# Patient Record
Sex: FEMALE | Race: WHITE | NOT HISPANIC OR LATINO | Employment: FULL TIME | URBAN - METROPOLITAN AREA
[De-identification: names, ages, dates, MRNs, and addresses within clinical notes are randomized per-mention and may not be internally consistent; named-entity substitution may affect disease eponyms.]

---

## 2017-01-03 ENCOUNTER — ANTICOAGULATION THERAPY VISIT (OUTPATIENT)
Dept: ANTICOAGULATION | Facility: CLINIC | Age: 47
End: 2017-01-03
Payer: COMMERCIAL

## 2017-01-03 DIAGNOSIS — Z79.01 LONG-TERM (CURRENT) USE OF ANTICOAGULANTS: Primary | ICD-10-CM

## 2017-01-03 DIAGNOSIS — D68.9 COAGULOPATHY (H): ICD-10-CM

## 2017-01-03 LAB — INR POINT OF CARE: 3.3 (ref 0.86–1.14)

## 2017-01-03 PROCEDURE — 99207 ZZC NO CHARGE NURSE ONLY: CPT

## 2017-01-03 PROCEDURE — 85610 PROTHROMBIN TIME: CPT | Mod: QW

## 2017-01-03 PROCEDURE — 36416 COLLJ CAPILLARY BLOOD SPEC: CPT

## 2017-01-03 NOTE — PROGRESS NOTES
ANTICOAGULATION FOLLOW-UP CLINIC VISIT    Patient Name:  Renetta Reid  Date:  1/3/2017  Contact Type:  Face to Face    SUBJECTIVE:     Patient Findings     Positives Unexplained INR or factor level change           OBJECTIVE    INR PROTIME   Date Value Ref Range Status   01/03/2017 3.3* 0.86 - 1.14 Final     CHROMOGENIC FACTOR 10   Date Value Ref Range Status   08/05/2010 60 60 - 140 % Final     Comment:     Therapeutic Range: A Chromogenic Factor 10 level of 20-40% inversely   correlates   with an INR of 2-3 for patients receiving Warfarin. Chromogenic Factor 10   levels below 20% indicate an INR greater than 3, and levels above 40%   indicate   an INR lessthan 2.       ASSESSMENT / PLAN  INR assessment SUPRA    Recheck INR In: 4 WEEKS    INR Location Clinic      Anticoagulation Summary as of 1/3/2017     INR goal 2.0-3.0   Selected INR 3.3! (1/3/2017)   Maintenance plan 1.25 mg (2.5 mg x 0.5) on Wed, Fri; 2.5 mg (2.5 mg x 1) all other days   Full instructions 1.25 mg on Wed, Fri; 2.5 mg all other days   Weekly total 15 mg   No change documented Elaine Lu, RN   Plan last modified Elaine Lu, RN (7/26/2016)   Next INR check 1/31/2017   Priority INR   Target end date     Indications   Long-term (current) use of anticoagulants [Z79.01] [Z79.01]  Coagulopathy (H) [D68.9]         Anticoagulation Episode Summary     INR check location     Preferred lab     Send INR reminders to [facility]    Comments       Anticoagulation Care Providers     Provider Role Specialty Phone number    Hortencia Marcus MD [facility] Internal Medicine [phone]            See the Encounter Report to view Anticoagulation Flowsheet and Dosing Calendar (Go to Encounters tab in chart review, and find the Anticoagulation Therapy Visit)    Dosage adjustment made based on physician directed care plan.    Elaine Lu, RN

## 2017-01-13 DIAGNOSIS — D68.9 COAGULOPATHY (H): ICD-10-CM

## 2017-01-13 DIAGNOSIS — L50.9 URTICARIA: Primary | ICD-10-CM

## 2017-01-13 RX ORDER — WARFARIN SODIUM 2.5 MG/1
TABLET ORAL
Qty: 90 TABLET | Refills: 0 | Status: SHIPPED | OUTPATIENT
Start: 2017-01-13 | End: 2017-04-17

## 2017-01-13 RX ORDER — HYDROXYZINE HYDROCHLORIDE 10 MG/1
10 TABLET, FILM COATED ORAL AT BEDTIME
Qty: 90 TABLET | Refills: 0 | Status: SHIPPED | OUTPATIENT
Start: 2017-01-13 | End: 2017-02-07

## 2017-01-13 NOTE — TELEPHONE ENCOUNTER
Warfarin Pt has upcoming appt, will fill x 1.     Last Written Prescription Date: 7/19/16  Last Fill Qty: 90, # refills: 1    Last Office Visit with G, P or [facility] prescribing provider: 1/4/16     Next 5 appointments (look out 90 days)     Jan 23, 2017  8:20 AM   PHYSICAL with Hortencia Marcus MD   [facility]    [address]  [facility] [address]   [phone]                 Date and Result of Last PT/INR:   INR      3.3   1/3/2017  INR      2.4   11/22/2016  INR      2.4   2/8/2012  INR      2.6   12/15/2010           Lab Results   Component Value Date    INR 3.3* 01/03/2017    INR 2.4* 11/22/2016    INR 2.1* 10/12/2016    INR 2.3* 09/14/2016    INR 2.2* 08/31/2016

## 2017-01-13 NOTE — TELEPHONE ENCOUNTER
Hydroxyzine      Last Written Prescription Date: 12/17/16  Last Fill Quantity: 90,  # refills: 3   Last Office Visit with G, P or [facility] prescribing provider: 01/04/16                                         Next 5 appointments (look out 90 days)     Jan 23, 2017  8:20 AM   PHYSICAL with Hortencia Marcus MD   [facility] ([facility])    [address]  [facility] [id number] [phone]

## 2017-01-31 ENCOUNTER — ANTICOAGULATION THERAPY VISIT (OUTPATIENT)
Dept: ANTICOAGULATION | Facility: CLINIC | Age: 47
End: 2017-01-31
Payer: COMMERCIAL

## 2017-01-31 DIAGNOSIS — Z79.01 LONG-TERM (CURRENT) USE OF ANTICOAGULANTS: Primary | ICD-10-CM

## 2017-01-31 DIAGNOSIS — D68.9 COAGULOPATHY (H): ICD-10-CM

## 2017-01-31 LAB — INR POINT OF CARE: 1.6 (ref 0.86–1.14)

## 2017-01-31 PROCEDURE — 85610 PROTHROMBIN TIME: CPT | Mod: QW

## 2017-01-31 PROCEDURE — 99207 ZZC NO CHARGE NURSE ONLY: CPT

## 2017-01-31 PROCEDURE — 36416 COLLJ CAPILLARY BLOOD SPEC: CPT

## 2017-01-31 NOTE — PROGRESS NOTES
ANTICOAGULATION FOLLOW-UP CLINIC VISIT    Patient Name:  Renetta Reid  Date:  1/31/2017  Contact Type:  Face to Face    SUBJECTIVE:     Patient Findings     Positives Missed doses (denies), Unexplained INR or factor level change           OBJECTIVE    INR PROTIME   Date Value Ref Range Status   01/31/2017 1.6* 0.86 - 1.14 Final     CHROMOGENIC FACTOR 10   Date Value Ref Range Status   08/05/2010 60 60 - 140 % Final     Comment:     Therapeutic Range: A Chromogenic Factor 10 level of 20-40% inversely   correlates   with an INR of 2-3 for patients receiving Warfarin. Chromogenic Factor 10   levels below 20% indicate an INR greater than 3, and levels above 40%   indicate   an INR lessthan 2.       ASSESSMENT / PLAN  INR assessment SUB    Recheck INR In: 2 WEEKS    INR Location Clinic      Anticoagulation Summary as of 1/31/2017     INR goal 2.0-3.0   Selected INR 1.6! (1/31/2017)   Maintenance plan 1.25 mg (2.5 mg x 0.5) on Wed, Fri; 2.5 mg (2.5 mg x 1) all other days   Full instructions 1/31: 5 mg; 2/1: 2.5 mg; Otherwise 1.25 mg on Wed, Fri; 2.5 mg all other days   Weekly total 15 mg   Plan last modified Elaine Lu RN (7/26/2016)   Next INR check 2/14/2017   Priority INR   Target end date     Indications   Long-term (current) use of anticoagulants [Z79.01] [Z79.01]  Coagulopathy (H) [D68.9]         Anticoagulation Episode Summary     INR check location     Preferred lab     Send INR reminders to [facility]    Comments       Anticoagulation Care Providers     Provider Role Specialty Phone number    Hortencia Marcus MD [facility] Internal Medicine [phone]            See the Encounter Report to view Anticoagulation Flowsheet and Dosing Calendar (Go to Encounters tab in chart review, and find the Anticoagulation Therapy Visit)    Dosage adjustment made based on physician directed care plan.    Elaine Lu, RN

## 2017-01-31 NOTE — MR AVS SNAPSHOT
Renetta Reid   1/31/2017 2:45 PM   Anticoagulation Therapy Visit    Description:  46 year old female   Provider:  [facility]   Department:  [facility]           INR as of 1/31/2017     Selected INR 1.6! (1/31/2017)      Anticoagulation Summary as of 1/31/2017     INR goal 2.0-3.0   Selected INR 1.6! (1/31/2017)   Full instructions 1/31: 5 mg; 2/1: 2.5 mg; Otherwise 1.25 mg on Wed, Fri; 2.5 mg all other days   Next INR check 2/14/2017    Indications   Long-term (current) use of anticoagulants [Z79.01] [Z79.01]  Coagulopathy (H) [D68.9]         Your next Anticoagulation Clinic appointment(s)     Feb 14, 2017 11:45 AM   Anticoagulation Visit with [facility]   [facility]    [address] [facility] [address]  [facility] [id number] [phone]              Contact Numbers     [facility] Phone Numbers:  Anticoagulation Clinic Appointments : [phone]  Anticoagulation Nurse: [phone]         January 2017 Details    Sun Mon Tue Wed Thu Fri Sat     1               2               3               4               5               6               7                 8               9               10               11               12               13               14                 15               16               17               18               19               20               21                 22               23               24               25               26               27               28                 29               30               31      5 mg   See details           Date Details   01/31 This INR check               How to take your warfarin dose     To take:  5 mg Take 2 of the 2.5 mg tablets.           February 2017 Details    Sun Mon Tue Wed Thu Fri Sat        1      2.5 mg         2      2.5 mg         3      1.25 mg         4      2.5 mg           5      2.5 mg         6      2.5 mg         7      2.5 mg          8      1.25 mg         9      2.5 mg         10      1.25 mg         11      2.5 mg           12      2.5 mg         13      2.5 mg         14            15               16               17               18                 19               20               21               22               23               24               25                 26               27               28                    Date Details   No additional details    Date of next INR:  2/14/2017         How to take your warfarin dose     To take:  1.25 mg Take 0.5 of a 2.5 mg tablet.    To take:  2.5 mg Take 1 of the 2.5 mg tablets.

## 2017-02-07 ENCOUNTER — RADIANT APPOINTMENT (OUTPATIENT)
Dept: GENERAL RADIOLOGY | Facility: CLINIC | Age: 47
End: 2017-02-07
Attending: INTERNAL MEDICINE
Payer: COMMERCIAL

## 2017-02-07 ENCOUNTER — OFFICE VISIT (OUTPATIENT)
Dept: INTERNAL MEDICINE | Facility: CLINIC | Age: 47
End: 2017-02-07
Payer: COMMERCIAL

## 2017-02-07 VITALS
HEIGHT: 62 IN | BODY MASS INDEX: 45.78 KG/M2 | SYSTOLIC BLOOD PRESSURE: 110 MMHG | WEIGHT: 248.8 LBS | HEART RATE: 118 BPM | TEMPERATURE: 97.8 F | DIASTOLIC BLOOD PRESSURE: 82 MMHG | OXYGEN SATURATION: 99 %

## 2017-02-07 DIAGNOSIS — Z00.00 ENCOUNTER FOR ROUTINE ADULT HEALTH EXAMINATION WITHOUT ABNORMAL FINDINGS: Primary | ICD-10-CM

## 2017-02-07 DIAGNOSIS — L50.9 URTICARIA: ICD-10-CM

## 2017-02-07 DIAGNOSIS — S69.92XA HAND INJURY, LEFT, INITIAL ENCOUNTER: ICD-10-CM

## 2017-02-07 DIAGNOSIS — K21.9 GASTROESOPHAGEAL REFLUX DISEASE WITHOUT ESOPHAGITIS: ICD-10-CM

## 2017-02-07 LAB
ANION GAP SERPL CALCULATED.3IONS-SCNC: 7 MMOL/L (ref 3–14)
BUN SERPL-MCNC: 12 MG/DL (ref 7–30)
CALCIUM SERPL-MCNC: 8.6 MG/DL (ref 8.5–10.1)
CHLORIDE SERPL-SCNC: 106 MMOL/L (ref 94–109)
CHOLEST SERPL-MCNC: 151 MG/DL
CO2 SERPL-SCNC: 25 MMOL/L (ref 20–32)
CREAT SERPL-MCNC: 0.66 MG/DL (ref 0.52–1.04)
GFR SERPL CREATININE-BSD FRML MDRD: NORMAL ML/MIN/1.7M2
GLUCOSE SERPL-MCNC: 90 MG/DL (ref 70–99)
HDLC SERPL-MCNC: 64 MG/DL
LDLC SERPL CALC-MCNC: 70 MG/DL
NONHDLC SERPL-MCNC: 87 MG/DL
POTASSIUM SERPL-SCNC: 4.2 MMOL/L (ref 3.4–5.3)
SODIUM SERPL-SCNC: 138 MMOL/L (ref 133–144)
TRIGL SERPL-MCNC: 84 MG/DL

## 2017-02-07 PROCEDURE — 80048 BASIC METABOLIC PNL TOTAL CA: CPT | Performed by: INTERNAL MEDICINE

## 2017-02-07 PROCEDURE — 80061 LIPID PANEL: CPT | Performed by: INTERNAL MEDICINE

## 2017-02-07 PROCEDURE — 99396 PREV VISIT EST AGE 40-64: CPT | Performed by: INTERNAL MEDICINE

## 2017-02-07 PROCEDURE — 87624 HPV HI-RISK TYP POOLED RSLT: CPT | Performed by: INTERNAL MEDICINE

## 2017-02-07 PROCEDURE — 73130 X-RAY EXAM OF HAND: CPT | Mod: LT

## 2017-02-07 PROCEDURE — 36415 COLL VENOUS BLD VENIPUNCTURE: CPT | Performed by: INTERNAL MEDICINE

## 2017-02-07 PROCEDURE — G0123 SCREEN CERV/VAG THIN LAYER: HCPCS | Performed by: INTERNAL MEDICINE

## 2017-02-07 RX ORDER — HYDROXYZINE HYDROCHLORIDE 10 MG/1
10 TABLET, FILM COATED ORAL AT BEDTIME
Qty: 90 TABLET | Refills: 3 | Status: SHIPPED | OUTPATIENT
Start: 2017-02-07 | End: 2018-04-21

## 2017-02-07 NOTE — MR AVS SNAPSHOT
After Visit Summary   2/7/2017    Renetta Reid    MRN: [id number]           Patient Information     Date Of Birth          1970        Visit Information        Provider Department      2/7/2017 9:00 AM Hortencia Marcus MD [facility]        Today's Diagnoses     Encounter for routine adult health examination without abnormal findings    -  1     Gastroesophageal reflux disease without esophagitis         Urticaria         Hand injury, left, initial encounter           Care Instructions      PREVENTIVE HEALTH RECOMMENDATIONS:     Vaccines: Get a flu shot each year. Get a tetanus shot every 10 years.     Exercise for at least 150 minutes a week (an average of 30 minutes a day, 5 days of the week). This will help you control your weight and prevent disease.    Limit alcohol to one drink per day.    No smoking.     Wear sunscreen to prevent skin cancer.     See your dentist twice a year for an exam and cleaning.    Try to get Calcium 1000 mg total per day. It is best to not take it all at once. Try to get Vitamin D at least 800-1000 units per day.    BMI or Body Mass Index is a way of indicating weight and health risk for cardiovascular diseases, high blood pressure, diabetes.   Definitions:    Underweight is less than 18.5 and will be associated with health risk.   Normal BMI is 18.5 to 25   Overweight is 25-29   Obesity is 30 or greater   Morbid Obesity is 40 or greater or 35 or greater with diabetes or high blood pressure  Obesity and Morbid Obesity are associated with higher health risks. Lowering calories, exercising more may lower your BMI and even small decreases can have positive impact on lowering health risks.   Your Body mass index is 45.49 kg/(m^2)..           Follow-ups after your visit        Your next 10 appointments already scheduled     Feb 14, 2017 11:45 AM   Anticoagulation Visit with [facility]   [facility] ([facility]  "   [address]  [facility] [id number] [phone]              Future tests that were ordered for you today     Open Future Orders        Priority Expected Expires Ordered    XR Hand Left G/E 3 Views Routine 2/7/2017 2/7/2018 2/7/2017            Who to contact     If you have questions or need follow up information about today's clinic visit or your schedule please contact [facility] directly at [phone].  Normal or non-critical lab and imaging results will be communicated to you by Root4hart, letter or phone within 4 business days after the clinic has received the results. If you do not hear from us within 7 days, please contact the clinic through OpinewsTVt or phone. If you have a critical or abnormal lab result, we will notify you by phone as soon as possible.  Submit refill requests through Qriously or call your pharmacy and they will forward the refill request to us. Please allow 3 business days for your refill to be completed.          Additional Information About Your Visit        Qriously Information     Qriously gives you secure access to your electronic health record. If you see a primary care provider, you can also send messages to your care team and make appointments. If you have questions, please call your primary care clinic.  If you do not have a primary care provider, please call [phone] and they will assist you.        Care EveryWhere ID     This is your Care EveryWhere ID. This could be used by other organizations to access your [place] medical records  [id number]        Your Vitals Were     Pulse Temperature Height BMI (Body Mass Index) Pulse Oximetry Last Period    118 97.8  F (36.6  C) (Oral) 5' 2\" (1.575 m) 45.49 kg/m2 99% 01/10/2017       Blood Pressure from Last 3 Encounters:   02/07/17 110/82   01/04/16 110/84   12/17/15 120/90    Weight from Last 3 Encounters:   02/07/17 248 lb 12.8 oz (112.855 kg)   01/04/16 266 lb (120.657 kg)   12/17/15 265 lb " (120.203 kg)              We Performed the Following     Basic metabolic panel     Lipid Profile with reflex to direct LDL          Where to get your medicines      These medications were sent to [place] [address] [place] [facility] [place] [place] - [address] [facility]  [address] [facility] [address]    Hours:  Tech issues with their phone system Phone:  [phone]    - hydrOXYzine 10 MG tablet  - omeprazole 20 MG CR capsule       Primary Care Provider Office Phone # Fax #    Hortencia Marcus [phone]       [facility] [address] [facility] [address]  [facility] [address]        Thank you!     Thank you for choosing [facility]  for your care. Our goal is always to provide you with excellent care. Hearing back from our patients is one way we can continue to improve our services. Please take a few minutes to complete the written survey that you may receive in the mail after your visit with us. Thank you!             Your Updated Medication List - Protect others around you: Learn how to safely use, store and throw away your medicines at www.disposemymeds.org.          This list is accurate as of: 2/7/17  9:29 AM.  Always use your most recent med list.                   Brand Name Dispense Instructions for use    hydrOXYzine 10 MG tablet    ATARAX    90 tablet    Take 1 tablet (10 mg) by mouth At Bedtime       omeprazole 20 MG CR capsule    priLOSEC    90 capsule    Take 1 capsule (20 mg) by mouth daily       warfarin 2.5 MG tablet    COUMADIN    90 tablet    Take 1 tablet (2.5 mg)  daily except take 1/2 tablet (1.25 mg) on Wednesday and Friday, or as instructed based on INR result.

## 2017-02-07 NOTE — PROGRESS NOTES
SUBJECTIVE:     CC: Renetta Reid is an 46 year old woman who presents for preventive health visit.     Healthy Habits:    Do you get at least three servings of calcium containing foods daily (dairy, green leafy vegetables, etc.)? no, taking calcium and/or vitamin D supplement: no    Amount of exercise or daily activities, outside of work: no    Problems taking medications regularly No    Medication side effects: No    Have you had an eye exam in the past two years? yes    Do you see a dentist twice per year? yes    Do you have sleep apnea, excessive snoring or daytime drowsiness?no        Current concerns:   She fell 1.5 weeks ago hitting left knee and left hand. The hand is still very painful at 5th metatarsal at wrist.     Today's PHQ-2 Score:   PHQ-2 ( 1999 [employer]) 2/7/2017 1/4/2016   Q1: Little interest or pleasure in doing things 0 0   Q2: Feeling down, depressed or hopeless 0 0   PHQ-2 Score 0 0       Abuse: Current or Past(Physical, Sexual or Emotional)- No  Do you feel safe in your environment - Yes    Social History   Substance Use Topics     Smoking status: Never Smoker      Smokeless tobacco: Never Used     Alcohol Use: Yes      Comment: socially     The patient does not drink >3 drinks per day nor >7 drinks per week.    Recent Labs   Lab Test  11/07/14   1011  05/29/12   0935   CHOL  141  149   HDL  52  47*   LDL  69  61   TRIG  98  203*   CHOLHDLRATIO  2.7  3.2       Reviewed orders with patient.  Reviewed health maintenance and updated orders accordingly - Yes    Mammo Decision Support:  Mammogram not appropriate for this patient based on age.    Pertinent mammograms are reviewed under the imaging tab.  History of abnormal Pap smear: NO - age 30- 65 PAP every 3 years recommended    Patient Active Problem List   Diagnosis     Calculus of kidney     CARDIOVASCULAR SCREENING; LDL GOAL LESS THAN 160     Coagulopathy (H)     HSP (Henoch Schonlein purpura) (H)     Elevated blood pressure reading without  "diagnosis of hypertension     Gastroesophageal reflux disease without esophagitis     Long-term (current) use of anticoagulants [Z79.01]     Current Outpatient Prescriptions   Medication Sig Dispense Refill     hydrOXYzine (ATARAX) 10 MG tablet Take 1 tablet (10 mg) by mouth At Bedtime 90 tablet 0     warfarin (COUMADIN) 2.5 MG tablet Take 1 tablet (2.5 mg)  daily except take 1/2 tablet (1.25 mg) on Wednesday and Friday, or as instructed based on INR result. 90 tablet 0     omeprazole (PRILOSEC) 20 MG capsule Take 1 capsule (20 mg) by mouth daily 90 capsule 0      Review Of Systems  Skin: negative  Eyes: negative  Ears/Nose/Throat: negative  Respiratory: No dyspnea on exertion and No cough  Cardiovascular: negative  Gastrointestinal: negative  Genitourinary: leakage  Musculoskeletal: as above  Neurologic: negative  Psychiatric: negative  Hematologic/Lymphatic/Immunologic: negative  Endocrine: negative   Gynecologic ros reveals:normal menses, no abnormal bleeding, pelvic pain or discharge, no breast pain or new or enlarging lumps on self exam    Objective:  Patient alert, in no acute distress  /82 mmHg  Pulse 118  Temp(Src) 97.8  F (36.6  C) (Oral)  Ht 5' 2\" (1.575 m)  Wt 248 lb 12.8 oz (112.855 kg)  BMI 45.49 kg/m2  SpO2 99%    HEENT: extraocular movements are intact, pupils equal and reactive to light and accommodation, TMs clear, oropharynx clear  NECK: Neck supple. No adenopathy. Thyroid symmetric, normal size,, Carotids without bruits.  PULMONARY: clear to auscultation  CARDIAC: regular rate and rhythm and no murmurs, clicks, or gallops  PULSES: 2/2 throughout  BACK: no spinal or CVAT  ABDOMINAL: Soft, nontender.  Normal bowel sounds.  No hepatosplenomegaly or abnormal masses  BREAST: No breast masses or tenderness, No axillary masses or tenderness and No galactorrhea  PELVIC: external genitalia normal, vaginal mucosa normal, cervix normal, specimen sent for pap, bimanual exam with normal uterus and " "adnexa, rectovaginal exam unremarkable  REFLEXES: 2+ throughout  SKIN: unremarkable  Left knee: prepatellar bursa and infra patellar busa with effusions. Mildly tender at patella and tibia.          ASSESSMENT/PLAN:     1. Encounter for routine adult health examination without abnormal findings    - Basic metabolic panel  - Lipid Profile with reflex to direct LDL    2. Gastroesophageal reflux disease without esophagitis  stable  - omeprazole (PRILOSEC) 20 MG CR capsule; Take 1 capsule (20 mg) by mouth daily  Dispense: 90 capsule; Refill: 3    3. Urticaria  stable  - hydrOXYzine (ATARAX) 10 MG tablet; Take 1 tablet (10 mg) by mouth At Bedtime  Dispense: 90 tablet; Refill: 3    4. Hand injury, left, initial encounter  May be fracture, check XR  - XR Hand Left G/E 3 Views; Future    COUNSELING:   Reviewed preventive health counseling, as reflected in patient instructions       Osteoporosis Prevention/Bone Health    BP Screening:   Last 3 BP Readings:    BP Readings from Last 3 Encounters:   02/07/17 110/82   01/04/16 110/84   12/17/15 120/90       The following was recommended to the patient:  Re-screen BP within a year and recommended lifestyle modifications       reports that she has never smoked. She has never used smokeless tobacco.    Estimated body mass index is 45.49 kg/(m^2) as calculated from the following:    Height as of this encounter: 5' 2\" (1.575 m).    Weight as of this encounter: 248 lb 12.8 oz (112.855 kg).   Weight management plan: Discussed healthy diet and exercise guidelines and patient will follow up in 12 months in clinic to re-evaluate.    Counseling Resources:  ATP IV Guidelines  Pooled Cohorts Equation Calculator  Breast Cancer Risk Calculator  FRAX Risk Assessment  ICSI Preventive Guidelines  Dietary Guidelines for Americans, 2010  USDA's MyPlate  ASA Prophylaxis  Lung CA Screening    Hortencia Marcus MD  [facility]  "

## 2017-02-07 NOTE — PATIENT INSTRUCTIONS
PREVENTIVE HEALTH RECOMMENDATIONS:     Vaccines: Get a flu shot each year. Get a tetanus shot every 10 years.     Exercise for at least 150 minutes a week (an average of 30 minutes a day, 5 days of the week). This will help you control your weight and prevent disease.    Limit alcohol to one drink per day.    No smoking.     Wear sunscreen to prevent skin cancer.     See your dentist twice a year for an exam and cleaning.    Try to get Calcium 1000 mg total per day. It is best to not take it all at once. Try to get Vitamin D at least 800-1000 units per day.    BMI or Body Mass Index is a way of indicating weight and health risk for cardiovascular diseases, high blood pressure, diabetes.   Definitions:    Underweight is less than 18.5 and will be associated with health risk.   Normal BMI is 18.5 to 25   Overweight is 25-29   Obesity is 30 or greater   Morbid Obesity is 40 or greater or 35 or greater with diabetes or high blood pressure  Obesity and Morbid Obesity are associated with higher health risks. Lowering calories, exercising more may lower your BMI and even small decreases can have positive impact on lowering health risks.   Your Body mass index is 45.49 kg/(m^2)..

## 2017-02-07 NOTE — NURSING NOTE
"Chief Complaint   Patient presents with     Physical     fasting for labs,       Initial /82 mmHg  Pulse 118  Temp(Src) 97.8  F (36.6  C) (Oral)  Ht 5' 2\" (1.575 m)  Wt 248 lb 12.8 oz (112.855 kg)  BMI 45.49 kg/m2  SpO2 99% Estimated body mass index is 45.49 kg/(m^2) as calculated from the following:    Height as of this encounter: 5' 2\" (1.575 m).    Weight as of this encounter: 248 lb 12.8 oz (112.855 kg).  Medication Reconciliation: complete    "

## 2017-02-10 LAB
COPATH REPORT: NORMAL
PAP: NORMAL

## 2017-02-13 LAB
FINAL DIAGNOSIS: NORMAL
HPV HR 12 DNA CVX QL NAA+PROBE: NEGATIVE
HPV16 DNA SPEC QL NAA+PROBE: NEGATIVE
HPV18 DNA SPEC QL NAA+PROBE: NEGATIVE
SPECIMEN DESCRIPTION: NORMAL

## 2017-02-14 ENCOUNTER — ANTICOAGULATION THERAPY VISIT (OUTPATIENT)
Dept: ANTICOAGULATION | Facility: CLINIC | Age: 47
End: 2017-02-14
Payer: COMMERCIAL

## 2017-02-14 DIAGNOSIS — D68.9 COAGULOPATHY (H): ICD-10-CM

## 2017-02-14 DIAGNOSIS — Z79.01 LONG-TERM (CURRENT) USE OF ANTICOAGULANTS: ICD-10-CM

## 2017-02-14 LAB — INR POINT OF CARE: NORMAL (ref 0.86–1.14)

## 2017-02-14 PROCEDURE — 36416 COLLJ CAPILLARY BLOOD SPEC: CPT

## 2017-02-14 PROCEDURE — 99207 ZZC NO CHARGE NURSE ONLY: CPT

## 2017-02-14 PROCEDURE — 85610 PROTHROMBIN TIME: CPT | Mod: QW

## 2017-02-14 NOTE — PROGRESS NOTES
ANTICOAGULATION FOLLOW-UP CLINIC VISIT    Patient Name:  Renetta Reid  Date:  2/14/2017  Contact Type:  Face to Face    SUBJECTIVE:     Patient Findings     Positives No Problem Findings           OBJECTIVE    INR Protime   Date Value Ref Range Status   02/14/2017 2.2. 0.86 - 1.14 Final     Chromogenic Factor 10   Date Value Ref Range Status   08/05/2010 60 60 - 140 % Final     Comment:     Therapeutic Range: A Chromogenic Factor 10 level of 20-40% inversely   correlates   with an INR of 2-3 for patients receiving Warfarin. Chromogenic Factor 10   levels below 20% indicate an INR greater than 3, and levels above 40%   indicate   an INR lessthan 2.       ASSESSMENT / PLAN  INR assessment THER    Recheck INR In: 4 WEEKS    INR Location Clinic      Anticoagulation Summary as of 2/14/2017     INR goal 2.0-3.0   Today's INR 2.2.   The selected INR is not numeric and cannot be flagged as in or out of the INR goal range.   Maintenance plan 1.25 mg (2.5 mg x 0.5) on Wed, Fri; 2.5 mg (2.5 mg x 1) all other days   Full instructions 1.25 mg on Wed, Fri; 2.5 mg all other days   Weekly total 15 mg   No change documented Elaine Lu RN   Plan last modified Elaine Lu RN (7/26/2016)   Next INR check 3/14/2017   Priority INR   Target end date     Indications   Long-term (current) use of anticoagulants [Z79.01] [Z79.01]  Coagulopathy (H) [D68.9]         Anticoagulation Episode Summary     INR check location     Preferred lab     Send INR reminders to [facility]    Comments       Anticoagulation Care Providers     Provider Role Specialty Phone number    Hortencia Marcus MD [facility] Internal Medicine [phone]            See the Encounter Report to view Anticoagulation Flowsheet and Dosing Calendar (Go to Encounters tab in chart review, and find the Anticoagulation Therapy Visit)        Elaine Lu, RN

## 2017-02-14 NOTE — MR AVS SNAPSHOT
Renetta NATANAEL Reid   2/14/2017 11:45 AM   Anticoagulation Therapy Visit    Description:  46 year old female   Provider:  [facility]   Department:  [facility]           INR as of 2/14/2017     Today's INR 2.2.    The selected INR is not numeric and cannot be flagged as in or out of the INR goal range.      Anticoagulation Summary as of 2/14/2017     INR goal 2.0-3.0   Today's INR 2.2.   The selected INR is not numeric and cannot be flagged as in or out of the INR goal range.   Full instructions 1.25 mg on Wed, Fri; 2.5 mg all other days   Next INR check 3/14/2017    Indications   Long-term (current) use of anticoagulants [Z79.01] [Z79.01]  Coagulopathy (H) [D68.9]         Your next Anticoagulation Clinic appointment(s)     Mar 14, 2017  1:30 PM CDT   [facility]agulation Visit with [facility]   [facility] ([facility])    [address] [facility] [address]  [facility] [address] [phone]              Contact Numbers     [facility] Phone Numbers:  Anticoagulation Clinic Appointments : [phone]  Anticoagulation Nurse: [phone]         February 2017 Details    Sun Mon Tue Wed Thu Fri Sat        1               2               3               4                 5               6               7               8               9               10               11                 12               13               14      2.5 mg   See details      15      1.25 mg         16      2.5 mg         17      1.25 mg         18      2.5 mg           19      2.5 mg         20      2.5 mg         21      2.5 mg         22      1.25 mg         23      2.5 mg         24      1.25 mg         25      2.5 mg           26      2.5 mg         27      2.5 mg         28      2.5 mg              Date Details   02/14 This INR check               How to take your warfarin dose     To take:  1.25 mg Take 0.5 of a 2.5 mg tablet.    To take:  2.5 mg Take 1 of the 2.5 mg  tablets.           March 2017 Details    Sun Mon Tue Wed Thu Fri Sat        1      1.25 mg         2      2.5 mg         3      1.25 mg         4      2.5 mg           5      2.5 mg         6      2.5 mg         7      2.5 mg         8      1.25 mg         9      2.5 mg         10      1.25 mg         11      2.5 mg           12      2.5 mg         13      2.5 mg         14            15               16               17               18                 19               20               21               22               23               24               25                 26               27               28               29               30               31                 Date Details   No additional details    Date of next INR:  3/14/2017         How to take your warfarin dose     To take:  1.25 mg Take 0.5 of a 2.5 mg tablet.    To take:  2.5 mg Take 1 of the 2.5 mg tablets.

## 2017-03-06 DIAGNOSIS — K21.9 GASTROESOPHAGEAL REFLUX DISEASE WITHOUT ESOPHAGITIS: ICD-10-CM

## 2017-03-22 ENCOUNTER — ANTICOAGULATION THERAPY VISIT (OUTPATIENT)
Dept: ANTICOAGULATION | Facility: CLINIC | Age: 47
End: 2017-03-22
Payer: COMMERCIAL

## 2017-03-22 DIAGNOSIS — D68.9 COAGULOPATHY (H): ICD-10-CM

## 2017-03-22 DIAGNOSIS — Z79.01 LONG-TERM (CURRENT) USE OF ANTICOAGULANTS: ICD-10-CM

## 2017-03-22 LAB — INR POINT OF CARE: 2.1 (ref 0.86–1.14)

## 2017-03-22 PROCEDURE — 36416 COLLJ CAPILLARY BLOOD SPEC: CPT

## 2017-03-22 PROCEDURE — 99207 ZZC NO CHARGE NURSE ONLY: CPT

## 2017-03-22 PROCEDURE — 85610 PROTHROMBIN TIME: CPT | Mod: QW

## 2017-03-22 NOTE — MR AVS SNAPSHOT
Renetta NATANAEL Redi   3/22/2017 3:15 PM   Anticoagulation Therapy Visit    Description:  46 year old female   Provider[facility]  [facility]   Department:  [facility]           INR as of 3/22/2017     Today's INR 2.1      Anticoagulation Summary as of 3/22/2017     INR goal 2.0-3.0   Today's INR 2.1   Full instructions 1.25 mg on Wed, Fri; 2.5 mg all other days   Next INR check 5/3/2017    Indications   Long-term (current) use of anticoagulants [Z79.01] [Z79.01]  Coagulopathy (H) [D68.9]         Your next Anticoagulation Clinic appointment(s)     May 03, 2017  3:15 PM CDT   [facility]    [address] [facility] [address]  [facility] [address] [phone]              Contact Numbers     [facility] Phone Numbers:  Anticoagulation Clinic Appointments : [phone]  Anticoagulation Nurse: [phone]         March 2017 Details    Sun Mon Tue Wed Thu Fri Sat        1               2               3               4                 5               6               7               8               9               10               11                 12               13               14               15               16               17               18                 19               20               21               22      1.25 mg   See details      23      2.5 mg         24      1.25 mg         25      2.5 mg           26      2.5 mg         27      2.5 mg         28      2.5 mg         29      1.25 mg         30      2.5 mg         31      1.25 mg           Date Details   03/22 This INR check               How to take your warfarin dose     To take:  1.25 mg Take 0.5 of a 2.5 mg tablet.    To take:  2.5 mg Take 1 of the 2.5 mg tablets.           April 2017 Details    Sun Mon Tue Wed Thu Fri Sat           1      2.5 mg           2      2.5 mg         3      2.5 mg         4      2.5 mg         5       1.25 mg         6      2.5 mg         7      1.25 mg         8      2.5 mg           9      2.5 mg         10      2.5 mg         11      2.5 mg         12      1.25 mg         13      2.5 mg         14      1.25 mg         15      2.5 mg           16      2.5 mg         17      2.5 mg         18      2.5 mg         19      1.25 mg         20      2.5 mg         21      1.25 mg         22      2.5 mg           23      2.5 mg         24      2.5 mg         25      2.5 mg         26      1.25 mg         27      2.5 mg         28      1.25 mg         29      2.5 mg           30      2.5 mg                Date Details   No additional details            How to take your warfarin dose     To take:  1.25 mg Take 0.5 of a 2.5 mg tablet.    To take:  2.5 mg Take 1 of the 2.5 mg tablets.           May 2017 Details    Sun Mon Tue Wed Thu Fri Sat      1      2.5 mg         2      2.5 mg         3            4               5               6                 7               8               9               10               11               12               13                 14               15               16               17               18               19               20                 21               22               23               24               25               26               27                 28               29               30               31                   Date Details   No additional details    Date of next INR:  5/3/2017         How to take your warfarin dose     To take:  1.25 mg Take 0.5 of a 2.5 mg tablet.    To take:  2.5 mg Take 1 of the 2.5 mg tablets.

## 2017-03-22 NOTE — PROGRESS NOTES
ANTICOAGULATION FOLLOW-UP CLINIC VISIT    Patient Name:  Renetta Reid  Date:  3/22/2017  Contact Type:  Face to Face    SUBJECTIVE:     Patient Findings     Positives No Problem Findings           OBJECTIVE    INR Protime   Date Value Ref Range Status   03/22/2017 2.1 (A) 0.86 - 1.14 Final     Chromogenic Factor 10   Date Value Ref Range Status   08/05/2010 60 60 - 140 % Final     Comment:     Therapeutic Range: A Chromogenic Factor 10 level of 20-40% inversely   correlates   with an INR of 2-3 for patients receiving Warfarin. Chromogenic Factor 10   levels below 20% indicate an INR greater than 3, and levels above 40%   indicate   an INR lessthan 2.       ASSESSMENT / PLAN  INR assessment THER    Recheck INR In: 6 WEEKS    INR Location Clinic      Anticoagulation Summary as of 3/22/2017     INR goal 2.0-3.0   Today's INR 2.1   Maintenance plan 1.25 mg (2.5 mg x 0.5) on Wed, Fri; 2.5 mg (2.5 mg x 1) all other days   Full instructions 1.25 mg on Wed, Fri; 2.5 mg all other days   Weekly total 15 mg   No change documented Jennifer Matias RN   Plan last modified Elaine Lu RN (7/26/2016)   Next INR check 5/3/2017   Priority INR   Target end date     Indications   Long-term (current) use of anticoagulants [Z79.01] [Z79.01]  Coagulopathy (H) [D68.9]         Anticoagulation Episode Summary     INR check location     Preferred lab     Send INR reminders to [facility]    Comments       Anticoagulation Care Providers     Provider Role Specialty Phone number    Hortencia Marcus MD [facility] Internal Medicine [phone]            See the Encounter Report to view Anticoagulation Flowsheet and Dosing Calendar (Go to Encounters tab in chart review, and find the Anticoagulation Therapy Visit)    Dosage adjustment made based on physician directed care plan.    Jennifer Matias RN

## 2017-04-14 DIAGNOSIS — L50.9 URTICARIA: ICD-10-CM

## 2017-04-14 RX ORDER — HYDROXYZINE HYDROCHLORIDE 10 MG/1
TABLET, FILM COATED ORAL
Qty: 90 TABLET | Refills: 0 | OUTPATIENT
Start: 2017-04-14

## 2017-04-17 DIAGNOSIS — D68.9 COAGULOPATHY (H): ICD-10-CM

## 2017-04-18 RX ORDER — WARFARIN SODIUM 2.5 MG/1
TABLET ORAL
Qty: 90 TABLET | Refills: 1 | Status: SHIPPED | OUTPATIENT
Start: 2017-04-18 | End: 2017-10-19

## 2017-04-18 NOTE — TELEPHONE ENCOUNTER
Warfarin 2.5 mg    Last Written Prescription Date: 1/13/17  Last Fill Qty: 90, # refills: 0  [place] Office Visit with [facility] or [facility] prescribing provider: 2/17/17    Date and Result of Last PT/INR:   Lab Results   Component Value Date    INR 2.1 03/22/2017    INR 2.2. 02/14/2017    INR 2.4 02/08/2012    INR 2.6 12/15/2010   Prescription approved per [facility] Refill Protocol.  Elaine Lu RN

## 2017-05-03 ENCOUNTER — ANTICOAGULATION THERAPY VISIT (OUTPATIENT)
Dept: ANTICOAGULATION | Facility: CLINIC | Age: 47
End: 2017-05-03
Payer: COMMERCIAL

## 2017-05-03 DIAGNOSIS — Z79.01 LONG-TERM (CURRENT) USE OF ANTICOAGULANTS: ICD-10-CM

## 2017-05-03 DIAGNOSIS — D68.9 COAGULOPATHY (H): ICD-10-CM

## 2017-05-03 LAB — INR POINT OF CARE: 2.2 (ref 0.86–1.14)

## 2017-05-03 PROCEDURE — 36416 COLLJ CAPILLARY BLOOD SPEC: CPT

## 2017-05-03 PROCEDURE — 85610 PROTHROMBIN TIME: CPT | Mod: QW

## 2017-05-03 PROCEDURE — 99207 ZZC NO CHARGE NURSE ONLY: CPT

## 2017-05-03 NOTE — MR AVS SNAPSHOT
Renetta NATANAEL Reid   5/3/2017 3:15 PM   Anticoagulation Therapy Visit    Description:  46 year old female   Provider[facility]   Department:  [facility]           INR as of 5/3/2017     Today's INR 2.2      Anticoagulation Summary as of 5/3/2017     INR goal 2.0-3.0   Today's INR 2.2   Full instructions 1.25 mg on Wed, Fri; 2.5 mg all other days   Next INR check 6/14/2017    Indications   Long-term (current) use of anticoagulants [Z79.01] [Z79.01]  Coagulopathy (H) [D68.9]         Your next Anticoagulation Clinic appointment(s)     Jun 14, 2017 11:15 AM CDT   [facility]ticoagulation Visit with [facility]   [facility] ([facility])    [address] [facility] [address]  [facility] [address] [phone]              Contact Numbers     [facility] Phone Numbers:  Anticoagulation Clinic Appointments : [phone]  Anticoagulation Nurse: [phone]         May 2017 Details    Sun Mon Tue Wed Thu Fri Sat      1               2               3      1.25 mg   See details      4      2.5 mg         5      1.25 mg         6      2.5 mg           7      2.5 mg         8      2.5 mg         9      2.5 mg         10      1.25 mg         11      2.5 mg         12      1.25 mg         13      2.5 mg           14      2.5 mg         15      2.5 mg         16      2.5 mg         17      1.25 mg         18      2.5 mg         19      1.25 mg         20      2.5 mg           21      2.5 mg         22      2.5 mg         23      2.5 mg         24      1.25 mg         25      2.5 mg         26      1.25 mg         27      2.5 mg           28      2.5 mg         29      2.5 mg         30      2.5 mg         31      1.25 mg             Date Details   05/03 This INR check               How to take your warfarin dose     To take:  1.25 mg Take 0.5 of a 2.5 mg tablet.    To take:  2.5 mg Take 1 of the 2.5 mg tablets.           June 2017 Details    Sun Mon Tue Wed Thu Fri  Sat         1      2.5 mg         2      1.25 mg         3      2.5 mg           4      2.5 mg         5      2.5 mg         6      2.5 mg         7      1.25 mg         8      2.5 mg         9      1.25 mg         10      2.5 mg           11      2.5 mg         12      2.5 mg         13      2.5 mg         14            15               16               17                 18               19               20               21               22               23               24                 25               26               27               28               29               30                 Date Details   No additional details    Date of next INR:  6/14/2017         How to take your warfarin dose     To take:  1.25 mg Take 0.5 of a 2.5 mg tablet.    To take:  2.5 mg Take 1 of the 2.5 mg tablets.

## 2017-05-03 NOTE — PROGRESS NOTES
ANTICOAGULATION FOLLOW-UP CLINIC VISIT    Patient Name:  Renetta Reid  Date:  5/3/2017  Contact Type:  Face to Face    SUBJECTIVE:     Patient Findings     Positives No Problem Findings           OBJECTIVE    INR Protime   Date Value Ref Range Status   05/03/2017 2.2 (A) 0.86 - 1.14 Final     Chromogenic Factor 10   Date Value Ref Range Status   08/05/2010 60 60 - 140 % Final     Comment:     Therapeutic Range: A Chromogenic Factor 10 level of 20-40% inversely   correlates   with an INR of 2-3 for patients receiving Warfarin. Chromogenic Factor 10   levels below 20% indicate an INR greater than 3, and levels above 40%   indicate   an INR lessthan 2.       ASSESSMENT / PLAN  INR assessment THER    Recheck INR In: 6 WEEKS    INR Location Clinic      Anticoagulation Summary as of 5/3/2017     INR goal 2.0-3.0   Today's INR 2.2   Maintenance plan 1.25 mg (2.5 mg x 0.5) on Wed, Fri; 2.5 mg (2.5 mg x 1) all other days   Full instructions 1.25 mg on Wed, Fri; 2.5 mg all other days   Weekly total 15 mg   No change documented Jennifer Matias RN   Plan last modified Elaine Lu RN (7/26/2016)   Next INR check 6/14/2017   Priority INR   Target end date     Indications   Long-term (current) use of anticoagulants [Z79.01] [Z79.01]  Coagulopathy (H) [D68.9]         Anticoagulation Episode Summary     INR check location     Preferred lab     Send INR reminders to [facility]    Comments       Anticoagulation Care Providers     Provider Role Specialty Phone number    Hortencia Marcus MD [facility] Internal Medicine [phone]            See the Encounter Report to view Anticoagulation Flowsheet and Dosing Calendar (Go to Encounters tab in chart review, and find the Anticoagulation Therapy Visit)    Dosage adjustment made based on physician directed care plan.    Jennifer Matias RN

## 2017-06-14 ENCOUNTER — ANTICOAGULATION THERAPY VISIT (OUTPATIENT)
Dept: ANTICOAGULATION | Facility: CLINIC | Age: 47
End: 2017-06-14
Payer: COMMERCIAL

## 2017-06-14 DIAGNOSIS — D68.9 COAGULOPATHY (H): ICD-10-CM

## 2017-06-14 DIAGNOSIS — Z79.01 LONG-TERM (CURRENT) USE OF ANTICOAGULANTS: ICD-10-CM

## 2017-06-14 LAB — INR POINT OF CARE: 2.4 (ref 0.86–1.14)

## 2017-06-14 PROCEDURE — 85610 PROTHROMBIN TIME: CPT | Mod: QW

## 2017-06-14 PROCEDURE — 36416 COLLJ CAPILLARY BLOOD SPEC: CPT

## 2017-06-14 PROCEDURE — 99207 ZZC NO CHARGE NURSE ONLY: CPT

## 2017-06-14 NOTE — MR AVS SNAPSHOT
Renetta NATANAEL Reid   6/14/2017 11:15 AM   Anticoagulation Therapy Visit    Description:  46 year old female   Provider[facility]  [facility]   Department:  [facility]           INR as of 6/14/2017     Today's INR 2.4      Anticoagulation Summary as of 6/14/2017     INR goal 2.0-3.0   Today's INR 2.4   Full instructions 1.25 mg on Wed, Fri; 2.5 mg all other days   Next INR check 7/19/2017    Indications   Long-term (current) use of anticoagulants [Z79.01] [Z79.01]  Coagulopathy (H) [D68.9]         Your next Anticoagulation Clinic appointment(s)     Jul 19, 2017 11:00 AM CDT   Anticoagulation Visit with [facility]   [facility]    [address] [facility] [address]  [facility] [address] [phone]              Contact Numbers     [facility] Phone Numbers:  Anticoagulation Clinic Appointments : [phone]  Anticoagulation Nurse: [phone]         June 2017 Details    Sun Mon Tue Wed Thu Fri Sat         1               2               3                 4               5               6               7               8               9               10                 11               12               13               14      1.25 mg   See details      15      2.5 mg         16      1.25 mg         17      2.5 mg           18      2.5 mg         19      2.5 mg         20      2.5 mg         21      1.25 mg         22      2.5 mg         23      1.25 mg         24      2.5 mg           25      2.5 mg         26      2.5 mg         27      2.5 mg         28      1.25 mg         29      2.5 mg         30      1.25 mg           Date Details   06/14 This INR check               How to take your warfarin dose     To take:  1.25 mg Take 0.5 of a 2.5 mg tablet.    To take:  2.5 mg Take 1 of the 2.5 mg tablets.           July 2017 Details    Sun Mon Tue Wed Thu Fri Sat           1      2.5 mg           2      2.5 mg         3      2.5 mg         4       2.5 mg         5      1.25 mg         6      2.5 mg         7      1.25 mg         8      2.5 mg           9      2.5 mg         10      2.5 mg         11      2.5 mg         12      1.25 mg         13      2.5 mg         14      1.25 mg         15      2.5 mg           16      2.5 mg         17      2.5 mg         18      2.5 mg         19            20               21               22                 23               24               25               26               27               28               29                 30               31                     Date Details   No additional details    Date of next INR:  7/19/2017         How to take your warfarin dose     To take:  1.25 mg Take 0.5 of a 2.5 mg tablet.    To take:  2.5 mg Take 1 of the 2.5 mg tablets.

## 2017-06-14 NOTE — PROGRESS NOTES
ANTICOAGULATION FOLLOW-UP CLINIC VISIT    Patient Name:  Renetta Reid  Date:  6/14/2017  Contact Type:  Face to Face    SUBJECTIVE:     Patient Findings     Positives No Problem Findings           OBJECTIVE    INR Protime   Date Value Ref Range Status   06/14/2017 2.4 (A) 0.86 - 1.14 Final     Chromogenic Factor 10   Date Value Ref Range Status   08/05/2010 60 60 - 140 % Final     Comment:     Therapeutic Range: A Chromogenic Factor 10 level of 20-40% inversely   correlates   with an INR of 2-3 for patients receiving Warfarin. Chromogenic Factor 10   levels below 20% indicate an INR greater than 3, and levels above 40%   indicate   an INR lessthan 2.       ASSESSMENT / PLAN  INR assessment THER    Recheck INR In: 6 WEEKS    INR Location Clinic      Anticoagulation Summary as of 6/14/2017     INR goal 2.0-3.0   Today's INR 2.4   Maintenance plan 1.25 mg (2.5 mg x 0.5) on Wed, Fri; 2.5 mg (2.5 mg x 1) all other days   Full instructions 1.25 mg on Wed, Fri; 2.5 mg all other days   Weekly total 15 mg   No change documented Jennifer Matias RN   Plan last modified Elaine Lu RN (7/26/2016)   Next INR check 7/19/2017   Priority INR   Target end date     Indications   Long-term (current) use of anticoagulants [Z79.01] [Z79.01]  Coagulopathy (H) [D68.9]         Anticoagulation Episode Summary     INR check location     Preferred lab     Send INR reminders to [facility]    Comments       Anticoagulation Care Providers     Provider Role Specialty Phone number    Hortencia Marcus MD [facility] Internal Medicine [phone]            See the Encounter Report to view Anticoagulation Flowsheet and Dosing Calendar (Go to Encounters tab in chart review, and find the Anticoagulation Therapy Visit)    Dosage adjustment made based on physician directed care plan.    Jennifer Matias RN

## 2017-07-25 ENCOUNTER — TELEPHONE (OUTPATIENT)
Dept: ANTICOAGULATION | Facility: CLINIC | Age: 47
End: 2017-07-25

## 2017-07-25 ENCOUNTER — ANTICOAGULATION THERAPY VISIT (OUTPATIENT)
Dept: ANTICOAGULATION | Facility: CLINIC | Age: 47
End: 2017-07-25
Payer: COMMERCIAL

## 2017-07-25 DIAGNOSIS — D68.9 COAGULOPATHY (H): Primary | ICD-10-CM

## 2017-07-25 DIAGNOSIS — Z79.01 LONG-TERM (CURRENT) USE OF ANTICOAGULANTS: ICD-10-CM

## 2017-07-25 DIAGNOSIS — D68.9 COAGULOPATHY (H): ICD-10-CM

## 2017-07-25 LAB — INR POINT OF CARE: 2.3 (ref 0.86–1.14)

## 2017-07-25 PROCEDURE — 99207 ZZC NO CHARGE NURSE ONLY: CPT

## 2017-07-25 PROCEDURE — 36416 COLLJ CAPILLARY BLOOD SPEC: CPT

## 2017-07-25 PROCEDURE — 85610 PROTHROMBIN TIME: CPT | Mod: QW

## 2017-07-25 NOTE — MR AVS SNAPSHOT
Renetta Reid   7/25/2017 11:15 AM   Anticoagulation Therapy Visit    Description:  46 year old female   Provider[facility]   Department:  [facility]           INR as of 7/25/2017     Today's INR 2.3      Anticoagulation Summary as of 7/25/2017     INR goal 2.0-3.0   Today's INR 2.3   Full instructions 1.25 mg on Wed, Fri; 2.5 mg all other days   Next INR check 9/6/2017    Indications   Long-term (current) use of anticoagulants [Z79.01] [Z79.01]  Coagulopathy (H) [D68.9]         Your next Anticoagulation Clinic appointment(s)     Sep 06, 2017  3:45 PM CDT   [facility] with [facility]   [facility]    [address] [facility] [address]  [facility] [address] [phone]              Contact Numbers     [facility] Phone Numbers:  Anticoagulation Clinic Appointments : [phone]  Anticoagulation Nurse: [phone]         July 2017 Details    Sun Mon Tue Wed Thu Fri Sat           1                 2               3               4               5               6               7               8                 9               10               11               12               13               14               15                 16               17               18               19               20               21               22                 23               24               25      2.5 mg   See details      26      1.25 mg         27      2.5 mg         28      1.25 mg         29      2.5 mg           30      2.5 mg         31      2.5 mg               Date Details   07/25 This INR check               How to take your warfarin dose     To take:  1.25 mg Take 0.5 of a 2.5 mg tablet.    To take:  2.5 mg Take 1 of the 2.5 mg tablets.           August 2017 Details    Sun Mon Tue Wed Thu Fri Sat       1      2.5 mg         2      1.25 mg         3      2.5 mg         4      1.25 mg         5      2.5 mg            6      2.5 mg         7      2.5 mg         8      2.5 mg         9      1.25 mg         10      2.5 mg         11      1.25 mg         12      2.5 mg           13      2.5 mg         14      2.5 mg         15      2.5 mg         16      1.25 mg         17      2.5 mg         18      1.25 mg         19      2.5 mg           20      2.5 mg         21      2.5 mg         22      2.5 mg         23      1.25 mg         24      2.5 mg         25      1.25 mg         26      2.5 mg           27      2.5 mg         28      2.5 mg         29      2.5 mg         30      1.25 mg         31      2.5 mg            Date Details   No additional details            How to take your warfarin dose     To take:  1.25 mg Take 0.5 of a 2.5 mg tablet.    To take:  2.5 mg Take 1 of the 2.5 mg tablets.           September 2017 Details    Sun Mon Tue Wed Thu Fri Sat          1      1.25 mg         2      2.5 mg           3      2.5 mg         4      2.5 mg         5      2.5 mg         6            7               8               9                 10               11               12               13               14               15               16                 17               18               19               20               21               22               23                 24               25               26               27               28               29               30                Date Details   No additional details    Date of next INR:  9/6/2017         How to take your warfarin dose     To take:  1.25 mg Take 0.5 of a 2.5 mg tablet.    To take:  2.5 mg Take 1 of the 2.5 mg tablets.

## 2017-07-25 NOTE — TELEPHONE ENCOUNTER
For insurance purposes, an annual INR referral is needed.  Please sign order and route message back to [facility].

## 2017-07-25 NOTE — PROGRESS NOTES
ANTICOAGULATION FOLLOW-UP CLINIC VISIT    Patient Name:  Renetta Reid  Date:  7/25/2017  Contact Type:  Face to Face    SUBJECTIVE:     Patient Findings     Positives No Problem Findings           OBJECTIVE    INR Protime   Date Value Ref Range Status   07/25/2017 2.3 (A) 0.86 - 1.14 Final     Chromogenic Factor 10   Date Value Ref Range Status   08/05/2010 60 60 - 140 % Final     Comment:     Therapeutic Range: A Chromogenic Factor 10 level of 20-40% inversely   correlates   with an INR of 2-3 for patients receiving Warfarin. Chromogenic Factor 10   levels below 20% indicate an INR greater than 3, and levels above 40%   indicate   an INR lessthan 2.       ASSESSMENT / PLAN  INR assessment THER    Recheck INR In: 6 WEEKS    INR Location Clinic      Anticoagulation Summary as of 7/25/2017     INR goal 2.0-3.0   Today's INR 2.3   Maintenance plan 1.25 mg (2.5 mg x 0.5) on Wed, Fri; 2.5 mg (2.5 mg x 1) all other days   Full instructions 1.25 mg on Wed, Fri; 2.5 mg all other days   Weekly total 15 mg   No change documented Sharda uGo, RN   Plan last modified Elaine Lu RN (7/26/2016)   Next INR check 9/6/2017   Priority INR   Target end date     Indications   Long-term (current) use of anticoagulants [Z79.01] [Z79.01]  Coagulopathy (H) [D68.9]         Anticoagulation Episode Summary     INR check location     Preferred lab     Send INR reminders to [facility]    Comments       Anticoagulation Care Providers     Provider Role Specialty Phone number    Hortencia Marcus MD [facility] Internal Medicine [phone]            See the Encounter Report to view Anticoagulation Flowsheet and Dosing Calendar (Go to Encounters tab in chart review, and find the Anticoagulation Therapy Visit)    Dosage adjustment made based on physician directed care plan.    Sharda Guo RN

## 2017-09-07 ENCOUNTER — ANTICOAGULATION THERAPY VISIT (OUTPATIENT)
Dept: ANTICOAGULATION | Facility: CLINIC | Age: 47
End: 2017-09-07
Payer: COMMERCIAL

## 2017-09-07 DIAGNOSIS — Z79.01 LONG-TERM (CURRENT) USE OF ANTICOAGULANTS: ICD-10-CM

## 2017-09-07 DIAGNOSIS — D68.9 COAGULOPATHY (H): ICD-10-CM

## 2017-09-07 LAB — INR POINT OF CARE: 2.6 (ref 0.86–1.14)

## 2017-09-07 PROCEDURE — 85610 PROTHROMBIN TIME: CPT | Mod: QW

## 2017-09-07 PROCEDURE — 36416 COLLJ CAPILLARY BLOOD SPEC: CPT

## 2017-09-07 PROCEDURE — 99207 ZZC NO CHARGE NURSE ONLY: CPT

## 2017-09-07 NOTE — MR AVS SNAPSHOT
Renetta SANTOYO Franklin   9/7/2017 2:45 PM   Anticoagulation Therapy Visit    Description:  46 year old female   Provider[facility]  [facility]   Department:  [facility]           INR as of 9/7/2017     Today's INR 2.6      Anticoagulation Summary as of 9/7/2017     INR goal 2.0-3.0   Today's INR 2.6   Full instructions 1.25 mg on Wed, Fri; 2.5 mg all other days   Next INR check 10/17/2017    Indications   Long-term (current) use of anticoagulants [Z79.01] [Z79.01]  Coagulopathy (H) [D68.9]         Your next Anticoagulation Clinic appointment(s)     Oct 17, 2017  3:15 PM CDT   [facility]ticoagulation Visit with [facility]   [facility] ([facility]    [address] [facility] [address]  [facility] [address] [phone]              Contact Numbers     [facility] Phone Numbers:  Anticoagulation Clinic Appointments : [phone]  Anticoagulation Nurse: [phone]         September 2017 Details    Sun Mon Tue Wed Thu Fri Sat          1               2                 3               4               5               6               7      2.5 mg   See details      8      1.25 mg         9      2.5 mg           10      2.5 mg         11      2.5 mg         12      2.5 mg         13      1.25 mg         14      2.5 mg         15      1.25 mg         16      2.5 mg           17      2.5 mg         18      2.5 mg         19      2.5 mg         20      1.25 mg         21      2.5 mg         22      1.25 mg         23      2.5 mg           24      2.5 mg         25      2.5 mg         26      2.5 mg         27      1.25 mg         28      2.5 mg         29      1.25 mg         30      2.5 mg          Date Details   09/07 This INR check               How to take your warfarin dose     To take:  1.25 mg Take 0.5 of a 2.5 mg tablet.    To take:  2.5 mg Take 1 of the 2.5 mg tablets.           October 2017 Details    Sun Mon Tue Wed Thu Fri Sat     1      2.5 mg         2       2.5 mg         3      2.5 mg         4      1.25 mg         5      2.5 mg         6      1.25 mg         7      2.5 mg           8      2.5 mg         9      2.5 mg         10      2.5 mg         11      1.25 mg         12      2.5 mg         13      1.25 mg         14      2.5 mg           15      2.5 mg         16      2.5 mg         17            18               19               20               21                 22               23               24               25               26               27               28                 29               30               31                    Date Details   No additional details    Date of next INR:  10/17/2017         How to take your warfarin dose     To take:  1.25 mg Take 0.5 of a 2.5 mg tablet.    To take:  2.5 mg Take 1 of the 2.5 mg tablets.

## 2017-09-07 NOTE — PROGRESS NOTES
ANTICOAGULATION FOLLOW-UP CLINIC VISIT    Patient Name:  Renetta Reid  Date:  9/7/2017  Contact Type:  Face to Face    SUBJECTIVE:        OBJECTIVE    INR Protime   Date Value Ref Range Status   09/07/2017 2.6 (A) 0.86 - 1.14 Final     Chromogenic Factor 10   Date Value Ref Range Status   08/05/2010 60 60 - 140 % Final     Comment:     Therapeutic Range: A Chromogenic Factor 10 level of 20-40% inversely   correlates   with an INR of 2-3 for patients receiving Warfarin. Chromogenic Factor 10   levels below 20% indicate an INR greater than 3, and levels above 40%   indicate   an INR lessthan 2.       ASSESSMENT / PLAN  INR assessment THER    Recheck INR In: 6 WEEKS    INR Location Clinic      Anticoagulation Summary as of 9/7/2017     INR goal 2.0-3.0   Today's INR 2.6   Maintenance plan 1.25 mg (2.5 mg x 0.5) on Wed, Fri; 2.5 mg (2.5 mg x 1) all other days   Full instructions 1.25 mg on Wed, Fri; 2.5 mg all other days   Weekly total 15 mg   No change documented Elaine Lu RN   Plan last modified Elaine Lu, RN (7/26/2016)   Next INR check 10/17/2017   Priority INR   Target end date     Indications   Long-term (current) use of anticoagulants [Z79.01] [Z79.01]  Coagulopathy (H) [D68.9]         Anticoagulation Episode Summary     INR check location     Preferred lab     Send INR reminders to [facility]    Comments       Anticoagulation Care Providers     Provider Role Specialty Phone number    Hrotencia Marcus MD [facility] Internal Medicine [phone]            See the Encounter Report to view Anticoagulation Flowsheet and Dosing Calendar (Go to Encounters tab in chart review, and find the Anticoagulation Therapy Visit)    Dosage adjustment made based on physician directed care plan.    Elaine Lu, RN

## 2017-10-19 DIAGNOSIS — D68.9 COAGULOPATHY (H): ICD-10-CM

## 2017-10-19 RX ORDER — WARFARIN SODIUM 2.5 MG/1
TABLET ORAL
Qty: 90 TABLET | Refills: 0 | Status: SHIPPED | OUTPATIENT
Start: 2017-10-19 | End: 2018-01-19

## 2017-10-19 NOTE — TELEPHONE ENCOUNTER
Warfarin 2.5 mg    Last Written Prescription Date: 4/18/17  Last Fill Qty: 90, # refills: 1  Last Office Visit with [facility], [facility] or [facility] prescribing provider: 2/7/17       Date and Result of Last PT/INR:   Lab Results   Component Value Date    INR 2.6 09/07/2017    INR 2.3 07/25/2017    INR 2.4 02/08/2012    INR 2.6 12/15/2010      Prescription approved per [facility] Refill Protocol.  Elaine Lu RN

## 2017-12-13 ENCOUNTER — ANTICOAGULATION THERAPY VISIT (OUTPATIENT)
Dept: ANTICOAGULATION | Facility: CLINIC | Age: 47
End: 2017-12-13
Payer: COMMERCIAL

## 2017-12-13 DIAGNOSIS — D68.9 COAGULOPATHY (H): ICD-10-CM

## 2017-12-13 DIAGNOSIS — Z79.01 LONG-TERM (CURRENT) USE OF ANTICOAGULANTS: ICD-10-CM

## 2017-12-13 LAB — INR POINT OF CARE: 2.1 (ref 0.86–1.14)

## 2017-12-13 PROCEDURE — 99207 ZZC NO CHARGE NURSE ONLY: CPT

## 2017-12-13 PROCEDURE — 36416 COLLJ CAPILLARY BLOOD SPEC: CPT

## 2017-12-13 PROCEDURE — 85610 PROTHROMBIN TIME: CPT | Mod: QW

## 2017-12-13 NOTE — PROGRESS NOTES
ANTICOAGULATION FOLLOW-UP CLINIC VISIT    Patient Name:  Renetta Reid  Date:  12/13/2017  Contact Type:  Face to Face    SUBJECTIVE:     Patient Findings     Positives No Problem Findings           OBJECTIVE    INR Protime   Date Value Ref Range Status   12/13/2017 2.1 (A) 0.86 - 1.14 Final     Chromogenic Factor 10   Date Value Ref Range Status   08/05/2010 60 60 - 140 % Final     Comment:     Therapeutic Range: A Chromogenic Factor 10 level of 20-40% inversely   correlates   with an INR of 2-3 for patients receiving Warfarin. Chromogenic Factor 10   levels below 20% indicate an INR greater than 3, and levels above 40%   indicate   an INR lessthan 2.       ASSESSMENT / PLAN  INR assessment THER    Recheck INR In: 6 WEEKS    INR Location Clinic      Anticoagulation Summary as of 12/13/2017     INR goal 2.0-3.0   Today's INR 2.1   Maintenance plan 1.25 mg (2.5 mg x 0.5) on Wed, Fri; 2.5 mg (2.5 mg x 1) all other days   Full instructions 1.25 mg on Wed, Fri; 2.5 mg all other days   Weekly total 15 mg   No change documented Sharda Guo, RN   Plan last modified Elaine Lu RN (7/26/2016)   Next INR check 1/24/2018   Priority INR   Target end date     Indications   Long-term (current) use of anticoagulants [Z79.01] [Z79.01]  Coagulopathy (H) [D68.9]         Anticoagulation Episode Summary     INR check location     Preferred lab     Send INR reminders to [facility]    Comments       Anticoagulation Care Providers     Provider Role Specialty Phone number    Hortencia Marcus MD [facility] Internal Medicine [phone]            See the Encounter Report to view Anticoagulation Flowsheet and Dosing Calendar (Go to Encounters tab in chart review, and find the Anticoagulation Therapy Visit)    Dosage adjustment made based on physician directed care plan.    Sharda Guo RN

## 2017-12-13 NOTE — MR AVS SNAPSHOT
Renetta SANTOYO Franklin   12/13/2017 9:15 AM   Anticoagulation Therapy Visit    Description:  47 year old female   Provider[facility]  [facility]   Department:  [facility]           INR as of 12/13/2017     Today's INR 2.1      Anticoagulation Summary as of 12/13/2017     INR goal 2.0-3.0   Today's INR 2.1   Full instructions 1.25 mg on Wed, Fri; 2.5 mg all other days   Next INR check 1/24/2018    Indications   Long-term (current) use of anticoagulants [Z79.01] [Z79.01]  Coagulopathy (H) [D68.9]         Your next Anticoagulation Clinic appointment(s)     Jan 24, 2018  9:15 AM CST   Anticoagulation Visit with [facility]   [facility] ([facility]    [address] [facility] [address]  [facility] [address] [phone]              Contact Numbers     [facility] Phone Numbers:  Anticoagulation Clinic Appointments : [phone]  Anticoagulation Nurse: [phone]         December 2017 Details    Sun Mon Tue Wed Thu Fri Sat          1               2                 3               4               5               6               7               8               9                 10               11               12               13      1.25 mg   See details      14      2.5 mg         15      1.25 mg         16      2.5 mg           17      2.5 mg         18      2.5 mg         19      2.5 mg         20      1.25 mg         21      2.5 mg         22      1.25 mg         23      2.5 mg           24      2.5 mg         25      2.5 mg         26      2.5 mg         27      1.25 mg         28      2.5 mg         29      1.25 mg         30      2.5 mg           31      2.5 mg                Date Details   12/13 This INR check               How to take your warfarin dose     To take:  1.25 mg Take 0.5 of a 2.5 mg tablet.    To take:  2.5 mg Take 1 of the 2.5 mg tablets.           January 2018 Details    Sun Mon Tue Wed Thu Fri Sat      1      2.5 mg         2       2.5 mg         3      1.25 mg         4      2.5 mg         5      1.25 mg         6      2.5 mg           7      2.5 mg         8      2.5 mg         9      2.5 mg         10      1.25 mg         11      2.5 mg         12      1.25 mg         13      2.5 mg           14      2.5 mg         15      2.5 mg         16      2.5 mg         17      1.25 mg         18      2.5 mg         19      1.25 mg         20      2.5 mg           21      2.5 mg         22      2.5 mg         23      2.5 mg         24            25               26               27                 28               29               30               31                   Date Details   No additional details    Date of next INR:  1/24/2018         How to take your warfarin dose     To take:  1.25 mg Take 0.5 of a 2.5 mg tablet.    To take:  2.5 mg Take 1 of the 2.5 mg tablets.

## 2018-01-19 DIAGNOSIS — D68.9 COAGULOPATHY (H): ICD-10-CM

## 2018-01-19 RX ORDER — WARFARIN SODIUM 2.5 MG/1
TABLET ORAL
Qty: 90 TABLET | Refills: 0 | Status: SHIPPED | OUTPATIENT
Start: 2018-01-19 | End: 2018-05-07

## 2018-01-19 NOTE — TELEPHONE ENCOUNTER
"Pt due of OV mid February.  Warfarin 2.5 mg      Last Written Prescription Date:  10/19/17  Last Fill Quantity: 90,   # refills: 0  Last Office Visit: 2/7/17  Future Office visit:       Requested Prescriptions   Pending Prescriptions Disp Refills     warfarin (COUMADIN) 2.5 MG tablet [Pharmacy Med Name: WARFARIN SODIUM 2.5 MG TABLET] 90 tablet 0     Sig: TAKE 1 TAB BY MOUTH DAILY EXCEPT TAKE 1/2 TAB ON WEDNESDAY & FRIDAY OR AS INSTRUCTED BY INR RESULT    Vitamin K Antagonists Failed    1/19/2018 12:58 AM       Failed - INR is within goal in the past 6 weeks    Confirm INR is within goal in the past 6 weeks.     Recent Labs   Lab Test 12/13/17   INR  2.1*                      Passed - Recent or future visit with authorizing provider's specialty    Patient had office visit in the last year or has a visit in the next 30 days with authorizing provider.  See \"Patient Info\" tab in inbasket, or \"Choose Columns\" in Meds & Orders section of the refill encounter.            Passed - Patient is 18 years of age or older       Passed - Patient is not pregnant       Passed - No positive pregnancy on file in past 12 months        Prescription approved per [facility] Refill Protocol.  Elaine Lu RN    "

## 2018-01-24 ENCOUNTER — TRANSFERRED RECORDS (OUTPATIENT)
Dept: HEALTH INFORMATION MANAGEMENT | Facility: CLINIC | Age: 48
End: 2018-01-24

## 2018-01-24 ENCOUNTER — ANTICOAGULATION THERAPY VISIT (OUTPATIENT)
Dept: ANTICOAGULATION | Facility: CLINIC | Age: 48
End: 2018-01-24
Payer: COMMERCIAL

## 2018-01-24 DIAGNOSIS — D68.9 COAGULOPATHY (H): ICD-10-CM

## 2018-01-24 DIAGNOSIS — Z79.01 LONG-TERM (CURRENT) USE OF ANTICOAGULANTS: ICD-10-CM

## 2018-01-24 LAB — INR POINT OF CARE: 2.3 (ref 0.86–1.14)

## 2018-01-24 PROCEDURE — 36416 COLLJ CAPILLARY BLOOD SPEC: CPT

## 2018-01-24 PROCEDURE — 99207 ZZC NO CHARGE NURSE ONLY: CPT

## 2018-01-24 PROCEDURE — 85610 PROTHROMBIN TIME: CPT | Mod: QW

## 2018-01-24 NOTE — PROGRESS NOTES
ANTICOAGULATION FOLLOW-UP CLINIC VISIT    Patient Name:  Renetta Reid  Date:  1/24/2018  Contact Type:  Face to Face    SUBJECTIVE:     Patient Findings     Positives No Problem Findings           OBJECTIVE    INR Protime   Date Value Ref Range Status   01/24/2018 2.3 (A) 0.86 - 1.14 Final     Chromogenic Factor 10   Date Value Ref Range Status   08/05/2010 60 60 - 140 % Final     Comment:     Therapeutic Range: A Chromogenic Factor 10 level of 20-40% inversely   correlates   with an INR of 2-3 for patients receiving Warfarin. Chromogenic Factor 10   levels below 20% indicate an INR greater than 3, and levels above 40%   indicate   an INR lessthan 2.       ASSESSMENT / PLAN  INR assessment THER    Recheck INR In: 6 WEEKS    INR Location Clinic      Anticoagulation Summary as of 1/24/2018     INR goal 2.0-3.0   Today's INR 2.3   Maintenance plan 1.25 mg (2.5 mg x 0.5) on Wed, Fri; 2.5 mg (2.5 mg x 1) all other days   Full instructions 1.25 mg on Wed, Fri; 2.5 mg all other days   Weekly total 15 mg   No change documented Sharda Guo, RN   Plan last modified Elaine Lu RN (7/26/2016)   Next INR check 3/7/2018   Priority INR   Target end date     Indications   Long-term (current) use of anticoagulants [Z79.01] [Z79.01]  Coagulopathy (H) [D68.9]         Anticoagulation Episode Summary     INR check location     Preferred lab     Send INR reminders to [facility] [address]    Comments       Anticoagulation Care Providers     Provider Role Specialty Phone number    Hortencia Marcus MD [facility] Internal Medicine [phone]            See the Encounter Report to view Anticoagulation Flowsheet and Dosing Calendar (Go to Encounters tab in chart review, and find the Anticoagulation Therapy Visit)    Dosage adjustment made based on physician directed care plan.    Sharda Guo RN

## 2018-01-24 NOTE — MR AVS SNAPSHOT
Renetta NATANAEL Reid   1/24/2018 9:15 AM   Anticoagulation Therapy Visit    Description:  47 year old female   Provider[facility]  [facility]   Department:  [facility]           INR as of 1/24/2018     Today's INR 2.3      Anticoagulation Summary as of 1/24/2018     INR goal 2.0-3.0   Today's INR 2.3   Full instructions 1.25 mg on Wed, Fri; 2.5 mg all other days   Next INR check 3/7/2018    Indications   Long-term (current) use of anticoagulants [Z79.01] [Z79.01]  Coagulopathy (H) [D68.9]         Your next Anticoagulation Clinic appointment(s)     Mar 07, 2018  3:00 PM CST   Anticoagulation Visit with [facility]   [facility] ([facility])    [address] [facility] [address]  [facility] [address] [phone]              Contact Numbers     [facility] Phone Numbers:  Anticoagulation Clinic Appointments : [phone]  Anticoagulation Nurse: [phone]         January 2018 Details    Sun Mon Tue Wed Thu Fri Sat      1               2               3               4               5               6                 7               8               9               10               11               12               13                 14               15               16               17               18               19               20                 21               22               23               24      1.25 mg   See details      25      2.5 mg         26      1.25 mg         27      2.5 mg           28      2.5 mg         29      2.5 mg         30      2.5 mg         31      1.25 mg             Date Details   01/24 This INR check               How to take your warfarin dose     To take:  1.25 mg Take 0.5 of a 2.5 mg tablet.    To take:  2.5 mg Take 1 of the 2.5 mg tablets.           February 2018 Details    Sun Mon Tue Wed Thu Fri Sat         1      2.5 mg         2      1.25 mg         3      2.5 mg           4      2.5 mg         5      2.5 mg          6      2.5 mg         7      1.25 mg         8      2.5 mg         9      1.25 mg         10      2.5 mg           11      2.5 mg         12      2.5 mg         13      2.5 mg         14      1.25 mg         15      2.5 mg         16      1.25 mg         17      2.5 mg           18      2.5 mg         19      2.5 mg         20      2.5 mg         21      1.25 mg         22      2.5 mg         23      1.25 mg         24      2.5 mg           25      2.5 mg         26      2.5 mg         27      2.5 mg         28      1.25 mg             Date Details   No additional details            How to take your warfarin dose     To take:  1.25 mg Take 0.5 of a 2.5 mg tablet.    To take:  2.5 mg Take 1 of the 2.5 mg tablets.           March 2018 Details    Sun Mon Tue Wed Thu Fri Sat         1      2.5 mg         2      1.25 mg         3      2.5 mg           4      2.5 mg         5      2.5 mg         6      2.5 mg         7            8               9               10                 11               12               13               14               15               16               17                 18               19               20               21               22               23               24                 25               26               27               28               29               30               31                Date Details   No additional details    Date of next INR:  3/7/2018         How to take your warfarin dose     To take:  1.25 mg Take 0.5 of a 2.5 mg tablet.    To take:  2.5 mg Take 1 of the 2.5 mg tablets.

## 2018-01-31 ENCOUNTER — TRANSFERRED RECORDS (OUTPATIENT)
Dept: HEALTH INFORMATION MANAGEMENT | Facility: CLINIC | Age: 48
End: 2018-01-31

## 2018-01-31 ENCOUNTER — TELEPHONE (OUTPATIENT)
Dept: INTERNAL MEDICINE | Facility: CLINIC | Age: 48
End: 2018-01-31

## 2018-01-31 NOTE — TELEPHONE ENCOUNTER
Late entry: 1030 today pt left voice message stating having Meniscus knee surgery 2/12/18 has a Pre-Op with PCP 2/6/18. Patient states is on Warfarin and asking if needing to stop Warfarin or do something different and when to start that?    Please advised on stop date for warfarin and Bridging if needed.  Routed to [facility] and Provider.  Provider please review and advise. Thank you.

## 2018-02-01 NOTE — TELEPHONE ENCOUNTER
Call to pt. Updated. States surgeon was not sure if anything would need to be done prior to preop. Routed to PCP. No need to call pt back if agree with Dr. Moran.

## 2018-02-06 ENCOUNTER — OFFICE VISIT (OUTPATIENT)
Dept: INTERNAL MEDICINE | Facility: CLINIC | Age: 48
End: 2018-02-06
Payer: COMMERCIAL

## 2018-02-06 VITALS
TEMPERATURE: 97.4 F | BODY MASS INDEX: 49.13 KG/M2 | OXYGEN SATURATION: 96 % | WEIGHT: 267 LBS | HEIGHT: 62 IN | HEART RATE: 76 BPM | SYSTOLIC BLOOD PRESSURE: 132 MMHG | RESPIRATION RATE: 16 BRPM | DIASTOLIC BLOOD PRESSURE: 84 MMHG

## 2018-02-06 DIAGNOSIS — Z01.818 PREOP GENERAL PHYSICAL EXAM: Primary | ICD-10-CM

## 2018-02-06 DIAGNOSIS — D68.9 COAGULOPATHY (H): ICD-10-CM

## 2018-02-06 DIAGNOSIS — S83.207A TEAR OF MENISCUS OF LEFT KNEE AS CURRENT INJURY, UNSPECIFIED MENISCUS, UNSPECIFIED TEAR TYPE, INITIAL ENCOUNTER: ICD-10-CM

## 2018-02-06 PROCEDURE — 99215 OFFICE O/P EST HI 40 MIN: CPT | Performed by: INTERNAL MEDICINE

## 2018-02-06 NOTE — PROGRESS NOTES
[place] [address]  [facility] [address]  [phone]  Dept: [phone]    PRE-OP EVALUATION:  Today's date: 2018    Renetta Reid (: 1970) presents for pre-operative evaluation assessment as requested by Dr. Panda Manzo.  She requires evaluation and anesthesia risk assessment prior to undergoing surgery/procedure for treatment of left knee meniscal tear .  Proposed procedure: Left Knee Arthroscopic Partial Medial Meniscectomy     Date of Surgery/ Procedure: 2018  Time of Surgery/ Procedure: Unknown  Hospital/Surgical Facility: [facility]  Fax number for surgical facility: [phone]  Primary Physician: Hortencia Marcus  Type of Anesthesia Anticipated: to be determined    Patient has a Health Care Directive or Living Will:  NO    Preop Questions 2018   1.  Do you have a history of heart attack, stroke, stent, bypass or surgery on an artery in the head, neck, heart or legs? No   2.  Do you ever have any pain or discomfort in your chest? No   3.  Do you have a history of  Heart Failure? No   4.   Are you troubled by shortness of breath when:  walking on a level surface, or up a slight hill, or at night? No   5.  Do you currently have a cold, bronchitis or other respiratory infection? No     6.  Do you have a cough, shortness of breath, or wheezing? No   7.  Do you sometimes get pains in the calves of your legs when you walk? No   8. Do you or anyone in your family have previous history of blood clots? YES: DVT, has lupus anticoagulant   9.  Do you or does anyone in your family have a serious bleeding problem such as prolonged bleeding following surgeries or cuts? No   10. Have you ever had problems with anemia or been told to take iron pills? No   11. Have you had any abnormal blood loss such as black, tarry or bloody stools, or abnormal vaginal bleeding? No   12. Have you ever had a blood transfusion? No   13. Have you or any of your relatives  ever had problems with anesthesia? No   14. Do you have sleep apnea, excessive snoring or daytime drowsiness? No   15. Do you have any prosthetic heart valves? No   16. Do you have prosthetic joints? No   17. Is there any chance that you may be pregnant? No       HPI:                                                      Brief HPI related to upcoming procedure: she has meniscal tear causing pain, not managed by conservative treatment      See problem list for active medical problems.  Problems all longstanding and stable, except as noted/documented.  See ROS for pertinent symptoms related to these conditions.                                                                                                  .    MEDICAL HISTORY:                                                      Patient Active Problem List    Diagnosis Date Noted     Long-term (current) use of anticoagulants [Z79.01] 05/06/2016     Priority: Medium     Gastroesophageal reflux disease without esophagitis 12/17/2015     Priority: Medium     Elevated blood pressure reading without diagnosis of hypertension 02/01/2013     Priority: Medium     Coagulopathy (H)      Priority: Medium     Lupus inhibitor, antcardiolipin antibody       HSP (Henoch Schonlein purpura) (H)      Priority: Medium     with Vasculitis       CARDIOVASCULAR SCREENING; LDL GOAL LESS THAN 160 10/31/2010     Priority: Medium     Calculus of kidney 05/13/2003     Priority: Medium      Past Medical History:   Diagnosis Date     Calculus of kidney 97.2000 &2003    Stones occurred with pregnancies.     Coagulopathy (H) 8/10    Lupus inhibitor, antcardiolipin antibody     Diverticulitis of colon (without mention of hemorrhage) 10/00     DVT (deep venous thrombosis) (H) 8/10    right leg     Esophageal reflux      Family history of coronary artery disease     Brother 40     HSP (Henoch Schonlein purpura) (H) 11/10    with Vasculitis     Normal delivery 1997, 2000     Pulmonary emboli (H) 8/10  "    Rosacea      Urticaria, unspecified     episodic     No past surgical history on file.     Current Outpatient Prescriptions   Medication Sig Dispense Refill     warfarin (COUMADIN) 2.5 MG tablet TAKE 1 TAB BY MOUTH DAILY EXCEPT TAKE 1/2 TAB ON WEDNESDAY & FRIDAY OR AS INSTRUCTED BY INR RESULT 90 tablet 0     omeprazole (PRILOSEC) 20 MG CR capsule Take 1 capsule (20 mg) by mouth daily 90 capsule 3     hydrOXYzine (ATARAX) 10 MG tablet Take 1 tablet (10 mg) by mouth At Bedtime 90 tablet 3     OTC products: None, except as noted above    Allergies   Allergen Reactions     Seasonal Allergies       Latex Allergy: NO    Social History   Substance Use Topics     Smoking status: Never Smoker     Smokeless tobacco: Never Used     Alcohol use Yes      Comment: socially     History   Drug Use No       REVIEW OF SYSTEMS:                                                    GENERAL: negative for, fever, chills, weight loss, weight gain  EYES: negative  ENT: negative  RESPIRATORY: No dyspnea on exertion and No cough  CARDIOVASCULAR: negative for, palpitations, tachycardia, irregular heart beat and chest pain  GI: negative for, nausea, heartburn, abdominal pain, melena and hematochezia  : negative for, dysuria and hematuria  MUSCULOSKELETAL: knee pain  NEUROLOGIC: negative for, headaches, seizures, local weakness, numbness or tingling of hands and numbness or tingling of feet  SKIN: negative  ENDOCRINE: negative         EXAM:                                                        Patient is alert, oriented, cooperative in no acute distress.  /84  Pulse 76  Temp 97.4  F (36.3  C) (Oral)  Resp 16  Ht 5' 2\" (1.575 m)  Wt 267 lb (121.1 kg)  SpO2 96%  BMI 48.83 kg/m2    HEENT: PERRL, EOMI, TM's are normal. Oropharynx is clear.  NECK: No lymphadenopathy or thyromegaly. Carotid pulses full without bruits.  LUNGS: clear  CV: normal S1, S2 without murmur, S3 or S4 present. Pulses are 2/2 throughout. No JVD.  ABDOMEN: Bowel " sounds present, nontender without hepatosplenomegaly. Liver is normal size to percussion.  EXTREMITIES: no edema present, unremarkable joints  NEUROLOGIC: Cranial nerves II-XII intact, reflexes 2/4 throughout, strength 5/5, sensation grossly intact, gait normal.  SKIN: without rashes or significant lesions     DIAGNOSTICS:                                                    No labs or EKG required for low risk surgery (cataract, skin procedure, breast biopsy, etc)    Recent Labs   Lab Test 01/24/18 12/13/17 02/07/17   0939   11/07/14   1011   05/29/12   0935   08/03/10   0630  08/02/10   1610   HGB   --    --    --    --    --    --    --   11.8   --   13.5  12.8   PLT   --    --    --    --    --    --    --    --    --   122*  147*   INR  2.3*  2.1*   < >   --    < >   --    < >   --    < >  1.08  1.04   NA   --    --    --   138   --   138   --   139   --   137  136   POTASSIUM   --    --    --   4.2   --   4.5   --   4.3   < >  3.6  3.7   CR   --    --    --   0.66   --   0.66   --   0.62   < >  0.66  0.65    < > = values in this interval not displayed.        IMPRESSION:                                                    Reason for surgery/procedure: meniscal tear left knee  Diagnosis/reason for consult: preop    The proposed surgical procedure is considered LOW risk.    REVISED CARDIAC RISK INDEX  The patient has the following serious cardiovascular risks for perioperative complications such as (MI, PE, VFib and 3  AV Block):  No serious cardiac risks  INTERPRETATION: 0 risks: Class I (very low risk - 0.4% complication rate)    The patient has the following additional risks for perioperative complications:  No identified additional risks      ICD-10-CM    1. Preop general physical exam Z01.818    2. Tear of meniscus of left knee as current injury, unspecified meniscus, unspecified tear type, initial encounter S83.207A    3. Coagulopathy (H) D68.9 enoxaparin (LOVENOX) 120 MG/0.8ML injection        RECOMMENDATIONS:                                                            Anticoagulant or Antiplatelet Medication Use  WARFARIN: Bridging therapy with Lovenox/IV heparin recommended to start 5 days before surgery. She will take 120 mg bid, hold the night before and am of surgery, resume warfarin and lovenox the night after surgery per surgeon, recheck INR on 2/16.         APPROVAL GIVEN to proceed with proposed procedure, without further diagnostic evaluation       Signed Electronically by: Hortencia Marcus MD    Copy of this evaluation report is provided to requesting physician.    [facility] Preop Guidelines

## 2018-02-06 NOTE — MR AVS SNAPSHOT
After Visit Summary   2/6/2018    Renetta Reid    MRN: [id number]           Patient Information     Date Of Birth          1970        Visit Information        Provider Department      2/6/2018 10:40 AM Hortencia Marcus MD [facility]        Today's Diagnoses     Preop general physical exam    -  1    Coagulopathy (H)          Care Instructions    Take your last warfarin tonight. Start the Lovenox 120 mg tomorrow night. Give every 12 hours with the last dose on the morning of 2/11. Restart warfarin and lovenox in the evening on 2/12 and continue both until INR check.       Before Your Surgery      Call your surgeon if there is any change in your health. This includes signs of a cold or flu (such as a sore throat, runny nose, cough, rash or fever).    Do not smoke, drink alcohol or take over the counter medicine (unless your surgeon or primary care doctor tells you to) for the 24 hours before and after surgery.    If you take prescribed drugs: Follow your doctor s orders about which medicines to take and which to stop until after surgery.    Eating and drinking prior to surgery: follow the instructions from your surgeon    Take a shower or bath the night before surgery. Use the soap your surgeon gave you to gently clean your skin. If you do not have soap from your surgeon, use your regular soap. Do not shave or scrub the surgery site.  Wear clean pajamas and have clean sheets on your bed.           Follow-ups after your visit        Your next 10 appointments already scheduled     Mar 07, 2018  3:00 PM CST   Anticoagulation Visit with [facility]   [facility] ([facility])    [address] [facility] [address]  [facility] [address] [phone]              Who to contact     If you have questions or need follow up information about today's clinic visit or your schedule please contact [facility] directly at  "[phone].  Normal or non-critical lab and imaging results will be communicated to you by [employer], letter or phone within 4 business days after the clinic has received the results. If you do not hear from us within 7 days, please contact the clinic through [employer] or phone. If you have a critical or abnormal lab result, we will notify you by phone as soon as possible.  Submit refill requests through [employer] or call your pharmacy and they will forward the refill request to us. Please allow 3 business days for your refill to be completed.          Additional Information About Your Visit        [employer] Information     [employer] gives you secure access to your electronic health record. If you see a primary care provider, you can also send messages to your care team and make appointments. If you have questions, please call your primary care clinic.  If you do not have a primary care provider, please call [phone] and they will assist you.        Care EveryWhere ID     This is your Care EveryWhere ID. This could be used by other organizations to access your [place] medical records  [id number]        Your Vitals Were     Pulse Temperature Respirations Height Pulse Oximetry BMI (Body Mass Index)    76 97.4  F (36.3  C) (Oral) 16 5' 2\" (1.575 m) 96% 48.83 kg/m2       Blood Pressure from Last 3 Encounters:   02/06/18 132/84   02/07/17 110/82   01/04/16 110/84    Weight from Last 3 Encounters:   02/06/18 267 lb (121.1 kg)   02/07/17 248 lb 12.8 oz (112.9 kg)   01/04/16 266 lb (120.7 kg)              Today, you had the following     No orders found for display         Today's Medication Changes          These changes are accurate as of 2/6/18 11:31 AM.  If you have any questions, ask your nurse or doctor.               Start taking these medicines.        Dose/Directions    enoxaparin 120 MG/0.8ML injection   Commonly known as:  LOVENOX   Used for:  Coagulopathy (H)   Started by:  Hortencia Marcus MD        Dose:  120 mg "   Inject 0.8 mLs (120 mg) Subcutaneous every 12 hours   Quantity:  14 Syringe   Refills:  0            Where to get your medicines      These medications were sent to [place] [address] [place] [facility] [address] [facility]  [address] [facility] [address]    Hours:  Tech issues with their phone system Phone:  [phone]     enoxaparin 120 MG/0.8ML injection                Primary Care Provider Office Phone # Fax #    Hortnecia Marcus [phone]       [address]  [facility] [address]        Equal Access to Services     [facility]: Hadii aad ku hadasho Soomaali, waaxda luqadaha, qaybta kaalmada adeegyada, waxay nafisa schmitz . So [facility] [phone].    ATENCIÓN: Si habla español, tiene a yarbrough disposición servicios gratuitos de asistencia lingüística. [facility] [phone].    We comply with applicable federal civil rights laws and [place] laws. We do not discriminate on the basis of race, color, national origin, age, disability, sex, sexual orientation, or gender identity.            Thank you!     Thank you for choosing [facility]  for your care. Our goal is always to provide you with excellent care. Hearing back from our patients is one way we can continue to improve our services. Please take a few minutes to complete the written survey that you may receive in the mail after your visit with us. Thank you!             Your Updated Medication List - Protect others around you: Learn how to safely use, store and throw away your medicines at www.disposemymeds.org.          This list is accurate as of 2/6/18 11:31 AM.  Always use your most recent med list.                   Brand Name Dispense Instructions for use Diagnosis    enoxaparin 120 MG/0.8ML injection    LOVENOX    14 Syringe    Inject 0.8 mLs (120 mg) Subcutaneous every 12 hours    Coagulopathy (H)       hydrOXYzine 10 MG tablet    ATARAX    90 tablet    Take 1 tablet (10 mg) by  mouth At Bedtime    Urticaria       omeprazole 20 MG CR capsule    priLOSEC    90 capsule    Take 1 capsule (20 mg) by mouth daily    Gastroesophageal reflux disease without esophagitis       warfarin 2.5 MG tablet    COUMADIN    90 tablet    TAKE 1 TAB BY MOUTH DAILY EXCEPT TAKE 1/2 TAB ON WEDNESDAY & FRIDAY OR AS INSTRUCTED BY INR RESULT    Coagulopathy (H)

## 2018-02-06 NOTE — PATIENT INSTRUCTIONS
Take your last warfarin tonight. Start the Lovenox 120 mg tomorrow night. Give every 12 hours with the last dose on the morning of 2/11. Restart warfarin and lovenox in the evening on 2/12 and continue both until INR check.       Before Your Surgery      Call your surgeon if there is any change in your health. This includes signs of a cold or flu (such as a sore throat, runny nose, cough, rash or fever).    Do not smoke, drink alcohol or take over the counter medicine (unless your surgeon or primary care doctor tells you to) for the 24 hours before and after surgery.    If you take prescribed drugs: Follow your doctor s orders about which medicines to take and which to stop until after surgery.    Eating and drinking prior to surgery: follow the instructions from your surgeon    Take a shower or bath the night before surgery. Use the soap your surgeon gave you to gently clean your skin. If you do not have soap from your surgeon, use your regular soap. Do not shave or scrub the surgery site.  Wear clean pajamas and have clean sheets on your bed.

## 2018-02-06 NOTE — NURSING NOTE
"Chief Complaint   Patient presents with     Pre-Op Exam     Bridging before surgery        Initial /84  Pulse 76  Temp 97.4  F (36.3  C) (Oral)  Resp 16  Ht 5' 2\" (1.575 m)  Wt 267 lb (121.1 kg)  SpO2 96%  BMI 48.83 kg/m2 Estimated body mass index is 48.83 kg/(m^2) as calculated from the following:    Height as of this encounter: 5' 2\" (1.575 m).    Weight as of this encounter: 267 lb (121.1 kg).  Medication Reconciliation: complete      Merissa Goff CMA      "

## 2018-02-12 ENCOUNTER — ANTICOAGULATION THERAPY VISIT (OUTPATIENT)
Dept: ANTICOAGULATION | Facility: CLINIC | Age: 48
End: 2018-02-12
Payer: COMMERCIAL

## 2018-02-12 DIAGNOSIS — Z79.01 LONG-TERM (CURRENT) USE OF ANTICOAGULANTS: ICD-10-CM

## 2018-02-12 DIAGNOSIS — D68.9 COAGULOPATHY (H): ICD-10-CM

## 2018-02-12 LAB — INR POINT OF CARE: 1.2 (ref 0.86–1.14)

## 2018-02-12 PROCEDURE — 85610 PROTHROMBIN TIME: CPT | Mod: QW

## 2018-02-12 PROCEDURE — 99207 ZZC NO CHARGE NURSE ONLY: CPT

## 2018-02-12 PROCEDURE — 36416 COLLJ CAPILLARY BLOOD SPEC: CPT

## 2018-02-12 NOTE — MR AVS SNAPSHOT
Renetta SANTOYO [place]   2/12/2018 1:00 PM   Anticoagulation Therapy Visit    Description:  47 year old female   Provider:  [facility]   Department:  [facility]           INR as of 2/12/2018     Today's INR 1.2!      Anticoagulation Summary as of 2/12/2018     INR goal 2.0-3.0   Today's INR 1.2!   Full instructions 1.25 mg on Wed, Fri; 2.5 mg all other days   Next INR check 2/16/2018    Indications   Long-term (current) use of anticoagulants [Z79.01] [Z79.01]  Coagulopathy (H) [D68.9]         Your next Anticoagulation Clinic appointment(s)     Feb 12, 2018  1:00 PM CST   [facility]agulation Visit with [facility]   [facility]    [address] [facility] [address]  [facility] [id number] [phone]            Feb 16, 2018  1:15 PM CST   Anticoagulation Visit with [facility]   [facility] ([facility]    [address] [facility] [address]  [facility] [id number] [phone]              Contact Numbers     [facility] Phone Numbers:  Anticoagulation Clinic Appointments : [phone]  Anticoagulation Nurse: [phone]         February 2018 Details    Sun Mon Tue Wed Thu Fri Sat         1               2               3                 4               5               6               7               8               9               10                 11               12      2.5 mg   See details      13      2.5 mg         14      1.25 mg         15      2.5 mg         16            17                 18               19               20               21               22               23               24                 25               26               27               28                   Date Details   02/12 This INR check       Date of next INR:  2/16/2018         How to take your warfarin dose     To take:  1.25 mg Take 0.5 of a 2.5 mg tablet.    To take:  2.5 mg Take 1 of the 2.5 mg tablets.

## 2018-02-12 NOTE — PROGRESS NOTES
ANTICOAGULATION FOLLOW-UP CLINIC VISIT    Patient Name:  Renetta Reid  Date:  2/12/2018  Contact Type:  Face to Face    SUBJECTIVE:     Patient Findings     Positives Intentional hold of therapy (Pt has held warfarin since 2/6/18 per PCP, has surgery today. )    Comments Pt reports has surgery today, INR needs to be 1.5 or less. Per 2/6/18 Pre-OP is to resume Lovenox and Warfarin after surgery and have INR check 2/16/18           OBJECTIVE    INR Protime   Date Value Ref Range Status   02/12/2018 1.2 (A) 0.86 - 1.14 Final     Chromogenic Factor 10   Date Value Ref Range Status   08/05/2010 60 60 - 140 % Final     Comment:     Therapeutic Range: A Chromogenic Factor 10 level of 20-40% inversely   correlates   with an INR of 2-3 for patients receiving Warfarin. Chromogenic Factor 10   levels below 20% indicate an INR greater than 3, and levels above 40%   indicate   an INR lessthan 2.       ASSESSMENT / PLAN  INR assessment SUB    Recheck INR In: 4 DAYS    INR Location Clinic      Anticoagulation Summary as of 2/12/2018     INR goal 2.0-3.0   Today's INR 1.2!   Maintenance plan 1.25 mg (2.5 mg x 0.5) on Wed, Fri; 2.5 mg (2.5 mg x 1) all other days   Full instructions 1.25 mg on Wed, Fri; 2.5 mg all other days   Weekly total 15 mg   No change documented Elaine Lu, RN   Plan last modified Elaine Lu, RN (7/26/2016)   Next INR check 2/16/2018   Priority INR   Target end date     Indications   Long-term (current) use of anticoagulants [Z79.01] [Z79.01]  Coagulopathy (H) [D68.9]         Anticoagulation Episode Summary     INR check location     Preferred lab     Send INR reminders to [facility]    Comments       Anticoagulation Care Providers     Provider Role Specialty Phone number    Hortencia Marcus MD [facility] Internal Medicine [phone]            See the Encounter Report to view Anticoagulation Flowsheet and Dosing Calendar (Go to Encounters tab in chart review, and find the Anticoagulation Therapy  Visit)    Dosage adjustment made based on physician directed care plan.    Elaine Lu RN

## 2018-02-16 ENCOUNTER — ANTICOAGULATION THERAPY VISIT (OUTPATIENT)
Dept: ANTICOAGULATION | Facility: CLINIC | Age: 48
End: 2018-02-16
Payer: COMMERCIAL

## 2018-02-16 DIAGNOSIS — Z79.01 LONG-TERM (CURRENT) USE OF ANTICOAGULANTS: ICD-10-CM

## 2018-02-16 DIAGNOSIS — D68.9 COAGULOPATHY (H): ICD-10-CM

## 2018-02-16 LAB — INR POINT OF CARE: 1.2 (ref 0.86–1.14)

## 2018-02-16 PROCEDURE — 85610 PROTHROMBIN TIME: CPT | Mod: QW

## 2018-02-16 PROCEDURE — 36416 COLLJ CAPILLARY BLOOD SPEC: CPT

## 2018-02-16 PROCEDURE — 99207 ZZC NO CHARGE NURSE ONLY: CPT

## 2018-02-16 NOTE — PROGRESS NOTES
ANTICOAGULATION FOLLOW-UP CLINIC VISIT    Patient Name:  Renetta Reid  Date:  2/16/2018  Contact Type:  Face to Face    SUBJECTIVE:     Patient Findings     Positives Intentional hold of therapy (Intentionally held for surgery this week.  Pt is using Lovenox bridging and will continue)           OBJECTIVE    INR Protime   Date Value Ref Range Status   02/16/2018 1.2 (A) 0.86 - 1.14 Final     Chromogenic Factor 10   Date Value Ref Range Status   08/05/2010 60 60 - 140 % Final     Comment:     Therapeutic Range: A Chromogenic Factor 10 level of 20-40% inversely   correlates   with an INR of 2-3 for patients receiving Warfarin. Chromogenic Factor 10   levels below 20% indicate an INR greater than 3, and levels above 40%   indicate   an INR lessthan 2.       ASSESSMENT / PLAN  INR assessment SUB    Recheck INR In: 3 DAYS    INR Location Clinic      Anticoagulation Summary as of 2/16/2018     INR goal 2.0-3.0   Today's INR 1.2!   Maintenance plan 1.25 mg (2.5 mg x 0.5) on Wed, Fri; 2.5 mg (2.5 mg x 1) all other days   Full instructions 2/16: 5 mg; 2/17: 3.75 mg; 2/18: 5 mg; Otherwise 1.25 mg on Wed, Fri; 2.5 mg all other days   Weekly total 15 mg   Plan last modified Elaine Lu RN (7/26/2016)   Next INR check 2/19/2018   Priority INR   Target end date     Indications   Long-term (current) use of anticoagulants [Z79.01] [Z79.01]  Coagulopathy (H) [D68.9]         Anticoagulation Episode Summary     INR check location     Preferred lab     Send INR reminders to [facility]    Comments       Anticoagulation Care Providers     Provider Role Specialty Phone number    Hortencia Marcus MD Responsible Internal Medicine [phone]            See the Encounter Report to view Anticoagulation Flowsheet and Dosing Calendar (Go to Encounters tab in chart review, and find the Anticoagulation Therapy Visit)    Dosage adjustment made based on physician directed care plan.    Jennifer Matias RN

## 2018-02-16 NOTE — MR AVS SNAPSHOT
Renetta NATANAEL Reid   2/16/2018 1:15 PM   Anticoagulation Therapy Visit    Description:  47 year old female   Provider[facility]  [facility]   Department:  [facility]           INR as of 2/16/2018     Today's INR 1.2!      Anticoagulation Summary as of 2/16/2018     INR goal 2.0-3.0   Today's INR 1.2!   Full instructions 2/16: 5 mg; 2/17: 3.75 mg; 2/18: 5 mg; Otherwise 1.25 mg on Wed, Fri; 2.5 mg all other days   Next INR check 2/19/2018    Indications   Long-term (current) use of anticoagulants [Z79.01] [Z79.01]  Coagulopathy (H) [D68.9]         Your next Anticoagulation Clinic appointment(s)     Feb 19, 2018  3:45 PM CST   [facility]    [address] [address]  [facility] [id number] [phone]            Mar 07, 2018  3:00 PM CST   [facility]   [facility]    [address] [facility] [facility] [address]  [facility] [address] [phone]              Contact Numbers     [facility] Phone Numbers:  Anticoagulation Clinic Appointments : [phone]  Anticoagulation Nurse: [phone]         February 2018 Details    Sun Mon Tue Wed Thu Fri Sat         1               2               3                 4               5               6               7               8               9               10                 11               12               13               14               15               16      5 mg   See details      17      3.75 mg           18      5 mg         19            20               21               22               23               24                 25               26               27               28                   Date Details   02/16 This INR check       Date of next INR:  2/19/2018         How to take your warfarin dose     To take:  2.5 mg Take 1 of the 2.5 mg tablets.    To take:   3.75 mg Take 1.5 of the 2.5 mg tablets.    To take:  5 mg Take 2 of the 2.5 mg tablets.

## 2018-02-20 ENCOUNTER — ANTICOAGULATION THERAPY VISIT (OUTPATIENT)
Dept: ANTICOAGULATION | Facility: CLINIC | Age: 48
End: 2018-02-20
Payer: COMMERCIAL

## 2018-02-20 DIAGNOSIS — Z79.01 LONG-TERM (CURRENT) USE OF ANTICOAGULANTS: ICD-10-CM

## 2018-02-20 DIAGNOSIS — D68.9 COAGULOPATHY (H): ICD-10-CM

## 2018-02-20 LAB — INR POINT OF CARE: 2.3 (ref 0.86–1.14)

## 2018-02-20 PROCEDURE — 99207 ZZC NO CHARGE NURSE ONLY: CPT

## 2018-02-20 PROCEDURE — 36416 COLLJ CAPILLARY BLOOD SPEC: CPT

## 2018-02-20 PROCEDURE — 85610 PROTHROMBIN TIME: CPT | Mod: QW

## 2018-02-20 NOTE — PROGRESS NOTES
ANTICOAGULATION FOLLOW-UP CLINIC VISIT    Patient Name:  Renetta Reid  Date:  2/20/2018  Contact Type:  Face to Face    SUBJECTIVE:     Patient Findings     Positives No Problem Findings    Comments Pt took last dose of Lovenox 2/19/18 in the AM.            OBJECTIVE    INR Protime   Date Value Ref Range Status   02/20/2018 2.3 (A) 0.86 - 1.14 Final     Chromogenic Factor 10   Date Value Ref Range Status   08/05/2010 60 60 - 140 % Final     Comment:     Therapeutic Range: A Chromogenic Factor 10 level of 20-40% inversely   correlates   with an INR of 2-3 for patients receiving Warfarin. Chromogenic Factor 10   levels below 20% indicate an INR greater than 3, and levels above 40%   indicate   an INR lessthan 2.       ASSESSMENT / PLAN  INR assessment THER    Recheck INR In: 1 WEEK    INR Location Clinic      Anticoagulation Summary as of 2/20/2018     INR goal 2.0-3.0   Today's INR 2.3   Maintenance plan 1.25 mg (2.5 mg x 0.5) on Wed, Fri; 2.5 mg (2.5 mg x 1) all other days   Full instructions 1.25 mg on Wed, Fri; 2.5 mg all other days   Weekly total 15 mg   Plan last modified Elaine Lu RN (7/26/2016)   Next INR check 2/27/2018   Priority INR   Target end date     Indications   Long-term (current) use of anticoagulants [Z79.01] [Z79.01]  Coagulopathy (H) [D68.9]         Anticoagulation Episode Summary     INR check location     Preferred lab     Send INR reminders to [facility]    Comments       Anticoagulation Care Providers     Provider Role Specialty Phone number    Hortencia Marcus MD [facility] Internal Medicine [phone]            See the Encounter Report to view Anticoagulation Flowsheet and Dosing Calendar (Go to Encounters tab in chart review, and find the Anticoagulation Therapy Visit)    Dosage adjustment made based on physician directed care plan.    Elaine Lu, RN

## 2018-02-20 NOTE — MR AVS SNAPSHOT
Renetta NATANAEL Reid   2/20/2018 2:45 PM   Anticoagulation Therapy Visit    Description:  47 year old female   Provider[facility]   Department:  [facility]           INR as of 2/20/2018     Today's INR 2.3      Anticoagulation Summary as of 2/20/2018     INR goal 2.0-3.0   Today's INR 2.3   Full instructions 1.25 mg on Wed, Fri; 2.5 mg all other days   Next INR check 2/27/2018    Indications   Long-term (current) use of anticoagulants [Z79.01] [Z79.01]  Coagulopathy (H) [D68.9]         Your next Anticoagulation Clinic appointment(s)     Feb 27, 2018  3:45 PM CST   [facility]   [facility]    [address] [facility] [address]  [facility] [id number] [phone]            Mar 07, 2018  3:00 PM CST   [facility]   [facility]    [address] [facility] [address]  [facility] [id number] [phone]              Contact Numbers     [facility] Phone Numbers:  Anticoagulation Clinic Appointments : [phone]  Anticoagulation Nurse: [phone]         February 2018 Details    Sun Mon Tue Wed Thu Fri Sat         1               2               3                 4               5               6               7               8               9               10                 11               12               13               14               15               16               17                 18               19               20      2.5 mg   See details      21      1.25 mg         22      2.5 mg         23      1.25 mg         24      2.5 mg           25      2.5 mg         26      2.5 mg         27            28                   Date Details   02/20 This INR check       Date of next INR:  2/27/2018         How to take your warfarin dose     To take:  1.25 mg Take 0.5 of a 2.5 mg tablet.    To take:  2.5 mg Take 1 of the  2.5 mg tablets.

## 2018-02-26 ENCOUNTER — HOSPITAL ENCOUNTER (OUTPATIENT)
Dept: ULTRASOUND IMAGING | Facility: CLINIC | Age: 48
Discharge: HOME | End: 2018-02-26
Attending: PHYSICIAN ASSISTANT | Admitting: PHYSICIAN ASSISTANT
Payer: COMMERCIAL

## 2018-02-26 ENCOUNTER — TRANSFERRED RECORDS (OUTPATIENT)
Dept: HEALTH INFORMATION MANAGEMENT | Facility: CLINIC | Age: 48
End: 2018-02-26

## 2018-02-26 DIAGNOSIS — M79.669 CALF PAIN: ICD-10-CM

## 2018-02-26 PROCEDURE — 93971 EXTREMITY STUDY: CPT | Mod: LT

## 2018-02-27 ENCOUNTER — ANTICOAGULATION THERAPY VISIT (OUTPATIENT)
Dept: ANTICOAGULATION | Facility: CLINIC | Age: 48
End: 2018-02-27
Payer: COMMERCIAL

## 2018-02-27 DIAGNOSIS — D68.9 COAGULOPATHY (H): ICD-10-CM

## 2018-02-27 DIAGNOSIS — Z79.01 LONG-TERM (CURRENT) USE OF ANTICOAGULANTS: ICD-10-CM

## 2018-02-27 LAB — INR POINT OF CARE: 2.5 (ref 0.86–1.14)

## 2018-02-27 PROCEDURE — 36416 COLLJ CAPILLARY BLOOD SPEC: CPT

## 2018-02-27 PROCEDURE — 99207 ZZC NO CHARGE NURSE ONLY: CPT

## 2018-02-27 PROCEDURE — 85610 PROTHROMBIN TIME: CPT | Mod: QW

## 2018-02-27 NOTE — PROGRESS NOTES
ANTICOAGULATION FOLLOW-UP CLINIC VISIT    Patient Name:  Renetta Reid  Date:  2/27/2018  Contact Type:  Face to Face    SUBJECTIVE:     Patient Findings     Positives No Problem Findings           OBJECTIVE    INR Protime   Date Value Ref Range Status   02/27/2018 2.5 (A) 0.86 - 1.14 Final     Chromogenic Factor 10   Date Value Ref Range Status   08/05/2010 60 60 - 140 % Final     Comment:     Therapeutic Range: A Chromogenic Factor 10 level of 20-40% inversely   correlates   with an INR of 2-3 for patients receiving Warfarin. Chromogenic Factor 10   levels below 20% indicate an INR greater than 3, and levels above 40%   indicate   an INR lessthan 2.       ASSESSMENT / PLAN  INR assessment THER    Recheck INR In: 3 WEEKS    INR Location Clinic      Anticoagulation Summary as of 2/27/2018     INR goal 2.0-3.0   Today's INR 2.5   Maintenance plan 1.25 mg (2.5 mg x 0.5) on Wed, Fri; 2.5 mg (2.5 mg x 1) all other days   Full instructions 1.25 mg on Wed, Fri; 2.5 mg all other days   Weekly total 15 mg   No change documented Elaine Lu RN   Plan last modified Elaine Lu RN (7/26/2016)   Next INR check 3/20/2018   Priority INR   Target end date     Indications   Long-term (current) use of anticoagulants [Z79.01] [Z79.01]  Coagulopathy (H) [D68.9]         Anticoagulation Episode Summary     INR check location     Preferred lab     Send INR reminders to [facility]    Comments       Anticoagulation Care Providers     Provider Role Specialty Phone number    Hortencia Marcus MD [facility] Internal Medicine [phone]            See the Encounter Report to view Anticoagulation Flowsheet and Dosing Calendar (Go to Encounters tab in chart review, and find the Anticoagulation Therapy Visit)    Dosage adjustment made based on physician directed care plan.    Elaine Lu RN

## 2018-02-27 NOTE — MR AVS SNAPSHOT
Renetta Reid   2/27/2018 3:45 PM   Anticoagulation Therapy Visit    Description:  47 year old female   Provider[facility]   Department:  [facility]           INR as of 2/27/2018     Today's INR 2.5      Anticoagulation Summary as of 2/27/2018     INR goal 2.0-3.0   Today's INR 2.5   Full instructions 1.25 mg on Wed, Fri; 2.5 mg all other days   Next INR check 3/20/2018    Indications   Long-term (current) use of anticoagulants [Z79.01] [Z79.01]  Coagulopathy (H) [D68.9]         Your next Anticoagulation Clinic appointment(s)     Mar 07, 2018  3:00 PM CST   [facility]    [address] [facility] [address]  [facility] [id number] [phone]            Mar 20, 2018 11:15 AM CDT   [facility]   [facility]    [address] [facility] [facility] [address]  [facility] [address] [phone]              Contact Numbers     [facility] Phone Numbers:  Anticoagulation Clinic Appointments : [phone]  Anticoagulation Nurse: [phone]         February 2018 Details    Sun Mon Tue Wed Thu Fri Sat         1               2               3                 4               5               6               7               8               9               10                 11               12               13               14               15               16               17                 18               19               20               21               22               23               24                 25               26               27      2.5 mg   See details      28      1.25 mg             Date Details   02/27 This INR check               How to take your warfarin dose     To take:  1.25 mg Take 0.5 of a 2.5 mg tablet.    To take:  2.5 mg Take 1 of the 2.5 mg tablets.           March 2018 Details    Sun Mon  Tue Wed Thu Fri Sat         1      2.5 mg         2      1.25 mg         3      2.5 mg           4      2.5 mg         5      2.5 mg         6      2.5 mg         7      1.25 mg         8      2.5 mg         9      1.25 mg         10      2.5 mg           11      2.5 mg         12      2.5 mg         13      2.5 mg         14      1.25 mg         15      2.5 mg         16      1.25 mg         17      2.5 mg           18      2.5 mg         19      2.5 mg         20            21               22               23               24                 25               26               27               28               29               30               31                Date Details   No additional details    Date of next INR:  3/20/2018         How to take your warfarin dose     To take:  1.25 mg Take 0.5 of a 2.5 mg tablet.    To take:  2.5 mg Take 1 of the 2.5 mg tablets.

## 2018-03-07 ENCOUNTER — ANTICOAGULATION THERAPY VISIT (OUTPATIENT)
Dept: ANTICOAGULATION | Facility: CLINIC | Age: 48
End: 2018-03-07
Payer: COMMERCIAL

## 2018-03-07 DIAGNOSIS — Z79.01 LONG-TERM (CURRENT) USE OF ANTICOAGULANTS: ICD-10-CM

## 2018-03-07 DIAGNOSIS — D68.9 COAGULOPATHY (H): ICD-10-CM

## 2018-03-07 LAB — INR POINT OF CARE: 2.1 (ref 0.86–1.14)

## 2018-03-07 PROCEDURE — 36416 COLLJ CAPILLARY BLOOD SPEC: CPT

## 2018-03-07 PROCEDURE — 99207 ZZC NO CHARGE NURSE ONLY: CPT

## 2018-03-07 PROCEDURE — 85610 PROTHROMBIN TIME: CPT | Mod: QW

## 2018-03-07 NOTE — MR AVS SNAPSHOT
Renetta Reid   3/7/2018 3:00 PM   Anticoagulation Therapy Visit    Description:  47 year old female   Provider[facility]   Department:  [facility]           INR as of 3/7/2018     Today's INR 2.1      Anticoagulation Summary as of 3/7/2018     INR goal 2.0-3.0   Today's INR 2.1   Full instructions 1.25 mg on Wed, Fri; 2.5 mg all other days   Next INR check 4/18/2018    Indications   Long-term (current) use of anticoagulants [Z79.01] [Z79.01]  Coagulopathy (H) [D68.9]         Your next Anticoagulation Clinic appointment(s)     Mar 20, 2018 11:15 AM CDT   [facility]   [facility]    [address] [facility] [address]  [facility] [id number] [phone]            Apr 18, 2018  3:00 PM CDT   Antico[facility] Visit with [facility]   [facility]    [address] [facility] [address]  [facility] [address] [phone]              Contact Numbers     [facility] Phone Numbers:  Anticoagulation Clinic Appointments : [phone]  Anticoagulation Nurse: [phone]         March 2018 Details    Sun Mon Tue Wed Thu Fri Sat         1               2               3                 4               5               6               7      1.25 mg   See details      8      2.5 mg         9      1.25 mg         10      2.5 mg           11      2.5 mg         12      2.5 mg         13      2.5 mg         14      1.25 mg         15      2.5 mg         16      1.25 mg         17      2.5 mg           18      2.5 mg         19      2.5 mg         20      2.5 mg         21      1.25 mg         22      2.5 mg         23      1.25 mg         24      2.5 mg           25      2.5 mg         26      2.5 mg         27      2.5 mg         28      1.25 mg         29      2.5 mg         30      1.25 mg         31      2.5 mg          Date Details   03/07 This INR check                How to take your warfarin dose     To take:  1.25 mg Take 0.5 of a 2.5 mg tablet.    To take:  2.5 mg Take 1 of the 2.5 mg tablets.           April 2018 Details    Sun Mon Tue Wed Thu Fri Sat     1      2.5 mg         2      2.5 mg         3      2.5 mg         4      1.25 mg         5      2.5 mg         6      1.25 mg         7      2.5 mg           8      2.5 mg         9      2.5 mg         10      2.5 mg         11      1.25 mg         12      2.5 mg         13      1.25 mg         14      2.5 mg           15      2.5 mg         16      2.5 mg         17      2.5 mg         18            19               20               21                 22               23               24               25               26               27               28                 29               30                     Date Details   No additional details    Date of next INR:  4/18/2018         How to take your warfarin dose     To take:  1.25 mg Take 0.5 of a 2.5 mg tablet.    To take:  2.5 mg Take 1 of the 2.5 mg tablets.

## 2018-03-07 NOTE — PROGRESS NOTES
ANTICOAGULATION FOLLOW-UP CLINIC VISIT    Patient Name:  Renetta Reid  Date:  3/7/2018  Contact Type:  Face to Face    SUBJECTIVE:     Patient Findings     Positives No Problem Findings           OBJECTIVE    INR Protime   Date Value Ref Range Status   03/07/2018 2.1 (A) 0.86 - 1.14 Final     Chromogenic Factor 10   Date Value Ref Range Status   08/05/2010 60 60 - 140 % Final     Comment:     Therapeutic Range: A Chromogenic Factor 10 level of 20-40% inversely   correlates   with an INR of 2-3 for patients receiving Warfarin. Chromogenic Factor 10   levels below 20% indicate an INR greater than 3, and levels above 40%   indicate   an INR lessthan 2.       ASSESSMENT / PLAN  INR assessment THER    Recheck INR In: 6 WEEKS    INR Location Clinic      Anticoagulation Summary as of 3/7/2018     INR goal 2.0-3.0   Today's INR 2.1   Maintenance plan 1.25 mg (2.5 mg x 0.5) on Wed, Fri; 2.5 mg (2.5 mg x 1) all other days   Full instructions 1.25 mg on Wed, Fri; 2.5 mg all other days   Weekly total 15 mg   No change documented Jennifer Matias RN   Plan last modified Elaine Lu RN (7/26/2016)   Next INR check 4/18/2018   Priority INR   Target end date     Indications   Long-term (current) use of anticoagulants [Z79.01] [Z79.01]  Coagulopathy (H) [D68.9]         Anticoagulation Episode Summary     INR check location     Preferred lab     Send INR reminders to [facility]    Comments       Anticoagulation Care Providers     Provider Role Specialty Phone number    Hortencia Marcus MD [facility] Internal Medicine [phone]            See the Encounter Report to view Anticoagulation Flowsheet and Dosing Calendar (Go to Encounters tab in chart review, and find the Anticoagulation Therapy Visit)    Dosage adjustment made based on physician directed care plan.    Jennifer Matias RN

## 2018-03-14 DIAGNOSIS — K21.9 GASTROESOPHAGEAL REFLUX DISEASE WITHOUT ESOPHAGITIS: ICD-10-CM

## 2018-03-19 NOTE — TELEPHONE ENCOUNTER
"Requested Prescriptions   Pending Prescriptions Disp Refills     omeprazole (PRILOSEC) 20 MG CR capsule [Pharmacy Med Name: OMEPRAZOLE DR 20 MG CAPSULE] 90 capsule 3     Sig: TAKE 1 CAPSULE (20 MG) BY MOUTH DAILY    PPI Protocol Passed    3/14/2018  2:00 AM       Passed - Not on Clopidogrel (unless Pantoprazole ordered)       Passed - No diagnosis of osteoporosis on record       Passed - Recent (12 mo) or future (30 days) visit within the authorizing provider's specialty    Patient had office visit in the last 12 months or has a visit in the next 30 days with authorizing provider or within the authorizing provider's specialty.  See \"Patient Info\" tab in inbasket, or \"Choose Columns\" in Meds & Orders section of the refill encounter.           Passed - Patient is age 18 or older       Passed - No active pregnacy on record       Passed - No positive pregnancy test in past 12 months          Prescription approved per [facility] [place].    "

## 2018-03-20 ENCOUNTER — ANTICOAGULATION THERAPY VISIT (OUTPATIENT)
Dept: ANTICOAGULATION | Facility: CLINIC | Age: 48
End: 2018-03-20
Payer: COMMERCIAL

## 2018-03-20 DIAGNOSIS — D68.9 COAGULOPATHY (H): ICD-10-CM

## 2018-03-20 DIAGNOSIS — Z79.01 LONG-TERM (CURRENT) USE OF ANTICOAGULANTS: ICD-10-CM

## 2018-03-20 LAB — INR POINT OF CARE: 2.1 (ref 0.86–1.14)

## 2018-03-20 PROCEDURE — 99207 ZZC NO CHARGE NURSE ONLY: CPT

## 2018-03-20 PROCEDURE — 85610 PROTHROMBIN TIME: CPT | Mod: QW

## 2018-03-20 PROCEDURE — 36416 COLLJ CAPILLARY BLOOD SPEC: CPT

## 2018-03-20 NOTE — MR AVS SNAPSHOT
Renetta SANTOYO Franklin   3/20/2018 11:15 AM   Anticoagulation Therapy Visit    Description:  47 year old female   [facility]   Department:  [facility]           INR as of 3/20/2018     Today's INR 2.1      Anticoagulation Summary as of 3/20/2018     INR goal 2.0-3.0   Today's INR 2.1   Full instructions 1.25 mg on Wed, Fri; 2.5 mg all other days   Next INR check 5/1/2018    Indications   Long-term (current) use of anticoagulants [Z79.01] [Z79.01]  Coagulopathy (H) [D68.9]         Your next Anticoagulation Clinic appointment(s)     May 01, 2018  3:15 PM CDT   [facility]tico[facility] with [facility]   [facility] ([facility])    [address] [facility] [address]  [facility] [address] [phone]              Contact Numbers     [facility] Phone Numbers:  Anticoagulation Clinic Appointments : [phone]  Anticoagulation Nurse: [phone]         March 2018 Details    Sun Mon Tue Wed Thu Fri Sat         1               2               3                 4               5               6               7               8               9               10                 11               12               13               14               15               16               17                 18               19               20      2.5 mg   See details      21      1.25 mg         22      2.5 mg         23      1.25 mg         24      2.5 mg           25      2.5 mg         26      2.5 mg         27      2.5 mg         28      1.25 mg         29      2.5 mg         30      1.25 mg         31      2.5 mg          Date Details   03/20 This INR check               How to take your warfarin dose     To take:  1.25 mg Take 0.5 of a 2.5 mg tablet.    To take:  2.5 mg Take 1 of the 2.5 mg tablets.           April 2018 Details    Sun Mon Tue Wed Thu Fri Sat     1      2.5 mg         2      2.5 mg         3      2.5 mg         4      1.25 mg         5       2.5 mg         6      1.25 mg         7      2.5 mg           8      2.5 mg         9      2.5 mg         10      2.5 mg         11      1.25 mg         12      2.5 mg         13      1.25 mg         14      2.5 mg           15      2.5 mg         16      2.5 mg         17      2.5 mg         18      1.25 mg         19      2.5 mg         20      1.25 mg         21      2.5 mg           22      2.5 mg         23      2.5 mg         24      2.5 mg         25      1.25 mg         26      2.5 mg         27      1.25 mg         28      2.5 mg           29      2.5 mg         30      2.5 mg               Date Details   No additional details            How to take your warfarin dose     To take:  1.25 mg Take 0.5 of a 2.5 mg tablet.    To take:  2.5 mg Take 1 of the 2.5 mg tablets.           May 2018 Details    Sun Mon Tue Wed Thu Fri Sat       1            2               3               4               5                 6               7               8               9               10               11               12                 13               14               15               16               17               18               19                 20               21               22               23               24               25               26                 27               28               29               30               31                  Date Details   No additional details    Date of next INR:  5/1/2018         How to take your warfarin dose     To take:  2.5 mg Take 1 of the 2.5 mg tablets.

## 2018-03-20 NOTE — PROGRESS NOTES
ANTICOAGULATION FOLLOW-UP CLINIC VISIT    Patient Name:  Renetta Reid  Date:  3/20/2018  Contact Type:  Face to Face    SUBJECTIVE:     Patient Findings     Positives No Problem Findings           OBJECTIVE    INR Protime   Date Value Ref Range Status   03/20/2018 2.1 (A) 0.86 - 1.14 Final     Chromogenic Factor 10   Date Value Ref Range Status   08/05/2010 60 60 - 140 % Final     Comment:     Therapeutic Range: A Chromogenic Factor 10 level of 20-40% inversely   correlates   with an INR of 2-3 for patients receiving Warfarin. Chromogenic Factor 10   levels below 20% indicate an INR greater than 3, and levels above 40%   indicate   an INR lessthan 2.       ASSESSMENT / PLAN  INR assessment THER    Recheck INR In: 6 WEEKS    INR Location Clinic      Anticoagulation Summary as of 3/20/2018     INR goal 2.0-3.0   Today's INR 2.1   Maintenance plan 1.25 mg (2.5 mg x 0.5) on Wed, Fri; 2.5 mg (2.5 mg x 1) all other days   Full instructions 1.25 mg on Wed, Fri; 2.5 mg all other days   Weekly total 15 mg   No change documented Elaine Lu RN   Plan last modified Elaine Lu RN (7/26/2016)   Next INR check 5/1/2018   Priority INR   Target end date     Indications   Long-term (current) use of anticoagulants [Z79.01] [Z79.01]  Coagulopathy (H) [D68.9]         Anticoagulation Episode Summary     INR check location     Preferred lab     Send INR reminders to [facility]    Comments       Anticoagulation Care Providers     Provider Role Specialty Phone number    Hortencia Marcus MD [facility] Internal Medicine [phone]            See the Encounter Report to view Anticoagulation Flowsheet and Dosing Calendar (Go to Encounters tab in chart review, and find the Anticoagulation Therapy Visit)    Dosage adjustment made based on physician directed care plan.    Elaine Lu RN

## 2018-05-01 ENCOUNTER — ANTICOAGULATION THERAPY VISIT (OUTPATIENT)
Dept: ANTICOAGULATION | Facility: CLINIC | Age: 48
End: 2018-05-01
Payer: COMMERCIAL

## 2018-05-01 DIAGNOSIS — Z79.01 LONG-TERM (CURRENT) USE OF ANTICOAGULANTS: ICD-10-CM

## 2018-05-01 DIAGNOSIS — D68.9 COAGULOPATHY (H): ICD-10-CM

## 2018-05-01 LAB — INR POINT OF CARE: 2.3 (ref 0.86–1.14)

## 2018-05-01 PROCEDURE — 99207 ZZC NO CHARGE NURSE ONLY: CPT

## 2018-05-01 PROCEDURE — 85610 PROTHROMBIN TIME: CPT | Mod: QW

## 2018-05-01 PROCEDURE — 36416 COLLJ CAPILLARY BLOOD SPEC: CPT

## 2018-05-01 NOTE — MR AVS SNAPSHOT
Renetta NATANAEL Reid   5/1/2018 3:15 PM   Anticoagulation Therapy Visit    Description:  47 year old female   Provider[facility]  [facility]   Department:  [facility]           INR as of 5/1/2018     Today's INR 2.3      Anticoagulation Summary as of 5/1/2018     INR goal 2.0-3.0   Today's INR 2.3   Full instructions 1.25 mg on Wed, Fri; 2.5 mg all other days   Next INR check 6/12/2018    Indications   Long-term (current) use of anticoagulants [Z79.01] [Z79.01]  Coagulopathy (H) [D68.9]         Your next Anticoagulation Clinic appointment(s)     Jun 12, 2018 11:00 AM CDT   [facility]    [address] [facility] [address]  [facility] [address] [phone]              Contact Numbers     [facility] Phone Numbers:  Anticoagulation Clinic Appointments : [phone]  Anticoagulation Nurse: [phone]         May 2018 Details    Sun Mon Tue Wed Thu Fri Sat       1      2.5 mg   See details      2      1.25 mg         3      2.5 mg         4      1.25 mg         5      2.5 mg           6      2.5 mg         7      2.5 mg         8      2.5 mg         9      1.25 mg         10      2.5 mg         11      1.25 mg         12      2.5 mg           13      2.5 mg         14      2.5 mg         15      2.5 mg         16      1.25 mg         17      2.5 mg         18      1.25 mg         19      2.5 mg           20      2.5 mg         21      2.5 mg         22      2.5 mg         23      1.25 mg         24      2.5 mg         25      1.25 mg         26      2.5 mg           27      2.5 mg         28      2.5 mg         29      2.5 mg         30      1.25 mg         31      2.5 mg            Date Details   05/01 This INR check               How to take your warfarin dose     To take:  1.25 mg Take 0.5 of a 2.5 mg tablet.    To take:  2.5 mg Take 1 of the 2.5 mg tablets.           June 2018 Details    Sun Mon Tue  Wed Thu Fri Sat          1      1.25 mg         2      2.5 mg           3      2.5 mg         4      2.5 mg         5      2.5 mg         6      1.25 mg         7      2.5 mg         8      1.25 mg         9      2.5 mg           10      2.5 mg         11      2.5 mg         12            13               14               15               16                 17               18               19               20               21               22               23                 24               25               26               27               28               29               30                Date Details   No additional details    Date of next INR:  6/12/2018         How to take your warfarin dose     To take:  1.25 mg Take 0.5 of a 2.5 mg tablet.    To take:  2.5 mg Take 1 of the 2.5 mg tablets.

## 2018-05-01 NOTE — PROGRESS NOTES
ANTICOAGULATION FOLLOW-UP CLINIC VISIT    Patient Name:  Renetta Reid  Date:  5/1/2018  Contact Type:  Face to Face    SUBJECTIVE:     Patient Findings     Positives No Problem Findings           OBJECTIVE    INR Protime   Date Value Ref Range Status   05/01/2018 2.3 (A) 0.86 - 1.14 Final     Chromogenic Factor 10   Date Value Ref Range Status   08/05/2010 60 60 - 140 % Final     Comment:     Therapeutic Range: A Chromogenic Factor 10 level of 20-40% inversely   correlates   with an INR of 2-3 for patients receiving Warfarin. Chromogenic Factor 10   levels below 20% indicate an INR greater than 3, and levels above 40%   indicate   an INR lessthan 2.       ASSESSMENT / PLAN  INR assessment THER    Recheck INR In: 6 WEEKS    INR Location Clinic      Anticoagulation Summary as of 5/1/2018     INR goal 2.0-3.0   Today's INR 2.3   Maintenance plan 1.25 mg (2.5 mg x 0.5) on Wed, Fri; 2.5 mg (2.5 mg x 1) all other days   Full instructions 1.25 mg on Wed, Fri; 2.5 mg all other days   Weekly total 15 mg   No change documented Elaine Lu RN   Plan last modified Elaine Lu RN (7/26/2016)   Next INR check 6/12/2018   Priority INR   Target end date     Indications   Long-term (current) use of anticoagulants [Z79.01] [Z79.01]  Coagulopathy (H) [D68.9]         Anticoagulation Episode Summary     INR check location     Preferred lab     Send INR reminders to [facility]    Comments       Anticoagulation Care Providers     Provider Role Specialty Phone number    Hortencia Marcus MD [facility] Internal Medicine [phone]            See the Encounter Report to view Anticoagulation Flowsheet and Dosing Calendar (Go to Encounters tab in chart review, and find the Anticoagulation Therapy Visit)    Dosage adjustment made based on physician directed care plan.    Elaine Lu RN

## 2018-05-07 DIAGNOSIS — D68.9 COAGULOPATHY (H): ICD-10-CM

## 2018-05-08 RX ORDER — WARFARIN SODIUM 2.5 MG/1
TABLET ORAL
Qty: 75 TABLET | Refills: 1 | Status: SHIPPED | OUTPATIENT
Start: 2018-05-08 | End: 2018-11-29

## 2018-07-23 ENCOUNTER — ANTICOAGULATION THERAPY VISIT (OUTPATIENT)
Dept: ANTICOAGULATION | Facility: CLINIC | Age: 48
End: 2018-07-23
Payer: COMMERCIAL

## 2018-07-23 DIAGNOSIS — D68.9 COAGULOPATHY (H): ICD-10-CM

## 2018-07-23 DIAGNOSIS — Z79.01 LONG-TERM (CURRENT) USE OF ANTICOAGULANTS: ICD-10-CM

## 2018-07-23 LAB — INR POINT OF CARE: 2 (ref 0.86–1.14)

## 2018-07-23 PROCEDURE — 36416 COLLJ CAPILLARY BLOOD SPEC: CPT

## 2018-07-23 PROCEDURE — 85610 PROTHROMBIN TIME: CPT | Mod: QW

## 2018-07-23 PROCEDURE — 99207 ZZC NO CHARGE NURSE ONLY: CPT

## 2018-07-23 NOTE — PROGRESS NOTES
ANTICOAGULATION FOLLOW-UP CLINIC VISIT    Patient Name:  Renetta Reid  Date:  7/23/2018  Contact Type:  Face to Face    SUBJECTIVE:     Patient Findings     Positives No Problem Findings           OBJECTIVE    INR Protime   Date Value Ref Range Status   07/23/2018 2.0 (A) 0.86 - 1.14 Final     Chromogenic Factor 10   Date Value Ref Range Status   08/05/2010 60 60 - 140 % Final     Comment:     Therapeutic Range: A Chromogenic Factor 10 level of 20-40% inversely   correlates   with an INR of 2-3 for patients receiving Warfarin. Chromogenic Factor 10   levels below 20% indicate an INR greater than 3, and levels above 40%   indicate   an INR lessthan 2.       ASSESSMENT / PLAN  INR assessment THER    Recheck INR In: 6 WEEKS    INR Location Clinic      Anticoagulation Summary as of 7/23/2018     INR goal 2.0-3.0   Today's INR 2.0   Warfarin maintenance plan 1.25 mg (2.5 mg x 0.5) on Wed, Fri; 2.5 mg (2.5 mg x 1) all other days   Full warfarin instructions 1.25 mg on Wed, Fri; 2.5 mg all other days   Weekly warfarin total 15 mg   No change documented Elaine Lu RN   Plan last modified Elaine Lu RN (7/26/2016)   Next INR check 9/4/2018   Priority INR   Target end date     Indications   Long-term (current) use of anticoagulants [Z79.01] [Z79.01]  Coagulopathy (H) [D68.9]         Anticoagulation Episode Summary     INR check location     Preferred lab     Send INR reminders to [facility]    Comments       Anticoagulation Care Providers     Provider Role Specialty Phone number    Hortencia Marcus MD [facility] Internal Medicine [phone]            See the Encounter Report to view Anticoagulation Flowsheet and Dosing Calendar (Go to Encounters tab in chart review, and find the Anticoagulation Therapy Visit)    Dosage adjustment made based on physician directed care plan.    Elaine Lu RN

## 2018-07-23 NOTE — MR AVS SNAPSHOT
Renetta NATANAEL Reid   7/23/2018 4:15 PM   Anticoagulation Therapy Visit    Description:  47 year old female   Provider[facility]  [facility]   Department:  [facility]           INR as of 7/23/2018     Today's INR 2.0      Anticoagulation Summary as of 7/23/2018     INR goal 2.0-3.0   Today's INR 2.0   Full warfarin instructions 1.25 mg on Wed, Fri; 2.5 mg all other days   Next INR check 9/4/2018    Indications   Long-term (current) use of anticoagulants [Z79.01] [Z79.01]  Coagulopathy (H) [D68.9]         Your next Anticoagulation Clinic appointment(s)     Sep 04, 2018  3:00 PM CDT   [facility] Visit with [facility]   [facility] ([facility]    [address] [facility] [address]  [facility] [address] [phone]              Contact Numbers     [facility] Phone Numbers:  Anticoagulation Clinic Appointments : [phone]  Anticoagulation Nurse: [phone]         July 2018 Details    Sun Mon Tue Wed Thu Fri Sat     1               2               3               4               5               6               7                 8               9               10               11               12               13               14                 15               16               17               18               19               20               21                 22               23      2.5 mg   See details      24      2.5 mg         25      1.25 mg         26      2.5 mg         27      1.25 mg         28      2.5 mg           29      2.5 mg         30      2.5 mg         31      2.5 mg              Date Details   07/23 This INR check               How to take your warfarin dose     To take:  1.25 mg Take 0.5 of a 2.5 mg tablet.    To take:  2.5 mg Take 1 of the 2.5 mg tablets.           August 2018 Details    Sun Mon Tue Wed Thu Fri Sat        1      1.25 mg         2      2.5 mg         3      1.25 mg         4      2.5 mg           5       2.5 mg         6      2.5 mg         7      2.5 mg         8      1.25 mg         9      2.5 mg         10      1.25 mg         11      2.5 mg           12      2.5 mg         13      2.5 mg         14      2.5 mg         15      1.25 mg         16      2.5 mg         17      1.25 mg         18      2.5 mg           19      2.5 mg         20      2.5 mg         21      2.5 mg         22      1.25 mg         23      2.5 mg         24      1.25 mg         25      2.5 mg           26      2.5 mg         27      2.5 mg         28      2.5 mg         29      1.25 mg         30      2.5 mg         31      1.25 mg           Date Details   No additional details            How to take your warfarin dose     To take:  1.25 mg Take 0.5 of a 2.5 mg tablet.    To take:  2.5 mg Take 1 of the 2.5 mg tablets.           September 2018 Details    Sun Mon Tue Wed Thu Fri Sat           1      2.5 mg           2      2.5 mg         3      2.5 mg         4            5               6               7               8                 9               10               11               12               13               14               15                 16               17               18               19               20               21               22                 23               24               25               26               27               28               29                 30                      Date Details   No additional details    Date of next INR:  9/4/2018         How to take your warfarin dose     To take:  2.5 mg Take 1 of the 2.5 mg tablets.

## 2018-11-06 ENCOUNTER — ANTICOAGULATION THERAPY VISIT (OUTPATIENT)
Dept: ANTICOAGULATION | Facility: CLINIC | Age: 48
End: 2018-11-06
Payer: COMMERCIAL

## 2018-11-06 DIAGNOSIS — D68.9 COAGULOPATHY (H): ICD-10-CM

## 2018-11-06 LAB — INR POINT OF CARE: 2.4 (ref 0.86–1.14)

## 2018-11-06 PROCEDURE — 99207 ZZC NO CHARGE NURSE ONLY: CPT

## 2018-11-06 PROCEDURE — 36416 COLLJ CAPILLARY BLOOD SPEC: CPT

## 2018-11-06 PROCEDURE — 85610 PROTHROMBIN TIME: CPT | Mod: QW

## 2018-11-06 NOTE — MR AVS SNAPSHOT
Renetta SANTOYO Franklin   11/6/2018 10:30 AM   Anticoagulation Therapy Visit    Description:  48 year old female   Provider[facility]   Department:  [facility]           INR as of 11/6/2018     Today's INR 2.4      Anticoagulation Summary as of 11/6/2018     INR goal 2.0-3.0   Today's INR 2.4   Full warfarin instructions 1.25 mg on Wed, Fri; 2.5 mg all other days   Next INR check 12/17/2018    Indications   Long-term (current) use of anticoagulants [Z79.01] [Z79.01]  Coagulopathy (H) [D68.9]         Your next Anticoagulation Clinic appointment(s)     Dec 17, 2018  2:00 PM CST   Anticoagulation Visit with [facility]   [facility]    [address] [facility] [address]  [facility] [address] [phone]              Contact Numbers     [facility] Phone Numbers:  Anticoagulation Clinic Appointments : [phone]  Anticoagulation Nurse: [phone]         November 2018 Details    Sun Mon Tue Wed Thu Fri Sat         1               2               3                 4               5               6      2.5 mg   See details      7      1.25 mg         8      2.5 mg         9      1.25 mg         10      2.5 mg           11      2.5 mg         12      2.5 mg         13      2.5 mg         14      1.25 mg         15      2.5 mg         16      1.25 mg         17      2.5 mg           18      2.5 mg         19      2.5 mg         20      2.5 mg         21      1.25 mg         22      2.5 mg         23      1.25 mg         24      2.5 mg           25      2.5 mg         26      2.5 mg         27      2.5 mg         28      1.25 mg         29      2.5 mg         30      1.25 mg           Date Details   11/06 This INR check               How to take your warfarin dose     To take:  1.25 mg Take 0.5 of a 2.5 mg tablet.    To take:  2.5 mg Take 1 of the 2.5 mg tablets.           December 2018 Details    Sun Mon Tue Wed Thu Fri Sat           1       2.5 mg           2      2.5 mg         3      2.5 mg         4      2.5 mg         5      1.25 mg         6      2.5 mg         7      1.25 mg         8      2.5 mg           9      2.5 mg         10      2.5 mg         11      2.5 mg         12      1.25 mg         13      2.5 mg         14      1.25 mg         15      2.5 mg           16      2.5 mg         17            18               19               20               21               22                 23               24               25               26               27               28               29                 30               31                     Date Details   No additional details    Date of next INR:  12/17/2018         How to take your warfarin dose     To take:  1.25 mg Take 0.5 of a 2.5 mg tablet.    To take:  2.5 mg Take 1 of the 2.5 mg tablets.

## 2018-11-06 NOTE — PROGRESS NOTES
ANTICOAGULATION FOLLOW-UP CLINIC VISIT    Patient Name:  Renetta Reid  Date:  11/6/2018  Contact Type:  Face to Face    SUBJECTIVE:     Patient Findings     Positives No Problem Findings    Comments Pt denies any changes or missed warfarin doses since last INR visit. Pt denies any bleeding or bruising problems.              OBJECTIVE    INR Protime   Date Value Ref Range Status   11/06/2018 2.4 (A) 0.86 - 1.14 Final     Chromogenic Factor 10   Date Value Ref Range Status   08/05/2010 60 60 - 140 % Final     Comment:     Therapeutic Range: A Chromogenic Factor 10 level of 20-40% inversely   correlates   with an INR of 2-3 for patients receiving Warfarin. Chromogenic Factor 10   levels below 20% indicate an INR greater than 3, and levels above 40%   indicate   an INR lessthan 2.       ASSESSMENT / PLAN  INR assessment THER    Recheck INR In: 6 WEEKS    INR Location Clinic      Anticoagulation Summary as of 11/6/2018     INR goal 2.0-3.0   Today's INR 2.4   Warfarin maintenance plan 1.25 mg (2.5 mg x 0.5) on Wed, Fri; 2.5 mg (2.5 mg x 1) all other days   Full warfarin instructions 1.25 mg on Wed, Fri; 2.5 mg all other days   Weekly warfarin total 15 mg   No change documented Elaine Lu, RN   Plan last modified Elaine Lu, RN (7/26/2016)   Next INR check 12/17/2018   Priority INR   Target end date     Indications   Long-term (current) use of anticoagulants [Z79.01] [Z79.01]  Coagulopathy (H) [D68.9]         Anticoagulation Episode Summary     INR check location     Preferred lab     Send INR reminders to [facility]    Comments       Anticoagulation Care Providers     Provider Role Specialty Phone number    Hortencia Marcus MD Responsible Internal Medicine [phone]            See the Encounter Report to view Anticoagulation Flowsheet and Dosing Calendar (Go to Encounters tab in chart review, and find the Anticoagulation Therapy Visit)    Dosage adjustment made based on physician directed care plan.    Elaine LUCIA  Aly RN

## 2018-11-13 ENCOUNTER — TELEPHONE (OUTPATIENT)
Dept: ANTICOAGULATION | Facility: CLINIC | Age: 48
End: 2018-11-13

## 2018-11-13 DIAGNOSIS — Z79.01 LONG TERM CURRENT USE OF ANTICOAGULANTS WITH INR GOAL OF 2.0-3.0: Primary | ICD-10-CM

## 2018-11-13 DIAGNOSIS — D68.9 COAGULOPATHY (H): ICD-10-CM

## 2018-11-14 NOTE — TELEPHONE ENCOUNTER
For insurance purposes, an annual INR referral is required. Please complete and sign the order then route back to [facility]. Elaine Lu RN

## 2018-11-29 DIAGNOSIS — D68.9 COAGULOPATHY (H): ICD-10-CM

## 2018-11-29 RX ORDER — WARFARIN SODIUM 2.5 MG/1
TABLET ORAL
Qty: 75 TABLET | Refills: 0 | Status: SHIPPED | OUTPATIENT
Start: 2018-11-29 | End: 2019-02-23

## 2018-11-29 NOTE — TELEPHONE ENCOUNTER
Prescription approved per [facility] Refill Protocol.    Elaine Lu RN

## 2018-11-29 NOTE — TELEPHONE ENCOUNTER
"Requested Prescriptions   Pending Prescriptions Disp Refills     warfarin (COUMADIN) 2.5 MG tablet [Pharmacy Med Name: WARFARIN SODIUM 2.5 MG TABLET] 75 tablet 1    Last Written Prescription Date:  2028  Last Fill Quantity: 75,  # refills: 1   Last office visit: 2018 with prescribing provider:     Future Office Visit:   Si TAB DAILY ON SUN,MON,TU,TH,& SAT AND 1/2 TAB ON WED & FRI OR AS INSTRUCTED BY INR RESULT    Vitamin K Antagonists Failed    2018 10:56 AM       Failed - INR is within goal in the past 6 weeks    Confirm INR is within goal in the past 6 weeks.     Recent Labs   Lab Test 18   INR  2.4*                      Passed - Recent (12 mo) or future (30 days) visit within the authorizing provider's specialty    Patient had office visit in the last 12 months or has a visit in the next 30 days with authorizing provider or within the authorizing provider's specialty.  See \"Patient Info\" tab in inbasket, or \"Choose Columns\" in Meds & Orders section of the refill encounter.             Passed - Patient is 18 years of age or older       Passed - Patient is not pregnant       Passed - No positive pregnancy on file in past 12 months        "

## 2018-12-09 DIAGNOSIS — K21.9 GASTROESOPHAGEAL REFLUX DISEASE WITHOUT ESOPHAGITIS: ICD-10-CM

## 2018-12-11 NOTE — TELEPHONE ENCOUNTER
"Requested Prescriptions   Pending Prescriptions Disp Refills     omeprazole (PRILOSEC) 20 MG DR capsule [Pharmacy Med Name: OMEPRAZOLE DR 20 MG CAPSULE]  Last Written Prescription Date:  3/19/2018  Last Fill Quantity: 90,  # refills: 2   Last office visit: 2/6/2018 with prescribing provider:     Future Office Visit:   90 capsule 2     Sig: TAKE 1 CAPSULE (20 MG) BY MOUTH DAILY    PPI Protocol Passed - 12/9/2018 12:01 PM       Passed - Not on Clopidogrel (unless Pantoprazole ordered)       Passed - No diagnosis of osteoporosis on record       Passed - Recent (12 mo) or future (30 days) visit within the authorizing provider's specialty    Patient had office visit in the last 12 months or has a visit in the next 30 days with authorizing provider or within the authorizing provider's specialty.  See \"Patient Info\" tab in inbasket, or \"Choose Columns\" in Meds & Orders section of the refill encounter.             Passed - Patient is age 18 or older       Passed - No active pregnacy on record       Passed - No positive pregnancy test in past 12 months        "

## 2019-02-23 DIAGNOSIS — D68.9 COAGULOPATHY (H): ICD-10-CM

## 2019-02-25 NOTE — TELEPHONE ENCOUNTER
"Requested Prescriptions   Pending Prescriptions Disp Refills     warfarin (COUMADIN) 2.5 MG tablet [Pharmacy Med Name: WARFARIN SODIUM 2.5 MG TABLET]  Last Written Prescription Date:  2018  Last Fill Quantity: 75,  # refills: 0   Last office visit: 2018 with prescribing provider:     Future Office Visit:   75 tablet 0     Si TAB DAILY ON SUN,MON,TU,TH,& SAT AND 1/2 TAB ON WED & FRI OR AS INSTRUCTED BY INR RESULT.    Vitamin K Antagonists Failed - 2019 12:21 AM       Failed - Recent (12 mo) or future (30 days) visit within the authorizing provider's specialty    Patient had office visit in the last 12 months or has a visit in the next 30 days with authorizing provider or within the authorizing provider's specialty.  See \"Patient Info\" tab in inbasket, or \"Choose Columns\" in Meds & Orders section of the refill encounter.             Failed - INR is within goal in the past 6 weeks    Confirm INR is within goal in the past 6 weeks.     Recent Labs   Lab Test 18   INR 2.4*                      Passed - Medication is active on med list       Passed - Patient is 18 years of age or older       Passed - Patient is not pregnant       Passed - No positive pregnancy on file in past 12 months        "

## 2019-02-27 RX ORDER — WARFARIN SODIUM 2.5 MG/1
TABLET ORAL
Qty: 24 TABLET | Refills: 0 | Status: SHIPPED | OUTPATIENT
Start: 2019-02-27 | End: 2019-03-26

## 2019-02-27 NOTE — TELEPHONE ENCOUNTER
Last INR was 11/6/18. Has rescheduled and no-showed a couple INR appts, next scheduled for INR 3/5/19  Medication is being filled for 1 time refill only due to:  Patient needs to be seen because it has been more than one year since last visit.   Elaine Lu RN

## 2019-03-05 ENCOUNTER — ANTICOAGULATION THERAPY VISIT (OUTPATIENT)
Dept: ANTICOAGULATION | Facility: CLINIC | Age: 49
End: 2019-03-05
Payer: COMMERCIAL

## 2019-03-05 DIAGNOSIS — Z79.01 LONG TERM CURRENT USE OF ANTICOAGULANTS WITH INR GOAL OF 2.0-3.0: ICD-10-CM

## 2019-03-05 DIAGNOSIS — D68.9 COAGULOPATHY (H): ICD-10-CM

## 2019-03-05 LAB — INR POINT OF CARE: 1.3 (ref 0.86–1.14)

## 2019-03-05 PROCEDURE — 36416 COLLJ CAPILLARY BLOOD SPEC: CPT

## 2019-03-05 PROCEDURE — 99207 ZZC NO CHARGE NURSE ONLY: CPT

## 2019-03-05 PROCEDURE — 85610 PROTHROMBIN TIME: CPT | Mod: QW

## 2019-03-05 NOTE — PROGRESS NOTES
ANTICOAGULATION FOLLOW-UP CLINIC VISIT    Patient Name:  Renetta Reid  Date:  3/5/2019  Contact Type:  Face to Face    SUBJECTIVE:     Patient Findings     Positives:   Unexplained INR or factor level change    Comments:   Pt was last in clinic for INR check 18. Pt denies any changes or missed warfarin doses since last INR visit. Pt denies any bleeding or bruising problems.              OBJECTIVE    INR Protime   Date Value Ref Range Status   2019 1.3 (A) 0.86 - 1.14 Final     Chromogenic Factor 10   Date Value Ref Range Status   2010 60 60 - 140 % Final     Comment:     Therapeutic Range: A Chromogenic Factor 10 level of 20-40% inversely   correlates   with an INR of 2-3 for patients receiving Warfarin. Chromogenic Factor 10   levels below 20% indicate an INR greater than 3, and levels above 40%   indicate   an INR lessthan 2.       ASSESSMENT / PLAN  INR assessment SUB    Recheck INR In: 2 WEEKS    INR Location Clinic      Anticoagulation Summary  As of 3/5/2019    INR goal:   2.0-3.0   TTR:   78.7 % (2.8 y)   INR used for dosin.3! (3/5/2019)   Warfarin maintenance plan:   1.25 mg (2.5 mg x 0.5) every Wed, Fri; 2.5 mg (2.5 mg x 1) all other days   Full warfarin instructions:   3/5: 5 mg; 3/6: 5 mg; Otherwise 1.25 mg every Wed, Fri; 2.5 mg all other days   Weekly warfarin total:   15 mg   Plan last modified:   Elaine Lu RN (2016)   Next INR check:   3/19/2019   Priority:   INR   Target end date:       Indications    Long term current use of anticoagulants with INR goal of 2.0-3.0 [Z79.01]  Coagulopathy (H) [D68.9]             Anticoagulation Episode Summary     INR check location:       Preferred lab:       Send INR reminders to:   [facility]    Comments:         Anticoagulation Care Providers     Provider Role Specialty Phone number    Hortencia Marcus MD [facility] Internal Medicine [phone]            See the Encounter Report to view Anticoagulation Flowsheet and Dosing Calendar  (Go to Encounters tab in chart review, and find the Anticoagulation Therapy Visit)    Dosage adjustment made based on physician directed care plan.    Elaine Lu RN

## 2019-03-14 DIAGNOSIS — K21.9 GASTROESOPHAGEAL REFLUX DISEASE WITHOUT ESOPHAGITIS: ICD-10-CM

## 2019-03-16 NOTE — TELEPHONE ENCOUNTER
Medication is being filled for 1 time refill only due to:  Patient needs to be seen because it has been more than one year since last visit.    Last office visit: 2/6/18    Next 5 appointments (look out 90 days)    Apr 01, 2019 10:40 AM CDT  PHYSICAL with Hortencia Marcus MD  [facility] ([facility]) [address]  [facility] [address]  [phone]

## 2019-03-19 ENCOUNTER — ANTICOAGULATION THERAPY VISIT (OUTPATIENT)
Dept: ANTICOAGULATION | Facility: CLINIC | Age: 49
End: 2019-03-19
Payer: COMMERCIAL

## 2019-03-19 DIAGNOSIS — Z79.01 LONG TERM CURRENT USE OF ANTICOAGULANTS WITH INR GOAL OF 2.0-3.0: ICD-10-CM

## 2019-03-19 DIAGNOSIS — D68.9 COAGULOPATHY (H): ICD-10-CM

## 2019-03-19 LAB — INR POINT OF CARE: 2.2 (ref 0.86–1.14)

## 2019-03-19 PROCEDURE — 99207 ZZC NO CHARGE NURSE ONLY: CPT

## 2019-03-19 PROCEDURE — 85610 PROTHROMBIN TIME: CPT | Mod: QW

## 2019-03-19 PROCEDURE — 36416 COLLJ CAPILLARY BLOOD SPEC: CPT

## 2019-03-19 NOTE — PROGRESS NOTES
ANTICOAGULATION FOLLOW-UP CLINIC VISIT    Patient Name:  Renetta Reid  Date:  3/19/2019  Contact Type:  Face to Face    SUBJECTIVE:     Patient Findings     Comments:   The patient was assessed for diet, medication, and activity level changes, missed or extra doses, bruising or bleeding, with no problem findings.  Pt denies any changes or missed warfarin doses since last INR visit. Pt denies any bleeding or bruising problems.              OBJECTIVE    INR Protime   Date Value Ref Range Status   2019 2.2 (A) 0.86 - 1.14 Final     Chromogenic Factor 10   Date Value Ref Range Status   2010 60 60 - 140 % Final     Comment:     Therapeutic Range: A Chromogenic Factor 10 level of 20-40% inversely   correlates   with an INR of 2-3 for patients receiving Warfarin. Chromogenic Factor 10   levels below 20% indicate an INR greater than 3, and levels above 40%   indicate   an INR lessthan 2.       ASSESSMENT / PLAN  INR assessment THER    Recheck INR In: 4 WEEKS    INR Location Clinic      Anticoagulation Summary  As of 3/19/2019    INR goal:   2.0-3.0   TTR:   77.9 % (2.8 y)   INR used for dosin.2 (3/19/2019)   Warfarin maintenance plan:   1.25 mg (2.5 mg x 0.5) every Wed, Fri; 2.5 mg (2.5 mg x 1) all other days   Full warfarin instructions:   1.25 mg every Wed, Fri; 2.5 mg all other days   Weekly warfarin total:   15 mg   No change documented:   Elaine Lu RN   Plan last modified:   Elaine Lu RN (2016)   Next INR check:   2019   Priority:   INR   Target end date:       Indications    Long term current use of anticoagulants with INR goal of 2.0-3.0 [Z79.01]  Coagulopathy (H) [D68.9]             Anticoagulation Episode Summary     INR check location:       Preferred lab:       Send INR reminders to:   [facility]    Comments:         Anticoagulation Care Providers     Provider Role Specialty Phone number    Hortencia Marcus MD [facility] Internal Medicine [phone]            See the  Encounter Report to view Anticoagulation Flowsheet and Dosing Calendar (Go to Encounters tab in chart review, and find the Anticoagulation Therapy Visit)    Dosage adjustment made based on physician directed care plan.    Elaine Lu RN

## 2019-03-26 DIAGNOSIS — D68.9 COAGULOPATHY (H): ICD-10-CM

## 2019-03-26 RX ORDER — WARFARIN SODIUM 2.5 MG/1
TABLET ORAL
Qty: 24 TABLET | Refills: 0 | Status: SHIPPED | OUTPATIENT
Start: 2019-03-26 | End: 2019-04-26

## 2019-03-26 NOTE — TELEPHONE ENCOUNTER
"Requested Prescriptions   Pending Prescriptions Disp Refills     warfarin (COUMADIN) 2.5 MG tablet 24 tablet 0     Si TAB BY MOUTH DAILY ON SUN,MON,TUES,THURS,& SAT AND 1/2 TAB ON WED & FRI OR AS INSTRUCTED BY INR RESULT.    Vitamin K Antagonists Failed - 3/26/2019  9:15 AM       Failed - INR is within goal in the past 6 weeks    Confirm INR is within goal in the past 6 weeks.     Recent Labs   Lab Test 19   INR 2.2*                      Passed - Recent (12 mo) or future (30 days) visit within the authorizing provider's specialty    Patient had office visit in the last 12 months or has a visit in the next 30 days with authorizing provider or within the authorizing provider's specialty.  See \"Patient Info\" tab in inbasket, or \"Choose Columns\" in Meds & Orders section of the refill encounter.             Passed - Medication is active on med list       Passed - Patient is 18 years of age or older       Passed - Patient is not pregnant       Passed - No positive pregnancy on file in past 12 months        Prescription approved per [facility] Refill Protocol.  Elaine Lu RN    "

## 2019-03-26 NOTE — TELEPHONE ENCOUNTER
Warfarin 2.5 mg      Last Written Prescription Date:  2/27/19  Last Fill Quantity: 24,   # refills: 0  Last Office Visit: 2/6/18  Future Office visit:    Next 5 appointments (look out 90 days)    Apr 01, 2019 10:40 AM CDT  PHYSICAL with Hortencia Marcus MD  [facility] ([facility]) [address]  [facility] [address]  [phone]

## 2019-04-10 DIAGNOSIS — K21.9 GASTROESOPHAGEAL REFLUX DISEASE WITHOUT ESOPHAGITIS: ICD-10-CM

## 2019-04-10 NOTE — TELEPHONE ENCOUNTER
"Requested Prescriptions   Pending Prescriptions Disp Refills     omeprazole (PRILOSEC) 20 MG DR capsule [Pharmacy Med Name: OMEPRAZOLE DR 20 MG CAPSULE] 30 capsule 0     Sig: TAKE 1 CAPSULE BY MOUTH EVERY DAY   Last Written Prescription Date:  03/16/2019  Last Fill Quantity: 30,  # refills: 0   Last office visit: 2/6/2018 with prescribing provider:     Future Office Visit:      PPI Protocol Passed - 4/10/2019 12:36 PM        Passed - Not on Clopidogrel (unless Pantoprazole ordered)        Passed - No diagnosis of osteoporosis on record        Passed - Recent (12 mo) or future (30 days) visit within the authorizing provider's specialty     Patient had office visit in the last 12 months or has a visit in the next 30 days with authorizing provider or within the authorizing provider's specialty.  See \"Patient Info\" tab in inbasket, or \"Choose Columns\" in Meds & Orders section of the refill encounter.              Passed - Medication is active on med list        Passed - Patient is age 18 or older        Passed - No active pregnacy on record        Passed - No positive pregnancy test in past 12 months        "

## 2019-04-11 NOTE — TELEPHONE ENCOUNTER
Done for one fill, call and advise patient she is overdue for appointment, last visit was 2/18 so she will need to come in before I can do any further refills.

## 2019-04-15 ENCOUNTER — MYC MEDICAL ADVICE (OUTPATIENT)
Dept: INTERNAL MEDICINE | Facility: CLINIC | Age: 49
End: 2019-04-15

## 2019-04-16 ENCOUNTER — ANTICOAGULATION THERAPY VISIT (OUTPATIENT)
Dept: ANTICOAGULATION | Facility: CLINIC | Age: 49
End: 2019-04-16
Payer: COMMERCIAL

## 2019-04-16 DIAGNOSIS — D68.9 COAGULOPATHY (H): ICD-10-CM

## 2019-04-16 DIAGNOSIS — Z79.01 LONG TERM CURRENT USE OF ANTICOAGULANTS WITH INR GOAL OF 2.0-3.0: ICD-10-CM

## 2019-04-16 DIAGNOSIS — L50.9 URTICARIA: ICD-10-CM

## 2019-04-16 LAB — INR POINT OF CARE: 2 (ref 0.86–1.14)

## 2019-04-16 PROCEDURE — 85610 PROTHROMBIN TIME: CPT | Mod: QW

## 2019-04-16 PROCEDURE — 99207 ZZC NO CHARGE NURSE ONLY: CPT

## 2019-04-16 PROCEDURE — 36416 COLLJ CAPILLARY BLOOD SPEC: CPT

## 2019-04-16 NOTE — TELEPHONE ENCOUNTER
"Requested Prescriptions   Pending Prescriptions Disp Refills     hydrOXYzine (ATARAX) 10 MG tablet [Pharmacy Med Name: HYDROXYZINE HCL   10 MG TABLET]  Last Written Prescription Date:  4/23/18  Last Fill Quantity: 90,  # refills: 3   Last office visit: 2/6/2018 with prescribing provider:  Amrit   Future Office Visit:   Next 5 appointments (look out 90 days)    May 16, 2019  9:20 AM CDT  PHYSICAL with Hortencia Marcus MD  [facility] ([facility]) [address]  [facility] [address]  [phone]          90 tablet 3     Sig: TAKE 1 TABLET (10 MG) BY MOUTH AT BEDTIME       Antihistamines Protocol Passed - 4/16/2019 10:34 AM        Passed - Recent (12 mo) or future (30 days) visit within the authorizing provider's specialty     Patient had office visit in the last 12 months or has a visit in the next 30 days with authorizing provider or within the authorizing provider's specialty.  See \"Patient Info\" tab in inbasket, or \"Choose Columns\" in Meds & Orders section of the refill encounter.              Passed - Patient is age 3 or older     Apply age and/or weight-based dosing for peds patients age 3 and older.    Forward request to provider for patients under the age of 3.          Passed - Medication is active on med list          "

## 2019-04-16 NOTE — PROGRESS NOTES
ANTICOAGULATION FOLLOW-UP CLINIC VISIT    Patient Name:  Renetta Reid  Date:  2019  Contact Type:  Face to Face    SUBJECTIVE:     Patient Findings     Comments:   The patient was assessed for diet, medication, and activity level changes, missed or extra doses, bruising or bleeding, with no problem findings.  Patient denies any changes or missed warfarin doses since last INR check. Patient denies any bleeding or bruising problems.              OBJECTIVE    INR Protime   Date Value Ref Range Status   2019 2.0 (A) 0.86 - 1.14 Final     Chromogenic Factor 10   Date Value Ref Range Status   2010 60 60 - 140 % Final     Comment:     Therapeutic Range: A Chromogenic Factor 10 level of 20-40% inversely   correlates   with an INR of 2-3 for patients receiving Warfarin. Chromogenic Factor 10   levels below 20% indicate an INR greater than 3, and levels above 40%   indicate   an INR lessthan 2.       ASSESSMENT / PLAN  INR assessment THER    Recheck INR In: 6 WEEKS    INR Location Clinic      Anticoagulation Summary  As of 2019    INR goal:   2.0-3.0   TTR:   78.5 % (2.9 y)   INR used for dosin.0 (2019)   Warfarin maintenance plan:   1.25 mg (2.5 mg x 0.5) every Wed, Fri; 2.5 mg (2.5 mg x 1) all other days   Full warfarin instructions:   1.25 mg every Wed, Fri; 2.5 mg all other days   Weekly warfarin total:   15 mg   No change documented:   Elaine Lu RN   Plan last modified:   Elaine Lu RN (2016)   Next INR check:   2019   Priority:   INR   Target end date:       Indications    Long term current use of anticoagulants with INR goal of 2.0-3.0 [Z79.01]  Coagulopathy (H) [D68.9]             Anticoagulation Episode Summary     INR check location:       Preferred lab:       Send INR reminders to:   [facility]    Comments:         Anticoagulation Care Providers     Provider Role Specialty Phone number    Hortencia Marcus MD [facility] Internal Medicine [phone]            See  the Encounter Report to view Anticoagulation Flowsheet and Dosing Calendar (Go to Encounters tab in chart review, and find the Anticoagulation Therapy Visit)    Dosage adjustment made based on physician directed care plan.    Elaine Lu RN

## 2019-04-18 RX ORDER — HYDROXYZINE HYDROCHLORIDE 10 MG/1
TABLET, FILM COATED ORAL
Qty: 30 TABLET | Refills: 0 | Status: SHIPPED | OUTPATIENT
Start: 2019-04-18 | End: 2019-05-15

## 2019-04-26 DIAGNOSIS — D68.9 COAGULOPATHY (H): ICD-10-CM

## 2019-04-26 RX ORDER — WARFARIN SODIUM 2.5 MG/1
TABLET ORAL
Qty: 24 TABLET | Refills: 0 | Status: SHIPPED | OUTPATIENT
Start: 2019-04-26 | End: 2019-05-24

## 2019-04-26 NOTE — TELEPHONE ENCOUNTER
"Requested Prescriptions   Pending Prescriptions Disp Refills     warfarin (COUMADIN) 2.5 MG tablet [Pharmacy Med Name: WARFARIN SODIUM 2.5 MG TABLET] 24 tablet 0     Sig: TAKE 1 TAB BY MOUTH DAILY ON SUN, MON, TUES, THURS, SAT AND 1/2 TAB ON WED AND FRI AS DIRECTED       Vitamin K Antagonists Failed - 4/26/2019  1:09 AM        Failed - INR is within goal in the past 6 weeks     Confirm INR is within goal in the past 6 weeks.     Recent Labs   Lab Test 04/16/19   INR 2.0*                       Passed - Recent (12 mo) or future (30 days) visit within the authorizing provider's specialty     Patient had office visit in the last 12 months or has a visit in the next 30 days with authorizing provider or within the authorizing provider's specialty.  See \"Patient Info\" tab in inbasket, or \"Choose Columns\" in Meds & Orders section of the refill encounter.              Passed - Medication is active on med list        Passed - Patient is 18 years of age or older        Passed - Patient is not pregnant        Passed - No positive pregnancy on file in past 12 months        Last office visit 2/6/18.   Next 5 appointments (look out 90 days)    May 16, 2019  9:20 AM CDT  PHYSICAL with Hortencia Marcus MD  [facility] ([facility]) [address]  [facility] [address] [phone]        Future visit scheduled.  Prescription approved per [facility] Refill Protocol.  Jennifer Matias RN    "

## 2019-05-10 DIAGNOSIS — K21.9 GASTROESOPHAGEAL REFLUX DISEASE WITHOUT ESOPHAGITIS: ICD-10-CM

## 2019-05-10 NOTE — TELEPHONE ENCOUNTER
"Requested Prescriptions   Pending Prescriptions Disp Refills     omeprazole (PRILOSEC) 20 MG DR capsule [Pharmacy Med Name: OMEPRAZOLE DR 20 MG CAPSULE] 30 capsule 0     Sig: TAKE 1 CAPSULE BY MOUTH EVERY DAY   Last Written Prescription Date:  04/11/2019  Last Fill Quantity: 30,  # refills: 0   Last office visit: 2/6/2018 with prescribing provider:     Future Office Visit:   Next 5 appointments (look out 90 days)    May 16, 2019  9:20 AM CDT  PHYSICAL with Hortencia Marcus MD  [facility] ([facility]) [address]  [facility] [address]  [phone]           PPI Protocol Passed - 5/10/2019 12:34 PM        Passed - Not on Clopidogrel (unless Pantoprazole ordered)        Passed - No diagnosis of osteoporosis on record        Passed - Recent (12 mo) or future (30 days) visit within the authorizing provider's specialty     Patient had office visit in the last 12 months or has a visit in the next 30 days with authorizing provider or within the authorizing provider's specialty.  See \"Patient Info\" tab in inbasket, or \"Choose Columns\" in Meds & Orders section of the refill encounter.              Passed - Medication is active on med list        Passed - Patient is age 18 or older        Passed - No active pregnacy on record        Passed - No positive pregnancy test in past 12 months        "

## 2019-05-13 NOTE — TELEPHONE ENCOUNTER
Medication is being filled for 1 time refill only due to:  Patient needs to be seen because it has been more than one year since last visit.    Next 5 appointments (look out 90 days)    May 16, 2019  9:20 AM CDT  PHYSICAL with Hortencia Marcus MD  [facility]) [address]  [facility] [address]  [phone]

## 2019-05-15 DIAGNOSIS — L50.9 URTICARIA: ICD-10-CM

## 2019-05-15 NOTE — TELEPHONE ENCOUNTER
"Requested Prescriptions   Pending Prescriptions Disp Refills     hydrOXYzine (ATARAX) 10 MG tablet [Pharmacy Med Name: HYDROXYZINE HCL 10 MG TABLET] 30 tablet 0     Sig: TAKE 1 TABLET BY MOUTH EVERYDAY AT BEDTIME   Last Written Prescription Date:  04/18/2019  Last Fill Quantity: 30,  # refills: 0   Last office visit: 2/6/2018 with prescribing provider:     Future Office Visit:   Next 5 appointments (look out 90 days)    May 16, 2019  9:20 AM CDT  PHYSICAL with Hortencia Marcus MD  [facility]) [address]  [facility] [address]  [phone]           Antihistamines Protocol Passed - 5/15/2019  1:45 AM        Passed - Recent (12 mo) or future (30 days) visit within the authorizing provider's specialty     Patient had office visit in the last 12 months or has a visit in the next 30 days with authorizing provider or within the authorizing provider's specialty.  See \"Patient Info\" tab in inbasket, or \"Choose Columns\" in Meds & Orders section of the refill encounter.              Passed - Patient is age 3 or older     Apply age and/or weight-based dosing for peds patients age 3 and older.    Forward request to provider for patients under the age of 3.          Passed - Medication is active on med list        "

## 2019-05-16 RX ORDER — HYDROXYZINE HYDROCHLORIDE 10 MG/1
TABLET, FILM COATED ORAL
Qty: 30 TABLET | Refills: 0 | Status: SHIPPED | OUTPATIENT
Start: 2019-05-16 | End: 2019-06-04

## 2019-05-24 DIAGNOSIS — D68.9 COAGULOPATHY (H): ICD-10-CM

## 2019-05-24 RX ORDER — WARFARIN SODIUM 2.5 MG/1
TABLET ORAL
Qty: 24 TABLET | Refills: 0 | Status: SHIPPED | OUTPATIENT
Start: 2019-05-24 | End: 2019-06-24

## 2019-05-24 NOTE — TELEPHONE ENCOUNTER
"Requested Prescriptions   Pending Prescriptions Disp Refills     warfarin (COUMADIN) 2.5 MG tablet [Pharmacy Med Name: WARFARIN SODIUM  Last Written Prescription Date:  4/26/2019  Last Fill Quantity: 24,  # refills: 0   Last office visit: 2/6/2018 with prescribing provider:     Future Office Visit:   Next 5 appointments (look out 90 days)    Jun 04, 2019  8:20 AM CDT  PHYSICAL with Hortencia Marcus MD  [facility] ([facility]) [address]  [facility] [id number] [phone]        2.5 MG TABLET] 24 tablet 0     Sig: TAKE 1 TAB BY MOUTH DAILY ON SUN, MON, TUES, THURS, SAT AND 1/2 TAB ON WED AND FRI AS DIRECTED       Vitamin K Antagonists Failed - 5/24/2019  1:45 AM        Failed - INR is within goal in the past 6 weeks     Confirm INR is within goal in the past 6 weeks.     Recent Labs   Lab Test 04/16/19   INR 2.0*                       Passed - Recent (12 mo) or future (30 days) visit within the authorizing provider's specialty     Patient had office visit in the last 12 months or has a visit in the next 30 days with authorizing provider or within the authorizing provider's specialty.  See \"Patient Info\" tab in inbasket, or \"Choose Columns\" in Meds & Orders section of the refill encounter.              Passed - Medication is active on med list        Passed - Patient is 18 years of age or older        Passed - Patient is not pregnant        Passed - No positive pregnancy on file in past 12 months        "

## 2019-05-24 NOTE — TELEPHONE ENCOUNTER
Future visit scheduled.  Warfarin dosing is managed by INR Clinic.  Prescription approved per [facility] Refill Protocol.  Jennifer Matias RN

## 2019-05-28 ENCOUNTER — ANTICOAGULATION THERAPY VISIT (OUTPATIENT)
Dept: ANTICOAGULATION | Facility: CLINIC | Age: 49
End: 2019-05-28
Payer: COMMERCIAL

## 2019-05-28 DIAGNOSIS — D68.9 COAGULOPATHY (H): ICD-10-CM

## 2019-05-28 DIAGNOSIS — Z79.01 LONG TERM CURRENT USE OF ANTICOAGULANTS WITH INR GOAL OF 2.0-3.0: ICD-10-CM

## 2019-05-28 LAB — INR POINT OF CARE: 2.1 (ref 0.86–1.14)

## 2019-05-28 PROCEDURE — 36416 COLLJ CAPILLARY BLOOD SPEC: CPT

## 2019-05-28 PROCEDURE — 99207 ZZC NO CHARGE NURSE ONLY: CPT

## 2019-05-28 PROCEDURE — 85610 PROTHROMBIN TIME: CPT | Mod: QW

## 2019-05-28 NOTE — PROGRESS NOTES
ANTICOAGULATION FOLLOW-UP CLINIC VISIT    Patient Name:  Renetta Reid  Date:  2019  Contact Type:  Face to Face    SUBJECTIVE:  Patient Findings     Comments:   The patient was assessed for diet, medication, and activity level changes, missed or extra doses, bruising or bleeding, with no problem findings.          Clinical Outcomes     Negatives:   Major bleeding event, Thromboembolic event, Anticoagulation-related hospital admission, Anticoagulation-related ED visit, Anticoagulation-related fatality    Comments:   The patient was assessed for diet, medication, and activity level changes, missed or extra doses, bruising or bleeding, with no problem findings.             OBJECTIVE    INR Protime   Date Value Ref Range Status   2019 2.1 (A) 0.86 - 1.14 Final     Chromogenic Factor 10   Date Value Ref Range Status   2010 60 60 - 140 % Final     Comment:     Therapeutic Range: A Chromogenic Factor 10 level of 20-40% inversely   correlates   with an INR of 2-3 for patients receiving Warfarin. Chromogenic Factor 10   levels below 20% indicate an INR greater than 3, and levels above 40%   indicate   an INR lessthan 2.       ASSESSMENT / PLAN  INR assessment THER    Recheck INR In: 6 WEEKS    INR Location Clinic      Anticoagulation Summary  As of 2019    INR goal:   2.0-3.0   TTR:   79.3 % (3 y)   INR used for dosin.1 (2019)   Warfarin maintenance plan:   1.25 mg (2.5 mg x 0.5) every Wed, Fri; 2.5 mg (2.5 mg x 1) all other days   Full warfarin instructions:   1.25 mg every Wed, Fri; 2.5 mg all other days   Weekly warfarin total:   15 mg   No change documented:   Elaine Lu RN   Plan last modified:   Elaine Lu RN (2016)   Next INR check:   2019   Priority:   INR   Target end date:       Indications    Long term current use of anticoagulants with INR goal of 2.0-3.0 [Z79.01]  Coagulopathy (H) [D68.9]             Anticoagulation Episode Summary     INR check location:        Preferred lab:       Send INR reminders to:   [facility]    Comments:         Anticoagulation Care Providers     Provider Role Specialty Phone number    Hortencia Marcus MD Responsible Internal Medicine [phone]            See the Encounter Report to view Anticoagulation Flowsheet and Dosing Calendar (Go to Encounters tab in chart review, and find the Anticoagulation Therapy Visit)    Dosage adjustment made based on physician directed care plan.    Elaine Lu RN

## 2019-06-04 ENCOUNTER — OFFICE VISIT (OUTPATIENT)
Dept: INTERNAL MEDICINE | Facility: CLINIC | Age: 49
End: 2019-06-04
Payer: COMMERCIAL

## 2019-06-04 VITALS
WEIGHT: 267.7 LBS | TEMPERATURE: 98 F | DIASTOLIC BLOOD PRESSURE: 104 MMHG | HEART RATE: 60 BPM | SYSTOLIC BLOOD PRESSURE: 148 MMHG | RESPIRATION RATE: 16 BRPM | BODY MASS INDEX: 49.26 KG/M2 | HEIGHT: 62 IN | OXYGEN SATURATION: 97 %

## 2019-06-04 DIAGNOSIS — Z00.00 ENCOUNTER FOR ROUTINE ADULT HEALTH EXAMINATION WITHOUT ABNORMAL FINDINGS: Primary | ICD-10-CM

## 2019-06-04 DIAGNOSIS — F32.A MINOR DEPRESSION: ICD-10-CM

## 2019-06-04 DIAGNOSIS — E66.01 MORBID OBESITY (H): ICD-10-CM

## 2019-06-04 DIAGNOSIS — R03.0 ELEVATED BLOOD PRESSURE READING WITHOUT DIAGNOSIS OF HYPERTENSION: ICD-10-CM

## 2019-06-04 DIAGNOSIS — R35.0 URINARY FREQUENCY: ICD-10-CM

## 2019-06-04 DIAGNOSIS — K21.9 GASTROESOPHAGEAL REFLUX DISEASE WITHOUT ESOPHAGITIS: ICD-10-CM

## 2019-06-04 DIAGNOSIS — L50.9 URTICARIA: ICD-10-CM

## 2019-06-04 DIAGNOSIS — F41.9 ANXIETY: ICD-10-CM

## 2019-06-04 DIAGNOSIS — D69.0 HSP (HENOCH SCHONLEIN PURPURA) (H): ICD-10-CM

## 2019-06-04 DIAGNOSIS — D68.9 COAGULOPATHY (H): ICD-10-CM

## 2019-06-04 PROCEDURE — 99213 OFFICE O/P EST LOW 20 MIN: CPT | Mod: 25 | Performed by: INTERNAL MEDICINE

## 2019-06-04 PROCEDURE — 80061 LIPID PANEL: CPT | Performed by: INTERNAL MEDICINE

## 2019-06-04 PROCEDURE — 36415 COLL VENOUS BLD VENIPUNCTURE: CPT | Performed by: INTERNAL MEDICINE

## 2019-06-04 PROCEDURE — 80048 BASIC METABOLIC PNL TOTAL CA: CPT | Performed by: INTERNAL MEDICINE

## 2019-06-04 PROCEDURE — 99396 PREV VISIT EST AGE 40-64: CPT | Performed by: INTERNAL MEDICINE

## 2019-06-04 RX ORDER — HYDROXYZINE HYDROCHLORIDE 10 MG/1
10 TABLET, FILM COATED ORAL AT BEDTIME
Qty: 90 TABLET | Refills: 3 | Status: SHIPPED | OUTPATIENT
Start: 2019-06-04 | End: 2020-06-23

## 2019-06-04 RX ORDER — WARFARIN SODIUM 2.5 MG/1
TABLET ORAL
Qty: 24 TABLET | Status: CANCELLED | OUTPATIENT
Start: 2019-06-04

## 2019-06-04 ASSESSMENT — ENCOUNTER SYMPTOMS
HEADACHES: 0
PALPITATIONS: 0
SORE THROAT: 0
FREQUENCY: 0
CHILLS: 0
WEAKNESS: 0
EYE PAIN: 0
FEVER: 0
BREAST MASS: 0
SHORTNESS OF BREATH: 0
NERVOUS/ANXIOUS: 1
HEMATOCHEZIA: 0
HEMATURIA: 0
ARTHRALGIAS: 0
DIZZINESS: 0
DYSURIA: 0
HEARTBURN: 0
CONSTIPATION: 0
JOINT SWELLING: 0
DIARRHEA: 0
PARESTHESIAS: 0
ABDOMINAL PAIN: 0
MYALGIAS: 0
COUGH: 0
NAUSEA: 0

## 2019-06-04 ASSESSMENT — ANXIETY QUESTIONNAIRES
1. FEELING NERVOUS, ANXIOUS, OR ON EDGE: MORE THAN HALF THE DAYS
2. NOT BEING ABLE TO STOP OR CONTROL WORRYING: MORE THAN HALF THE DAYS
GAD7 TOTAL SCORE: 7
7. FEELING AFRAID AS IF SOMETHING AWFUL MIGHT HAPPEN: NOT AT ALL
5. BEING SO RESTLESS THAT IT IS HARD TO SIT STILL: NOT AT ALL
3. WORRYING TOO MUCH ABOUT DIFFERENT THINGS: MORE THAN HALF THE DAYS
6. BECOMING EASILY ANNOYED OR IRRITABLE: NOT AT ALL

## 2019-06-04 ASSESSMENT — MIFFLIN-ST. JEOR: SCORE: 1793.56

## 2019-06-04 ASSESSMENT — PATIENT HEALTH QUESTIONNAIRE - PHQ9
SUM OF ALL RESPONSES TO PHQ QUESTIONS 1-9: 9
5. POOR APPETITE OR OVEREATING: SEVERAL DAYS

## 2019-06-04 NOTE — NURSING NOTE
"BP (!) 148/104   Pulse 60   Temp 98  F (36.7  C) (Oral)   Resp 16   Ht 1.568 m (5' 1.75\")   Wt 121.4 kg (267 lb 11.2 oz)   SpO2 97%   BMI 49.36 kg/m      "

## 2019-06-04 NOTE — PROGRESS NOTES
SUBJECTIVE:   CC: Renetta Reid is an 48 year old woman who presents for preventive health visit.     Healthy Habits:     Getting at least 3 servings of Calcium per day:  NO    Bi-annual eye exam:  NO    Dental care twice a year:  Yes    Sleep apnea or symptoms of sleep apnea:  None    Diet:  Regular (no restrictions)    Frequency of exercise:  None    Taking medications regularly:  Yes    Medication side effects:  Not applicable    PHQ-2 Total Score: 2    Additional concerns today:  No    Current concerns:   1. Mood concerns: She reports that she has been feeling a little down and anxious for a while.  A lot of this started when her parents were ill, they have subsequently  so she does have some grieving.  She has stress over dealing with her adult children, work.  She does not feel her symptoms are severe but she is feeling bad about herself that she has little energy, she feels she should be working harder on diet, exercise, weight loss which would probably help her feel better but it is kind of the vicious cycle it is hard for her to break.  2.  Urinary frequency: This is been going on for years.  She has nocturia usually about 2 times a night.  She only has 1 caffeine per day.  She does not drink any other carbonated but just.  She does get a little bit of urge incontinence, rare stress and constant with symptoms    Problems:   Coagulopathy: No new clotting, remains on  warfarin  Urticaria: She is still fairly bothered.  She has been having a lot of itching in the evening.  She had previously been hydroxyzine 20 mg.  She has never taken Zyrtec for it.   Elevated blood pressure: She has not checked it lately.  She does feel fairly stressed today.  HSP: No acute changes            Today's PHQ-2 Score:   PHQ-2 (  [employer]) 2019   Q1: Little interest or pleasure in doing things 1   Q2: Feeling down, depressed or hopeless 1   PHQ-2 Score 2   Q1: Little interest or pleasure in doing things Several days    Q2: Feeling down, depressed or hopeless Several days   PHQ-2 Score 2       Abuse: Current or Past(Physical, Sexual or Emotional)- No  Do you feel safe in your environment? Yes    Social History     Tobacco Use     Smoking status: Never Smoker     Smokeless tobacco: Never Used   Substance Use Topics     Alcohol use: Yes     Comment: socially     If you drink alcohol do you typically have >3 drinks per day or >7 drinks per week? Yes      Alcohol Use 6/4/2019   Prescreen: >3 drinks/day or >7 drinks/week? Yes   Prescreen: >3 drinks/day or >7 drinks/week? -   AUDIT SCORE  6     AUDIT - Alcohol Use Disorders Identification Test - Reproduced from the World Health Organization Audit 2001 (Second Edition) 6/4/2019   1.  How often do you have a drink containing alcohol? 2 to 3 times a week   2.  How many drinks containing alcohol do you have on a typical day when you are drinking? 3 or 4   3.  How often do you have five or more drinks on one occasion? Monthly   4.  How often during the last year have you found that you were not able to stop drinking once you had started? Never   5.  How often during the last year have you failed to do what was normally expected of you because of drinking? Never   6.  How often during the last year have you needed a first drink in the morning to get yourself going after a heavy drinking session? Never   7.  How often during the last year have you had a feeling of guilt or remorse after drinking? Never   8.  How often during the last year have you been unable to remember what happened the night before because of your drinking? Never   9.  Have you or someone else been injured because of your drinking? No   10. Has a relative, friend, doctor or other health care worker been concerned about your drinking or suggested you cut down? No   TOTAL SCORE 6       Reviewed orders with patient.  Reviewed health maintenance and updated orders accordingly - Yes      Mammogram Screening: Patient over age  50, mutual decision to screen reflected in health maintenance.    Pertinent mammograms are reviewed under the imaging tab.  History of abnormal Pap smear: NO - age 30-65 PAP every 5 years with negative HPV co-testing recommended  PAP / HPV Latest Ref Rng & Units 2/7/2017 10/25/2013 7/26/2010   PAP - NIL NIL NIL   HPV 16 DNA NEG Negative - -   HPV 18 DNA NEG Negative - -   OTHER HR HPV NEG Negative - -     Reviewed and updated as needed this visit by clinical staff         Reviewed and updated as needed this visit by Provider        Patient Active Problem List   Diagnosis     Calculus of kidney     CARDIOVASCULAR SCREENING; LDL GOAL LESS THAN 160     Coagulopathy (H)     HSP (Henoch Schonlein purpura) (H)     Elevated blood pressure reading without diagnosis of hypertension     Gastroesophageal reflux disease without esophagitis     Long term current use of anticoagulants with INR goal of 2.0-3.0     Current Outpatient Medications   Medication Sig Dispense Refill     hydrOXYzine (ATARAX) 10 MG tablet TAKE 1 TABLET BY MOUTH EVERYDAY AT BEDTIME 30 tablet 0     omeprazole (PRILOSEC) 20 MG DR capsule TAKE 1 CAPSULE BY MOUTH EVERY DAY 30 capsule 0     warfarin (COUMADIN) 2.5 MG tablet TAKE 1 TAB BY MOUTH DAILY ON SUN, MON, TUES, THURS, SAT AND 1/2 TAB ON WED AND FRI AS DIRECTED 24 tablet 0        Review of Systems   Constitutional: Negative for chills and fever.   HENT: Negative for congestion, ear pain, hearing loss and sore throat.    Eyes: Negative for pain and visual disturbance.   Respiratory: Negative for cough and shortness of breath.    Cardiovascular: Negative for chest pain, palpitations and peripheral edema.   Gastrointestinal: Negative for abdominal pain, constipation, diarrhea, heartburn, hematochezia and nausea.   Breasts:  Negative for tenderness, breast mass and discharge.   Genitourinary: Positive for urgency. Negative for dysuria, frequency, genital sores, hematuria, pelvic pain, vaginal bleeding and  "vaginal discharge.   Musculoskeletal: Negative for arthralgias, joint swelling and myalgias.   Skin: Negative for rash.   Neurological: Negative for dizziness, weakness, headaches and paresthesias.   Psychiatric/Behavioral: Negative for mood changes. The patient is nervous/anxious.           OBJECTIVE:   BP (!) 148/104   Pulse 60   Temp 98  F (36.7  C) (Oral)   Resp 16   Ht 1.568 m (5' 1.75\")   Wt 121.4 kg (267 lb 11.2 oz)   LMP 05/04/2019   SpO2 97%   BMI 49.36 kg/m    Physical Exam        Objective:  Patient alert, in no acute distress  BP (!) 148/104   Pulse 60   Temp 98  F (36.7  C) (Oral)   Resp 16   Ht 1.568 m (5' 1.75\")   Wt 121.4 kg (267 lb 11.2 oz)   LMP 05/04/2019   SpO2 97%   BMI 49.36 kg/m      HEENT: extraocular movements are intact, pupils equal and reactive to light and accommodation, TMs clear, oropharynx clear  NECK: Neck supple. No adenopathy. Thyroid symmetric, normal size,  PULMONARY: clear to auscultation  CARDIAC: regular rate and rhythm and no murmurs, clicks, or gallops  PULSES: 2/2 throughout  BACK: no spinal or CVAT  ABDOMINAL: Soft, nontender.  Normal bowel sounds.  No hepatosplenomegaly or abnormal masses  BREAST: No breast masses or tenderness, No axillary masses or tenderness and No galactorrhea  PELVIC: external genitalia normal,, bimanual exam difficult due to obesity but no apparent masses are appreciated  REFLEXES: 2+ throughout  SKIN: unremarkable                 ASSESSMENT/PLAN:   1. Encounter for routine adult health examination without abnormal findings  Up to date  - Basic metabolic panel  (Ca, Cl, CO2, Creat, Gluc, K, Na, BUN)  - Lipid panel reflex to direct LDL Fasting    2. Morbid obesity (H)  Discussed options and recommend she consider the can do program to help with motivation.     3. Coagulopathy (H)  Stable, continue warfarin    4. HSP (Henoch Schonlein purpura) (H)  Stable without new symptoms.     5. Elevated blood pressure reading without diagnosis of " "hypertension  Likely she does have hypertension, recommend a nurse check in 2 weeks when she feels her stress may be slightly better    6. Anxiety  Mild symptoms, discussed options, will try to work with the Can Do program for now    7. Minor depression  Mild symptoms, work with the can do program    8. Urticaria  Suggest she try Zyrtec, too sedating can increase the nighttime hydroxyzine  - hydrOXYzine (ATARAX) 10 MG tablet; Take 1 tablet (10 mg) by mouth At Bedtime  Dispense: 90 tablet; Refill: 3    9. Urinary frequency  Discussed options, working on more frequent urination avoiding substances that irritate bladder.  She declines medication at this time    10. Gastroesophageal reflux disease without esophagitis    - omeprazole (PRILOSEC) 20 MG DR capsule; Take 1 capsule (20 mg) by mouth daily  Dispense: 90 capsule; Refill: 3    COUNSELING:  Reviewed preventive health counseling, as reflected in patient instructions    Estimated body mass index is 48.83 kg/m  as calculated from the following:    Height as of 2/6/18: 1.575 m (5' 2\").    Weight as of 2/6/18: 121.1 kg (267 lb).    Weight management plan: Specific weight management program called can do discussed     reports that she has never smoked. She has never used smokeless tobacco.      Counseling Resources:  ATP IV Guidelines  Pooled Cohorts Equation Calculator  Breast Cancer Risk Calculator  FRAX Risk Assessment  ICSI Preventive Guidelines  Dietary Guidelines for Americans, 2010  USDA's MyPlate  ASA Prophylaxis  Lung CA Screening    Hortencia Marcus MD  [facility]  "

## 2019-06-05 LAB
ANION GAP SERPL CALCULATED.3IONS-SCNC: 11 MMOL/L (ref 3–14)
BUN SERPL-MCNC: 12 MG/DL (ref 7–30)
CALCIUM SERPL-MCNC: 9 MG/DL (ref 8.5–10.1)
CHLORIDE SERPL-SCNC: 107 MMOL/L (ref 94–109)
CHOLEST SERPL-MCNC: 151 MG/DL
CO2 SERPL-SCNC: 21 MMOL/L (ref 20–32)
CREAT SERPL-MCNC: 0.68 MG/DL (ref 0.52–1.04)
GFR SERPL CREATININE-BSD FRML MDRD: >90 ML/MIN/{1.73_M2}
GLUCOSE SERPL-MCNC: 98 MG/DL (ref 70–99)
HDLC SERPL-MCNC: 55 MG/DL
LDLC SERPL CALC-MCNC: 78 MG/DL
NONHDLC SERPL-MCNC: 96 MG/DL
POTASSIUM SERPL-SCNC: 4.4 MMOL/L (ref 3.4–5.3)
SODIUM SERPL-SCNC: 139 MMOL/L (ref 133–144)
TRIGL SERPL-MCNC: 91 MG/DL

## 2019-06-05 ASSESSMENT — ANXIETY QUESTIONNAIRES: GAD7 TOTAL SCORE: 7

## 2019-06-14 PROBLEM — F32.A MINOR DEPRESSION: Status: ACTIVE | Noted: 2019-06-14

## 2019-06-18 ENCOUNTER — NURSE TRIAGE (OUTPATIENT)
Dept: INTERNAL MEDICINE | Facility: CLINIC | Age: 49
End: 2019-06-18

## 2019-06-18 ENCOUNTER — ALLIED HEALTH/NURSE VISIT (OUTPATIENT)
Dept: NURSING | Facility: CLINIC | Age: 49
End: 2019-06-18
Payer: COMMERCIAL

## 2019-06-18 VITALS — DIASTOLIC BLOOD PRESSURE: 106 MMHG | SYSTOLIC BLOOD PRESSURE: 162 MMHG

## 2019-06-18 DIAGNOSIS — I10 BENIGN ESSENTIAL HYPERTENSION: Primary | ICD-10-CM

## 2019-06-18 DIAGNOSIS — R03.0 ELEVATED BLOOD PRESSURE READING WITHOUT DIAGNOSIS OF HYPERTENSION: Primary | ICD-10-CM

## 2019-06-18 PROCEDURE — 99207 ZZC NO CHARGE NURSE ONLY: CPT

## 2019-06-18 RX ORDER — AMLODIPINE BESYLATE 5 MG/1
5 TABLET ORAL DAILY
Qty: 30 TABLET | Refills: 1 | Status: SHIPPED | OUTPATIENT
Start: 2019-06-18 | End: 2019-07-09

## 2019-06-18 NOTE — NURSING NOTE
Here for B/P check  =162/106 I did repeat the check 15 mins later and pressure was running higher    - spoke with RN and Amrit Hagen     RN assessed patient and will send mess to provider .               ANTONIETTA Farrar LPN

## 2019-06-18 NOTE — TELEPHONE ENCOUNTER
"Per 6/4/19 office visit note:  \"5. Elevated blood pressure reading without diagnosis of hypertension  Likely she does have hypertension, recommend a nurse check in 2 weeks when she feels her stress may be slightly better\"    Patient presents to the clinic today for a blood pressure check, blood pressure today was 162/106. Patient states she does not feel stressed out today. Denies being symptomatic- see assessment below. Care advise given per protocol.    Pharmacy selected. Please advise on next steps.     Additional Information    Negative: Sounds like a life-threatening emergency to the triager    Negative: Pregnant > 20 weeks and new hand or face swelling    Negative: Pregnant > 20 weeks and BP > 140/90    Negative: Systolic BP >= 160 OR Diastolic >= 100, and any cardiac or neurologic symptoms (e.g., chest pain, difficulty breathing, unsteady gait, blurred vision)    Negative: Patient sounds very sick or weak to the triager    Negative: BP Systolic BP >= 140 OR Diastolic >= 90 and postpartum < 4 weeks    Negative: Systolic BP >= 180 OR Diastolic >= 110, and missed most recent dose of blood pressure medication    Negative: Systolic BP >= 180 OR Diastolic >= 110    Negative: Patient wants to be seen    Negative: Ran out of BP medications    Negative: Taking BP medications and feels is having side effects (e.g., impotence, cough, dizziness)    Systolic BP >= 160 OR Diastolic >= 100    Protocols used: HIGH BLOOD PRESSURE-A-OH      "

## 2019-06-19 NOTE — TELEPHONE ENCOUNTER
Called patient and advised her of the message below, patient verbalized understanding. Assisted in scheduling a nurse-only blood pressure check for two weeks out.

## 2019-06-24 DIAGNOSIS — D68.9 COAGULOPATHY (H): ICD-10-CM

## 2019-06-24 NOTE — TELEPHONE ENCOUNTER
"Requested Prescriptions   Pending Prescriptions Disp Refills     warfarin (COUMADIN) 2.5 MG tablet [Pharmacy Med Name: WARFARIN SODIUM  Last Written Prescription Date:  5/24/2019  Last Fill Quantity: 24,  # refills: 0   Last office visit: 6/4/2019 with prescribing provider:     Future Office Visit:   Next 5 appointments (look out 90 days)    Jul 03, 2019  2:30 PM CDT  Nurse Only with [facility] NURSE  [facility] ([facility] [address]  [facility] [id number] [phone]        2.5 MG TABLET] 24 tablet 0     Sig: TAKE 1 TAB BY MOUTH DAILY ON SUN, MON, TUES, THURS, SAT AND 1/2 TAB ON WED AND FRI AS DIRECTED       Vitamin K Antagonists Failed - 6/24/2019  2:42 PM        Failed - INR is within goal in the past 6 weeks     Confirm INR is within goal in the past 6 weeks.     Recent Labs   Lab Test 05/28/19   INR 2.1*                       Passed - Recent (12 mo) or future (30 days) visit within the authorizing provider's specialty     Patient had office visit in the last 12 months or has a visit in the next 30 days with authorizing provider or within the authorizing provider's specialty.  See \"Patient Info\" tab in inbasket, or \"Choose Columns\" in Meds & Orders section of the refill encounter.              Passed - Medication is active on med list        Passed - Patient is 18 years of age or older        Passed - Patient is not pregnant        Passed - No positive pregnancy on file in past 12 months        "

## 2019-06-25 RX ORDER — WARFARIN SODIUM 2.5 MG/1
TABLET ORAL
Qty: 75 TABLET | Refills: 0 | Status: SHIPPED | OUTPATIENT
Start: 2019-06-25 | End: 2019-09-24

## 2019-07-09 ENCOUNTER — ANTICOAGULATION THERAPY VISIT (OUTPATIENT)
Dept: ANTICOAGULATION | Facility: CLINIC | Age: 49
End: 2019-07-09
Payer: COMMERCIAL

## 2019-07-09 ENCOUNTER — ALLIED HEALTH/NURSE VISIT (OUTPATIENT)
Dept: NURSING | Facility: CLINIC | Age: 49
End: 2019-07-09
Payer: COMMERCIAL

## 2019-07-09 VITALS — SYSTOLIC BLOOD PRESSURE: 138 MMHG | DIASTOLIC BLOOD PRESSURE: 88 MMHG

## 2019-07-09 DIAGNOSIS — I10 BENIGN ESSENTIAL HYPERTENSION: ICD-10-CM

## 2019-07-09 DIAGNOSIS — D68.9 COAGULOPATHY (H): ICD-10-CM

## 2019-07-09 DIAGNOSIS — Z53.9 DIAGNOSIS NOT YET DEFINED: Primary | ICD-10-CM

## 2019-07-09 DIAGNOSIS — Z79.01 LONG TERM CURRENT USE OF ANTICOAGULANTS WITH INR GOAL OF 2.0-3.0: ICD-10-CM

## 2019-07-09 LAB — INR POINT OF CARE: 2 (ref 0.86–1.14)

## 2019-07-09 PROCEDURE — 85610 PROTHROMBIN TIME: CPT | Mod: QW

## 2019-07-09 PROCEDURE — 99207 ZZC NO CHARGE NURSE ONLY: CPT

## 2019-07-09 PROCEDURE — 36416 COLLJ CAPILLARY BLOOD SPEC: CPT

## 2019-07-09 RX ORDER — AMLODIPINE BESYLATE 5 MG/1
5 TABLET ORAL DAILY
Qty: 90 TABLET | Refills: 0 | Status: SHIPPED | OUTPATIENT
Start: 2019-07-09 | End: 2019-10-15

## 2019-07-09 NOTE — TELEPHONE ENCOUNTER
Please advise that blood pressure looks really good today, recommend another nurse check in a month and follow-up with me in about 3 months.

## 2019-07-09 NOTE — PROGRESS NOTES
ANTICOAGULATION FOLLOW-UP CLINIC VISIT    Patient Name:  Renetta Reid  Date:  2019  Contact Type:  Face to Face    SUBJECTIVE:  Patient Findings     Comments:   The patient was assessed for diet, medication, and activity level changes, missed or extra doses, bruising or bleeding, with no problem findings.          Clinical Outcomes     Negatives:   Major bleeding event, Thromboembolic event, Anticoagulation-related hospital admission, Anticoagulation-related ED visit, Anticoagulation-related fatality    Comments:   The patient was assessed for diet, medication, and activity level changes, missed or extra doses, bruising or bleeding, with no problem findings.             OBJECTIVE    INR Protime   Date Value Ref Range Status   2019 2.0 (A) 0.86 - 1.14 Final     Chromogenic Factor 10   Date Value Ref Range Status   2010 60 60 - 140 % Final     Comment:     Therapeutic Range: A Chromogenic Factor 10 level of 20-40% inversely   correlates   with an INR of 2-3 for patients receiving Warfarin. Chromogenic Factor 10   levels below 20% indicate an INR greater than 3, and levels above 40%   indicate   an INR lessthan 2.       ASSESSMENT / PLAN  INR assessment THER    Recheck INR In: 6 WEEKS    INR Location Clinic      Anticoagulation Summary  As of 2019    INR goal:   2.0-3.0   TTR:   80.1 % (3.1 y)   INR used for dosin.0 (2019)   Warfarin maintenance plan:   1.25 mg (2.5 mg x 0.5) every Wed, Fri; 2.5 mg (2.5 mg x 1) all other days   Full warfarin instructions:   1.25 mg every Wed, Fri; 2.5 mg all other days   Weekly warfarin total:   15 mg   No change documented:   Elaine Lu RN   Plan last modified:   Elaine Lu RN (2016)   Next INR check:   2019   Priority:   INR   Target end date:       Indications    Long term current use of anticoagulants with INR goal of 2.0-3.0 [Z79.01]  Coagulopathy (H) [D68.9]             Anticoagulation Episode Summary     INR check location:        Preferred lab:       Send INR reminders to:   PATRICIO OLIVERAS    Comments:         Anticoagulation Care Providers     Provider Role Specialty Phone number    Hortencia Marcus MD Responsible Internal Medicine [phone]            See the Encounter Report to view Anticoagulation Flowsheet and Dosing Calendar (Go to Encounters tab in chart review, and find the Anticoagulation Therapy Visit)    Dosage adjustment made based on physician directed care plan.    Elaine Lu RN

## 2019-07-09 NOTE — TELEPHONE ENCOUNTER
"Blood pressure in clinic today was 138/88. Pt has one week left of Norvasc. Please refill. Also, please advise when to follow up.     Requested Prescriptions   Pending Prescriptions Disp Refills     amLODIPine (NORVASC) 5 MG tablet 30 tablet 1     Sig: Take 1 tablet (5 mg) by mouth daily       Calcium Channel Blockers Protocol  Passed - 7/9/2019  3:12 PM        Passed - Blood pressure under 140/90 in past 12 months     BP Readings from Last 3 Encounters:   07/09/19 138/88   06/18/19 (!) 162/106   06/04/19 (!) 148/104                 Passed - Recent (12 mo) or future (30 days) visit within the authorizing provider's specialty     Patient had office visit in the last 12 months or has a visit in the next 30 days with authorizing provider or within the authorizing provider's specialty.  See \"Patient Info\" tab in inbasket, or \"Choose Columns\" in Meds & Orders section of the refill encounter.              Passed - Medication is active on med list        Passed - Patient is age 18 or older        Passed - No active pregnancy on record        Passed - Normal serum creatinine on file in past 12 months     Recent Labs   Lab Test 06/04/19  0916   CR 0.68             Passed - No positive pregnancy test in past 12 months                  "

## 2019-08-07 ENCOUNTER — ALLIED HEALTH/NURSE VISIT (OUTPATIENT)
Dept: NURSING | Facility: CLINIC | Age: 49
End: 2019-08-07
Payer: COMMERCIAL

## 2019-08-07 VITALS — DIASTOLIC BLOOD PRESSURE: 90 MMHG | SYSTOLIC BLOOD PRESSURE: 160 MMHG

## 2019-08-07 DIAGNOSIS — I10 ESSENTIAL HYPERTENSION: Primary | ICD-10-CM

## 2019-09-24 DIAGNOSIS — D68.9 COAGULOPATHY (H): ICD-10-CM

## 2019-09-25 RX ORDER — WARFARIN SODIUM 2.5 MG/1
TABLET ORAL
Qty: 75 TABLET | Refills: 0 | Status: SHIPPED | OUTPATIENT
Start: 2019-09-25 | End: 2019-12-17

## 2019-09-25 NOTE — TELEPHONE ENCOUNTER
"Requested Prescriptions   Pending Prescriptions Disp Refills     warfarin ANTICOAGULANT (COUMADIN) 2.5 MG tablet [Pharmacy Med Name:  Last Written Prescription Date:  6/25/2019  Last Fill Quantity: 75,  # refills: 0   Last office visit: 6/4/2019 with prescribing provider:     Future Office Visit:   Next 5 appointments (look out 90 days)    Oct 03, 2019  2:20 PM CDT  Office Visit with Hortencia Marcus MD  [facility] [address]  [facility] [id number] [phone]        WARFARIN SODIUM 2.5 MG TABLET] 75 tablet 0     Sig: TAKE 1 TAB BY MOUTH DAILY ON SUN, MON, TUES, THURS, SAT AND 1/2 TAB ON WED AND FRI AS DIRECTED       Vitamin K Antagonists Failed - 9/24/2019 10:09 PM        Failed - INR is within goal in the past 6 weeks     Confirm INR is within goal in the past 6 weeks.     Recent Labs   Lab Test 07/09/19   INR 2.0*                       Failed - Medication is active on med list        Passed - Recent (12 mo) or future (30 days) visit within the authorizing provider's specialty     Patient had office visit in the last 12 months or has a visit in the next 30 days with authorizing provider or within the authorizing provider's specialty.  See \"Patient Info\" tab in inbasket, or \"Choose Columns\" in Meds & Orders section of the refill encounter.              Passed - Patient is 18 years of age or older        Passed - Patient is not pregnant        Passed - No positive pregnancy on file in past 12 months        "

## 2019-09-26 ENCOUNTER — ANTICOAGULATION THERAPY VISIT (OUTPATIENT)
Dept: ANTICOAGULATION | Facility: CLINIC | Age: 49
End: 2019-09-26
Payer: COMMERCIAL

## 2019-09-26 DIAGNOSIS — D68.9 COAGULOPATHY (H): ICD-10-CM

## 2019-09-26 DIAGNOSIS — Z79.01 LONG TERM CURRENT USE OF ANTICOAGULANTS WITH INR GOAL OF 2.0-3.0: ICD-10-CM

## 2019-09-26 LAB — INR POINT OF CARE: 1.7 (ref 0.86–1.14)

## 2019-09-26 PROCEDURE — 36416 COLLJ CAPILLARY BLOOD SPEC: CPT

## 2019-09-26 PROCEDURE — 85610 PROTHROMBIN TIME: CPT | Mod: QW

## 2019-09-26 PROCEDURE — 99207 ZZC NO CHARGE NURSE ONLY: CPT

## 2019-09-26 NOTE — PROGRESS NOTES
ANTICOAGULATION FOLLOW-UP CLINIC VISIT    Patient Name:  Renetta Reid  Date:  2019  Contact Type:  Face to Face    SUBJECTIVE:  Patient Findings     Comments:   The patient was assessed for diet, medication, and activity level changes, missed or extra doses, bruising or bleeding, with no problem findings.  Patient had las INR 19.  Patient denies any identifiable changes that caused the subtherapeutic INR.           Clinical Outcomes     Comments:   The patient was assessed for diet, medication, and activity level changes, missed or extra doses, bruising or bleeding, with no problem findings.  Patient had las INR 19.  Patient denies any identifiable changes that caused the subtherapeutic INR.              OBJECTIVE    INR Protime   Date Value Ref Range Status   2019 1.7 (A) 0.86 - 1.14 Final     Chromogenic Factor 10   Date Value Ref Range Status   2010 60 60 - 140 % Final     Comment:     Therapeutic Range: A Chromogenic Factor 10 level of 20-40% inversely   correlates   with an INR of 2-3 for patients receiving Warfarin. Chromogenic Factor 10   levels below 20% indicate an INR greater than 3, and levels above 40%   indicate   an INR lessthan 2.       ASSESSMENT / PLAN  INR assessment SUB    Recheck INR In: 1 WEEK    INR Location Clinic      Anticoagulation Summary  As of 2019    INR goal:   2.0-3.0   TTR:   74.9 % (3.4 y)   INR used for dosin.7! (2019)   Warfarin maintenance plan:   1.25 mg (2.5 mg x 0.5) every Wed, Fri; 2.5 mg (2.5 mg x 1) all other days   Full warfarin instructions:   : 3.75 mg; Otherwise 1.25 mg every Wed, Fri; 2.5 mg all other days   Weekly warfarin total:   15 mg   Plan last modified:   Elaine Lu RN (2016)   Next INR check:   10/3/2019   Priority:   INR   Target end date:       Indications    Long term current use of anticoagulants with INR goal of 2.0-3.0 [Z79.01]  Coagulopathy (H) [D68.9]             Anticoagulation Episode Summary      INR check location:       Preferred lab:       Send INR reminders to:   PATRICIO CLEMENTE    Comments:         Anticoagulation Care Providers     Provider Role Specialty Phone number    Hortencia Marcus MD Responsible Internal Medicine [phone]            See the Encounter Report to view Anticoagulation Flowsheet and Dosing Calendar (Go to Encounters tab in chart review, and find the Anticoagulation Therapy Visit)    Dosage adjustment made based on physician directed care plan.    Elaine Lu RN

## 2019-09-29 ENCOUNTER — HEALTH MAINTENANCE LETTER (OUTPATIENT)
Age: 49
End: 2019-09-29

## 2019-10-10 ENCOUNTER — ANTICOAGULATION THERAPY VISIT (OUTPATIENT)
Dept: ANTICOAGULATION | Facility: CLINIC | Age: 49
End: 2019-10-10
Payer: COMMERCIAL

## 2019-10-10 DIAGNOSIS — Z79.01 LONG TERM CURRENT USE OF ANTICOAGULANTS WITH INR GOAL OF 2.0-3.0: ICD-10-CM

## 2019-10-10 DIAGNOSIS — D68.9 COAGULOPATHY (H): ICD-10-CM

## 2019-10-10 LAB — INR POINT OF CARE: 2.3 (ref 0.86–1.14)

## 2019-10-10 PROCEDURE — 36416 COLLJ CAPILLARY BLOOD SPEC: CPT

## 2019-10-10 PROCEDURE — 85610 PROTHROMBIN TIME: CPT | Mod: QW

## 2019-10-10 PROCEDURE — 99207 ZZC NO CHARGE NURSE ONLY: CPT

## 2019-10-10 NOTE — PROGRESS NOTES
ANTICOAGULATION FOLLOW-UP CLINIC VISIT    Patient Name:  Renetta Reid  Date:  10/10/2019  Contact Type:  Face to Face    SUBJECTIVE:  Patient Findings     Comments:   The patient was assessed for diet, medication, and activity level changes, missed or extra doses, bruising or bleeding, with no problem findings.          Clinical Outcomes     Negatives:   Major bleeding event, Thromboembolic event, Anticoagulation-related hospital admission, Anticoagulation-related ED visit, Anticoagulation-related fatality    Comments:   The patient was assessed for diet, medication, and activity level changes, missed or extra doses, bruising or bleeding, with no problem findings.             OBJECTIVE    INR Protime   Date Value Ref Range Status   10/10/2019 2.3 (A) 0.86 - 1.14 Final     Chromogenic Factor 10   Date Value Ref Range Status   2010 60 60 - 140 % Final     Comment:     Therapeutic Range: A Chromogenic Factor 10 level of 20-40% inversely   correlates   with an INR of 2-3 for patients receiving Warfarin. Chromogenic Factor 10   levels below 20% indicate an INR greater than 3, and levels above 40%   indicate   an INR lessthan 2.       ASSESSMENT / PLAN  INR assessment THER    Recheck INR In: 3 WEEKS    INR Location Clinic      Anticoagulation Summary  As of 10/10/2019    INR goal:   2.0-3.0   TTR:   74.6 % (3.4 y)   INR used for dosin.3 (10/10/2019)   Warfarin maintenance plan:   1.25 mg (2.5 mg x 0.5) every Wed, Fri; 2.5 mg (2.5 mg x 1) all other days   Full warfarin instructions:   1.25 mg every Wed, Fri; 2.5 mg all other days   Weekly warfarin total:   15 mg   No change documented:   Elaine Lu RN   Plan last modified:   Elaine Lu RN (2016)   Next INR check:   10/31/2019   Priority:   INR   Target end date:       Indications    Long term current use of anticoagulants with INR goal of 2.0-3.0 [Z79.01]  Coagulopathy (H) [D68.9]             Anticoagulation Episode Summary     INR check  location:       Preferred lab:       Send INR reminders to:   PATRICIO CLEMENTE    Comments:         Anticoagulation Care Providers     Provider Role Specialty Phone number    Hortencia Marcus MD Responsible Internal Medicine [phone]            See the Encounter Report to view Anticoagulation Flowsheet and Dosing Calendar (Go to Encounters tab in chart review, and find the Anticoagulation Therapy Visit)    Dosage adjustment made based on physician directed care plan.    Elaine Lu RN

## 2019-10-15 DIAGNOSIS — I10 BENIGN ESSENTIAL HYPERTENSION: ICD-10-CM

## 2019-10-16 NOTE — TELEPHONE ENCOUNTER
"Requested Prescriptions   Pending Prescriptions Disp Refills     amLODIPine (NORVASC) 5 MG tablet [Pharmacy Med Name: AMLODIPINE  Last Written Prescription Date:  7/9/2019  Last Fill Quantity: 90,  # refills: 0   Last office visit: 6/4/2019 with prescribing provider:     Future Office Visit:   Next 5 appointments (look out 90 days)    Nov 12, 2019 11:00 AM CST  Office Visit with Hortencia Marcus MD  [facility] [address]  [facility] [id number] [phone]        BESYLATE 5 MG TAB] 90 tablet 0     Sig: TAKE 1 TABLET BY MOUTH EVERY DAY       Calcium Channel Blockers Protocol  Failed - 10/15/2019 10:11 PM        Failed - Blood pressure under 140/90 in past 12 months     BP Readings from Last 3 Encounters:   08/07/19 (!) 160/90   07/09/19 138/88   06/18/19 (!) 162/106                 Passed - Recent (12 mo) or future (30 days) visit within the authorizing provider's specialty     Patient has had an office visit with the authorizing provider or a provider within the authorizing providers department within the previous 12 mos or has a future within next 30 days. See \"Patient Info\" tab in inbasket, or \"Choose Columns\" in Meds & Orders section of the refill encounter.              Passed - Medication is active on med list        Passed - Patient is age 18 or older        Passed - No active pregnancy on record        Passed - Normal serum creatinine on file in past 12 months     Recent Labs   Lab Test 06/04/19  0916   CR 0.68             Passed - No positive pregnancy test in past 12 months        "

## 2019-10-17 RX ORDER — AMLODIPINE BESYLATE 5 MG/1
TABLET ORAL
Qty: 90 TABLET | Refills: 0 | Status: SHIPPED | OUTPATIENT
Start: 2019-10-17 | End: 2019-11-12

## 2019-10-31 ENCOUNTER — ANTICOAGULATION THERAPY VISIT (OUTPATIENT)
Dept: ANTICOAGULATION | Facility: CLINIC | Age: 49
End: 2019-10-31
Payer: COMMERCIAL

## 2019-10-31 DIAGNOSIS — Z79.01 LONG TERM CURRENT USE OF ANTICOAGULANTS WITH INR GOAL OF 2.0-3.0: ICD-10-CM

## 2019-10-31 DIAGNOSIS — D68.9 COAGULOPATHY (H): ICD-10-CM

## 2019-10-31 LAB — INR POINT OF CARE: 2.2 (ref 0.86–1.14)

## 2019-10-31 PROCEDURE — 85610 PROTHROMBIN TIME: CPT | Mod: QW

## 2019-10-31 PROCEDURE — 36416 COLLJ CAPILLARY BLOOD SPEC: CPT

## 2019-10-31 PROCEDURE — 99207 ZZC NO CHARGE NURSE ONLY: CPT

## 2019-10-31 NOTE — PROGRESS NOTES
ANTICOAGULATION FOLLOW-UP CLINIC VISIT    Patient Name:  Renetta Reid  Date:  10/31/2019  Contact Type:  Face to Face    SUBJECTIVE:  Patient Findings     Positives:   Change in medications (Patient seen in an Urgent Care 10/23/19 for cold symptoms, Rx's for Pro Air inhaler, Prednisone, Promethazine DM syrup and Benzonatate. Prednisone was for 3 days, none of the other medications interact with warfarin)    Comments:   The patient was assessed for diet and activity level changes, missed or extra doses, bruising or bleeding, with no problem findings.          Clinical Outcomes     Comments:   The patient was assessed for diet and activity level changes, missed or extra doses, bruising or bleeding, with no problem findings.             OBJECTIVE    INR Protime   Date Value Ref Range Status   10/31/2019 2.2 (A) 0.86 - 1.14 Final     Chromogenic Factor 10   Date Value Ref Range Status   2010 60 60 - 140 % Final     Comment:     Therapeutic Range: A Chromogenic Factor 10 level of 20-40% inversely   correlates   with an INR of 2-3 for patients receiving Warfarin. Chromogenic Factor 10   levels below 20% indicate an INR greater than 3, and levels above 40%   indicate   an INR lessthan 2.       ASSESSMENT / PLAN  INR assessment THER    Recheck INR In: 6 WEEKS    INR Location Clinic      Anticoagulation Summary  As of 10/31/2019    INR goal:   2.0-3.0   TTR:   75.0 % (3.5 y)   INR used for dosin.2 (10/31/2019)   Warfarin maintenance plan:   1.25 mg (2.5 mg x 0.5) every Wed, Fri; 2.5 mg (2.5 mg x 1) all other days   Full warfarin instructions:   1.25 mg every Wed, Fri; 2.5 mg all other days   Weekly warfarin total:   15 mg   No change documented:   Elaine Lu, RN   Plan last modified:   Elaine Lu RN (2016)   Next INR check:   2019   Priority:   INR   Target end date:       Indications    Long term current use of anticoagulants with INR goal of 2.0-3.0 [Z79.01]  Coagulopathy (H) [D68.9]              Anticoagulation Episode Summary     INR check location:       Preferred lab:       Send INR reminders to:   PATRICIO CLEMENTE    Comments:         Anticoagulation Care Providers     Provider Role Specialty Phone number    Hortencia Marcus MD [facility] Internal Medicine [phone]            See the Encounter Report to view Anticoagulation Flowsheet and Dosing Calendar (Go to Encounters tab in chart review, and find the Anticoagulation Therapy Visit)    Dosage adjustment made based on physician directed care plan.    Elaine Lu RN

## 2019-11-01 ENCOUNTER — TELEPHONE (OUTPATIENT)
Dept: INTERNAL MEDICINE | Facility: CLINIC | Age: 49
End: 2019-11-01

## 2019-11-01 DIAGNOSIS — D68.9 COAGULOPATHY (H): ICD-10-CM

## 2019-11-01 DIAGNOSIS — Z79.01 LONG TERM CURRENT USE OF ANTICOAGULANTS WITH INR GOAL OF 2.0-3.0: ICD-10-CM

## 2019-11-01 NOTE — TELEPHONE ENCOUNTER
ANTICOAGULATION MANAGEMENT     Patient Name:  Renetta Reid  Date:  11/1/2019    ASSESSMENT /SUBJECTIVE:       Goal INR of 2.0-3.0      Warfarin dose taken: Warfarin taken as previously instructed    Diet: No new diet changes affecting INR    Medication changes/ interactions: Patient calls to report she was placed on ZPak    Previous INR: Therapeutic     S/S of bleeding or thromboembolism: No    New injury or illness:  Yes: URI     Upcoming surgery, procedure or cardioversion:  No    Additional findings: UC visit 11/1/2019      PLAN:    Spoke with Renetta regarding INR result and instructed:     Warfarin Dosing Instructions: Reduce warfarin to 1.25mg Sat then continue your current warfarin dose of 1.25mg Wed/Fri and 2.5mg the rest of the days    Instructed patient to follow up no later than: 11/6/2019    Education provided: Yes: Zpak and warfarin interaction      Renetta verbalizes understanding and agrees to warfarin dosing plan.    Instructed to call the Anticoagulation Clinic for any changes, questions or concerns. (#[phone])        OBJECTIVE:  INR Protime   Date Value Ref Range Status   10/31/2019 2.2 (A) 0.86 - 1.14 Final     Chromogenic Factor 10   Date Value Ref Range Status   08/05/2010 60 60 - 140 % Final     Comment:     Therapeutic Range: A Chromogenic Factor 10 level of 20-40% inversely   correlates   with an INR of 2-3 for patients receiving Warfarin. Chromogenic Factor 10   levels below 20% indicate an INR greater than 3, and levels above 40%   indicate   an INR lessthan 2.             Anticoagulation Summary  As of 11/1/2019    INR goal:   2.0-3.0   TTR:   75.0 % (3.5 y)   INR used for dosing:   No new INR was available at the time of this encounter.   Warfarin maintenance plan:   1.25 mg (2.5 mg x 0.5) every Wed, Fri; 2.5 mg (2.5 mg x 1) all other days   Full warfarin instructions:   11/2: 1.25 mg; Otherwise 1.25 mg every Wed, Fri; 2.5 mg all other days   Weekly warfarin total:   15 mg   Plan last  modified:   Elaine Lu RN (7/26/2016)   Next INR check:   11/6/2019   Priority:   INR   Target end date:       Indications    Long term current use of anticoagulants with INR goal of 2.0-3.0 [Z79.01]  Coagulopathy (H) [D68.9]             Anticoagulation Episode Summary     INR check location:       Preferred lab:       Send INR reminders to:   PATRICIO CLEMENTE    Comments:         Anticoagulation Care Providers     Provider Role Specialty Phone number    Hortencia Marcus MD [facility] Internal Medicine [phone]

## 2019-11-06 ENCOUNTER — ANTICOAGULATION THERAPY VISIT (OUTPATIENT)
Dept: ANTICOAGULATION | Facility: CLINIC | Age: 49
End: 2019-11-06
Payer: COMMERCIAL

## 2019-11-06 DIAGNOSIS — Z79.01 LONG TERM CURRENT USE OF ANTICOAGULANTS WITH INR GOAL OF 2.0-3.0: ICD-10-CM

## 2019-11-06 DIAGNOSIS — D68.9 COAGULOPATHY (H): ICD-10-CM

## 2019-11-06 LAB — INR POINT OF CARE: 2.2 (ref 0.86–1.14)

## 2019-11-06 PROCEDURE — 36416 COLLJ CAPILLARY BLOOD SPEC: CPT

## 2019-11-06 PROCEDURE — 85610 PROTHROMBIN TIME: CPT | Mod: QW

## 2019-11-06 PROCEDURE — 99207 ZZC NO CHARGE NURSE ONLY: CPT

## 2019-11-06 NOTE — PROGRESS NOTES
ANTICOAGULATION FOLLOW-UP CLINIC VISIT    Patient Name:  Renetta Reid  Date:  2019  Contact Type:  Face to Face    SUBJECTIVE:  Patient Findings     Positives:   Change in medications (Z-brook completed yesterday)    Comments:   The patient was assessed for diet, and activity level changes, missed or extra doses, bruising or bleeding, with no problem findings.          Clinical Outcomes     Negatives:   Major bleeding event, Thromboembolic event, Anticoagulation-related hospital admission, Anticoagulation-related ED visit, Anticoagulation-related fatality    Comments:   The patient was assessed for diet, and activity level changes, missed or extra doses, bruising or bleeding, with no problem findings.             OBJECTIVE    INR Protime   Date Value Ref Range Status   2019 2.2 (A) 0.86 - 1.14 Final     Chromogenic Factor 10   Date Value Ref Range Status   2010 60 60 - 140 % Final     Comment:     Therapeutic Range: A Chromogenic Factor 10 level of 20-40% inversely   correlates   with an INR of 2-3 for patients receiving Warfarin. Chromogenic Factor 10   levels below 20% indicate an INR greater than 3, and levels above 40%   indicate   an INR lessthan 2.       ASSESSMENT / PLAN  INR assessment THER    Recheck INR In: 6 WEEKS    INR Location Clinic      Anticoagulation Summary  As of 2019    INR goal:   2.0-3.0   TTR:   75.2 % (3.5 y)   INR used for dosin.2 (2019)   Warfarin maintenance plan:   1.25 mg (2.5 mg x 0.5) every Wed, Fri; 2.5 mg (2.5 mg x 1) all other days   Full warfarin instructions:   1.25 mg every Wed, Fri; 2.5 mg all other days   Weekly warfarin total:   15 mg   Plan last modified:   Elaine Lu RN (2016)   Next INR check:   2019   Priority:   INR   Target end date:       Indications    Long term current use of anticoagulants with INR goal of 2.0-3.0 [Z79.01]  Coagulopathy (H) [D68.9]             Anticoagulation Episode Summary     INR check location:        Preferred lab:       [facility]    Comments:         Anticoagulation Care Providers     Provider Role Specialty Phone number    Hortencia Marcus MD Responsible Internal Medicine [phone]            See the Encounter Report to view Anticoagulation Flowsheet and Dosing Calendar (Go to Encounters tab in chart review, and find the Anticoagulation Therapy Visit)    Dosage adjustment made based on physician directed care plan.    Jennifer Matias RN

## 2019-11-12 ENCOUNTER — OFFICE VISIT (OUTPATIENT)
Dept: INTERNAL MEDICINE | Facility: CLINIC | Age: 49
End: 2019-11-12
Payer: COMMERCIAL

## 2019-11-12 VITALS
WEIGHT: 268.2 LBS | DIASTOLIC BLOOD PRESSURE: 88 MMHG | TEMPERATURE: 97.5 F | OXYGEN SATURATION: 98 % | SYSTOLIC BLOOD PRESSURE: 160 MMHG | HEART RATE: 118 BPM | BODY MASS INDEX: 49.35 KG/M2 | HEIGHT: 62 IN | RESPIRATION RATE: 18 BRPM

## 2019-11-12 DIAGNOSIS — F33.0 MILD EPISODE OF RECURRENT MAJOR DEPRESSIVE DISORDER (H): Primary | ICD-10-CM

## 2019-11-12 DIAGNOSIS — I10 BENIGN ESSENTIAL HYPERTENSION: ICD-10-CM

## 2019-11-12 DIAGNOSIS — F41.9 ANXIETY: ICD-10-CM

## 2019-11-12 PROCEDURE — 99214 OFFICE O/P EST MOD 30 MIN: CPT | Performed by: INTERNAL MEDICINE

## 2019-11-12 PROCEDURE — 96127 BRIEF EMOTIONAL/BEHAV ASSMT: CPT | Performed by: INTERNAL MEDICINE

## 2019-11-12 RX ORDER — AMLODIPINE BESYLATE 5 MG/1
5 TABLET ORAL DAILY
Qty: 90 TABLET | Refills: 2 | Status: SHIPPED | OUTPATIENT
Start: 2019-11-12 | End: 2020-10-13

## 2019-11-12 ASSESSMENT — MIFFLIN-ST. JEOR: SCORE: 1790.83

## 2019-11-12 NOTE — NURSING NOTE
"BP (!) 160/88 (BP Location: Right arm, Patient Position: Sitting, Cuff Size: Adult Regular)   Pulse 118   Temp 97.5  F (36.4  C) (Oral)   Resp 18   Ht 1.568 m (5' 1.75\")   Wt 121.7 kg (268 lb 3.2 oz)   SpO2 98%   BMI 49.45 kg/m      "

## 2019-11-12 NOTE — PROGRESS NOTES
Subjective     Renetta Reid is a 49 year old female who presents to clinic today for the following health issues:    HPI       Hypertension Follow-up  She reports that she was in urgent care twice in the past month and was told her blood pressures were okay at that time but we do not have documentation of them.    Do you check your blood pressure regularly outside of the clinic? No     Are you following a low salt diet? Yes    Are your blood pressures ever more than 140 on the top number (systolic) OR more   than 90 on the bottom number (diastolic), for example 140/90? Yes    Depression and Anxiety Follow-Up  Main issue for her visit today is related to her mood.  We discussed this in June, she was feeling some minor depression, anxiety.  We talked about her going through the Can Do program, but she forgot about checking into that.  She reports she has had more anxiety, was worse about a month ago but is starting to actually feel little bit better.  She did start a new job in July which she felt was stressful although she was glad to have left her old job.  She now feels she is starting to be a little more comfortable with the job and that may be helping her feel better.      How are you doing with your depression since your last visit? No change    How are you doing with your anxiety since your last visit?  No change    Are you having other symptoms that might be associated with depression or anxiety? No    Have you had a significant life event? No     Do you have any concerns with your use of alcohol or other drugs? No          Hyperlipidemia Follow-Up      Are you having any of the following symptoms? (Select all that apply)  No complaints of shortness of breath, chest pain or pressure.  No increased sweating or nausea with activity.  No left-sided neck or arm pain.  No complaints of pain in calves when walking 1-2 blocks.    Are you regularly taking any medication or supplement to lower your cholesterol?    No    Are you having muscle aches or other side effects that you think could be caused by your cholesterol lowering medication?  No      Social History     Tobacco Use     Smoking status: Never Smoker     Smokeless tobacco: Never Used   Substance Use Topics     Alcohol use: Yes     Comment: socially     Drug use: No     PHQ 6/4/2019 11/16/2019   PHQ-9 Total Score 9 10   Q9: Thoughts of better off dead/self-harm past 2 weeks Not at all Not at all     AV-7 SCORE 6/4/2019 11/16/2019   Total Score 7 10           Suicide Assessment Five-step Evaluation and Treatment (SAFE-T)      How many servings of fruits and vegetables do you eat daily?  0-1    On average, how many sweetened beverages do you drink each day (soda, juice, sweet tea, etc)?   0    How many days per week do you miss taking your medication? 0    Patient Active Problem List   Diagnosis     Calculus of kidney     CARDIOVASCULAR SCREENING; LDL GOAL LESS THAN 160     Coagulopathy (H)     HSP (Henoch Schonlein purpura) (H)     Gastroesophageal reflux disease without esophagitis     Long term current use of anticoagulants with INR goal of 2.0-3.0     Morbid obesity (H)     Anxiety     Mild episode of recurrent major depressive disorder (H)     Benign essential hypertension     Current Outpatient Medications   Medication Sig Dispense Refill     amLODIPine (NORVASC) 5 MG tablet Take 1 tablet (5 mg) by mouth daily 90 tablet 2     hydrOXYzine (ATARAX) 10 MG tablet Take 1 tablet (10 mg) by mouth At Bedtime 90 tablet 3     omeprazole (PRILOSEC) 20 MG DR capsule Take 1 capsule (20 mg) by mouth daily 90 capsule 3     warfarin ANTICOAGULANT (COUMADIN) 2.5 MG tablet TAKE 1 TAB BY MOUTH DAILY ON SUN, MON, TUES, THURS, SAT AND 1/2 TAB ON WED AND FRI AS DIRECTED 75 tablet 0      Reviewed and updated as needed this visit by Provider         Review of Systems   negative      Objective    BP (!) 160/88 (BP Location: Right arm, Patient Position: Sitting, Cuff Size: Adult  "Regular)   Pulse 118   Temp 97.5  F (36.4  C) (Oral)   Resp 18   Ht 1.568 m (5' 1.75\")   Wt 121.7 kg (268 lb 3.2 oz)   SpO2 98%   BMI 49.45 kg/m    Body mass index is 49.45 kg/m .  Physical Exam     Not examined.         Assessment & Plan     1. Mild episode of recurrent major depressive disorder (H)  Some mild increase in symptoms compared to June but not markedly different, she is still reluctant to consider medication, really does not feel she can get away from her new job for some counseling at.  She will look into the [employer] program but feels a little better so we will elect to monitor her symptoms, if she starts feeling worse, may consider some medication    2. Benign essential hypertension  Her previous readings have been good, today is a little elevated and she thinks that may be more related to her stress and anxiety.  We will continue current dose and recommend a nurse check again in several weeks  - amLODIPine (NORVASC) 5 MG tablet; Take 1 tablet (5 mg) by mouth daily  Dispense: 90 tablet; Refill: 2    3. Anxiety  As above    20 minutes spent with the patient, >50% of time spent counseling      Return in about 7 months (around 6/12/2020) for Physical Exam.    Hortencia Marcus MD  [facility]        "

## 2019-11-16 PROBLEM — F33.0 MILD EPISODE OF RECURRENT MAJOR DEPRESSIVE DISORDER (H): Status: ACTIVE | Noted: 2019-06-14

## 2019-11-16 ASSESSMENT — ANXIETY QUESTIONNAIRES
2. NOT BEING ABLE TO STOP OR CONTROL WORRYING: SEVERAL DAYS
6. BECOMING EASILY ANNOYED OR IRRITABLE: SEVERAL DAYS
5. BEING SO RESTLESS THAT IT IS HARD TO SIT STILL: NOT AT ALL
7. FEELING AFRAID AS IF SOMETHING AWFUL MIGHT HAPPEN: MORE THAN HALF THE DAYS
GAD7 TOTAL SCORE: 10
1. FEELING NERVOUS, ANXIOUS, OR ON EDGE: NEARLY EVERY DAY
3. WORRYING TOO MUCH ABOUT DIFFERENT THINGS: MORE THAN HALF THE DAYS

## 2019-11-16 ASSESSMENT — PATIENT HEALTH QUESTIONNAIRE - PHQ9
SUM OF ALL RESPONSES TO PHQ QUESTIONS 1-9: 10
5. POOR APPETITE OR OVEREATING: SEVERAL DAYS

## 2019-11-17 ASSESSMENT — ANXIETY QUESTIONNAIRES: GAD7 TOTAL SCORE: 10

## 2019-12-17 DIAGNOSIS — D68.9 COAGULOPATHY (H): ICD-10-CM

## 2019-12-17 RX ORDER — WARFARIN SODIUM 2.5 MG/1
TABLET ORAL
Qty: 75 TABLET | Refills: 0 | Status: SHIPPED | OUTPATIENT
Start: 2019-12-17 | End: 2020-03-13

## 2019-12-17 NOTE — TELEPHONE ENCOUNTER
"Requested Prescriptions   Pending Prescriptions Disp Refills     warfarin ANTICOAGULANT (COUMADIN) 2.5 MG tablet [Pharmacy Med Name:  Last Written Prescription Date:  9/25/2019  Last Fill Quantity: 75,  # refills: 0   Last office visit: 11/12/2019 with prescribing provider:     Future Office Visit:   WARFARIN SODIUM 2.5 MG TABLET] 75 tablet 0     Sig: TAKE 1 TAB BY MOUTH DAILY ON SUN, MON, TUES, THURS, SAT AND 1/2 TAB ON WED AND FRI AS DIRECTED       Vitamin K Antagonists Failed - 12/17/2019  5:19 AM        Failed - INR is within goal in the past 6 weeks     Confirm INR is within goal in the past 6 weeks.     Recent Labs   Lab Test 11/06/19   INR 2.2*                       Passed - Recent (12 mo) or future (30 days) visit within the authorizing provider's specialty     Patient has had an office visit with the authorizing provider or a provider within the authorizing providers department within the previous 12 mos or has a future within next 30 days. See \"Patient Info\" tab in inbasket, or \"Choose Columns\" in Meds & Orders section of the refill encounter.              Passed - Medication is active on med list        Passed - Patient is 18 years of age or older        Passed - Patient is not pregnant        Passed - No positive pregnancy on file in past 12 months        "

## 2019-12-17 NOTE — TELEPHONE ENCOUNTER
Warfarin dosing is managed by INR Clinic.  Prescription approved per [facility] Refill Protocol.  Jennifer Matias RN

## 2019-12-18 ENCOUNTER — TELEPHONE (OUTPATIENT)
Dept: INTERNAL MEDICINE | Facility: CLINIC | Age: 49
End: 2019-12-18

## 2019-12-18 ENCOUNTER — ANTICOAGULATION THERAPY VISIT (OUTPATIENT)
Dept: ANTICOAGULATION | Facility: CLINIC | Age: 49
End: 2019-12-18
Payer: COMMERCIAL

## 2019-12-18 DIAGNOSIS — Z79.01 LONG TERM CURRENT USE OF ANTICOAGULANTS WITH INR GOAL OF 2.0-3.0: ICD-10-CM

## 2019-12-18 DIAGNOSIS — D68.9 COAGULOPATHY (H): ICD-10-CM

## 2019-12-18 DIAGNOSIS — D68.9 COAGULOPATHY (H): Primary | ICD-10-CM

## 2019-12-18 LAB — INR POINT OF CARE: 2.1 (ref 0.86–1.14)

## 2019-12-18 PROCEDURE — 36416 COLLJ CAPILLARY BLOOD SPEC: CPT

## 2019-12-18 PROCEDURE — 99207 ZZC NO CHARGE NURSE ONLY: CPT

## 2019-12-18 PROCEDURE — 85610 PROTHROMBIN TIME: CPT | Mod: QW

## 2019-12-18 NOTE — PROGRESS NOTES
ANTICOAGULATION FOLLOW-UP CLINIC VISIT    Patient Name:  Renetta Reid  Date:  2019  Contact Type:  Face to Face    SUBJECTIVE:  Patient Findings     Comments:   The patient was assessed for diet, medication, and activity level changes, missed or extra doses, bruising or bleeding, with no problem findings.          Clinical Outcomes     Negatives:   Major bleeding event, Thromboembolic event, Anticoagulation-related hospital admission, Anticoagulation-related ED visit, Anticoagulation-related fatality    Comments:   The patient was assessed for diet, medication, and activity level changes, missed or extra doses, bruising or bleeding, with no problem findings.             OBJECTIVE    INR Protime   Date Value Ref Range Status   2019 2.1 (A) 0.86 - 1.14 Final     Chromogenic Factor 10   Date Value Ref Range Status   2010 60 60 - 140 % Final     Comment:     Therapeutic Range: A Chromogenic Factor 10 level of 20-40% inversely   correlates   with an INR of 2-3 for patients receiving Warfarin. Chromogenic Factor 10   levels below 20% indicate an INR greater than 3, and levels above 40%   indicate   an INR lessthan 2.       ASSESSMENT / PLAN  INR assessment THER    Recheck INR In: 6 WEEKS    INR Location Clinic      Anticoagulation Summary  As of 2019    INR goal:   2.0-3.0   TTR:   66.4 % (9.6 mo)   INR used for dosin.1 (2019)   Warfarin maintenance plan:   1.25 mg (2.5 mg x 0.5) every Wed, Fri; 2.5 mg (2.5 mg x 1) all other days   Full warfarin instructions:   1.25 mg every Wed, Fri; 2.5 mg all other days   Weekly warfarin total:   15 mg   No change documented:   Jennifer Matias RN   Plan last modified:   Elaine Lu RN (2016)   Next INR check:   2020   Priority:   INR   Target end date:       Indications    Long term current use of anticoagulants with INR goal of 2.0-3.0 [Z79.01]  Coagulopathy (H) [D68.9]             Anticoagulation Episode Summary     INR check  location:       Preferred lab:       Send INR reminders to:   PATRICIO CLEMENTE    Comments:         Anticoagulation Care Providers     Provider Role Specialty Phone number    Hortencia Marcus MD Responsible Internal Medicine [phone]            See the Encounter Report to view Anticoagulation Flowsheet and Dosing Calendar (Go to Encounters tab in chart review, and find the Anticoagulation Therapy Visit)    Dosage adjustment made based on physician directed care plan.    Jennifer Matias RN

## 2019-12-18 NOTE — TELEPHONE ENCOUNTER
For insurance purposes, an Anticoagulation Clinic referral is needed.  Please sign order and route message back to [facility].

## 2020-01-29 ENCOUNTER — ANTICOAGULATION THERAPY VISIT (OUTPATIENT)
Dept: ANTICOAGULATION | Facility: CLINIC | Age: 50
End: 2020-01-29
Payer: COMMERCIAL

## 2020-01-29 DIAGNOSIS — Z79.01 LONG TERM CURRENT USE OF ANTICOAGULANTS WITH INR GOAL OF 2.0-3.0: ICD-10-CM

## 2020-01-29 DIAGNOSIS — D68.9 COAGULOPATHY (H): ICD-10-CM

## 2020-01-29 LAB — INR POINT OF CARE: 1.6 (ref 0.86–1.14)

## 2020-01-29 PROCEDURE — 85610 PROTHROMBIN TIME: CPT | Mod: QW

## 2020-01-29 PROCEDURE — 99207 ZZC NO CHARGE NURSE ONLY: CPT

## 2020-01-29 PROCEDURE — 36416 COLLJ CAPILLARY BLOOD SPEC: CPT

## 2020-01-29 NOTE — LETTER
March 11, 2020      Renetta Reid  [address] [address] [facility] [id number]      Dear Renetta,    We are contacting you because our records show you were due for an INR.      There are potentially serious risks when taking warfarin without careful monitoring, and we want to make sure you are safely managed.    Please call the INR clinic at [phone] and we will be happy to help you schedule an appointment.  If there has been a change in your care, or other concerns, please let us know so we can help and/or update our records.    Sincerely,        [facility]

## 2020-01-29 NOTE — PROGRESS NOTES
ANTICOAGULATION FOLLOW-UP CLINIC VISIT    Patient Name:  Renetta Reid  Date:  2020  Contact Type:  Face to Face    SUBJECTIVE:  Patient Findings     Positives:   Change in diet/appetite (has been eating a lot more greens (salads) and would like to continue doing this.)    Comments:   Maintenance dose increased by 8% today so patient is able to continue eating salads.  The patient was assessed for medication, and activity level changes, missed or extra doses, bruising or bleeding, with no problem findings.          Clinical Outcomes     Negatives:   Major bleeding event, Thromboembolic event, Anticoagulation-related hospital admission, Anticoagulation-related ED visit, Anticoagulation-related fatality    Comments:   Maintenance dose increased by 8% today so patient is able to continue eating salads.  The patient was assessed for medication, and activity level changes, missed or extra doses, bruising or bleeding, with no problem findings.             OBJECTIVE    INR Protime   Date Value Ref Range Status   2020 1.6 (A) 0.86 - 1.14 Final     Chromogenic Factor 10   Date Value Ref Range Status   2010 60 60 - 140 % Final     Comment:     Therapeutic Range: A Chromogenic Factor 10 level of 20-40% inversely   correlates   with an INR of 2-3 for patients receiving Warfarin. Chromogenic Factor 10   levels below 20% indicate an INR greater than 3, and levels above 40%   indicate   an INR lessthan 2.       ASSESSMENT / PLAN  INR assessment SUB    Recheck INR In: 3 WEEKS    INR Location Clinic      Anticoagulation Summary  As of 2020    INR goal:   2.0-3.0   TTR:   60.5 % (11 mo)   Prior goal:   2.0-3.0   INR used for dosin.6! (2020)   Warfarin maintenance plan:   1.25 mg (2.5 mg x 0.5) every Fri; 2.5 mg (2.5 mg x 1) all other days   Full warfarin instructions:   1.25 mg every Fri; 2.5 mg all other days   Weekly warfarin total:   16.25 mg   Plan last modified:   Jennifer Matias RN (2020)    Next INR check:   2/19/2020   Priority:   INR   Target end date:   Indefinite    Indications    Long term current use of anticoagulants with INR goal of 2.0-3.0 [Z79.01]  Coagulopathy (H) [D68.9]             Anticoagulation Episode Summary     INR check location:       Preferred lab:       Send INR reminders to:   [facility]    Comments:         Anticoagulation Care Providers     Provider Role Specialty Phone number    Hortencia Marcus MD Referring Internal Medicine [phone]            See the Encounter Report to view Anticoagulation Flowsheet and Dosing Calendar (Go to Encounters tab in chart review, and find the Anticoagulation Therapy Visit)    Dosage adjustment made based on physician directed care plan.    Jennifer Matias RN

## 2020-03-13 ENCOUNTER — TELEPHONE (OUTPATIENT)
Dept: INTERNAL MEDICINE | Facility: CLINIC | Age: 50
End: 2020-03-13

## 2020-03-13 NOTE — TELEPHONE ENCOUNTER
Patient requesting INR refill, filled for 2 weeks.    Patient called and message left that no refills will be approved until an INR is completed.    Concepción Hutton RN  Anticoagulation Clinic

## 2020-03-25 ENCOUNTER — ANTICOAGULATION THERAPY VISIT (OUTPATIENT)
Dept: ANTICOAGULATION | Facility: CLINIC | Age: 50
End: 2020-03-25

## 2020-03-25 DIAGNOSIS — Z79.01 LONG TERM CURRENT USE OF ANTICOAGULANTS WITH INR GOAL OF 2.0-3.0: Primary | ICD-10-CM

## 2020-03-25 DIAGNOSIS — D68.9 COAGULOPATHY (H): ICD-10-CM

## 2020-03-25 DIAGNOSIS — Z79.01 LONG TERM CURRENT USE OF ANTICOAGULANTS WITH INR GOAL OF 2.0-3.0: ICD-10-CM

## 2020-03-25 LAB
CAPILLARY BLOOD COLLECTION: NORMAL
INR PPP: 1.7 (ref 0.86–1.14)

## 2020-03-25 PROCEDURE — 99207 ZZC NO CHARGE NURSE ONLY: CPT | Performed by: INTERNAL MEDICINE

## 2020-03-25 PROCEDURE — 36416 COLLJ CAPILLARY BLOOD SPEC: CPT | Performed by: INTERNAL MEDICINE

## 2020-03-25 PROCEDURE — 85610 PROTHROMBIN TIME: CPT | Performed by: INTERNAL MEDICINE

## 2020-03-25 NOTE — PROGRESS NOTES
Anticoagulation Management    Unable to reach patient today.    Today's INR result of 1.7 is subtherapeutic (goal INR of 2.0-3.0).  Result received from: Clinic Lab    Follow up required to confirm warfarin dose taken and assess for changes    Left message to take a booster dose of warfarin,  5 mg tonight.      Anticoagulation clinic to follow up    Elaine Lu RN  Transfer return call to: [phone]  ANTICOAGULATION FOLLOW-UP CLINIC VISIT    Patient Name:  Renetta Reid  Date:  3/26/2020  Contact Type:  Telephone/ Called patient, reports diet change, no other changes. Orders discussed with patient.    SUBJECTIVE:  Patient Findings     Positives:   Change in diet/appetite (Patient reports has not had as many green veggies as usual d/t availability)    Comments:   The patient was assessed for medication and activity level changes, missed or extra doses, bruising or bleeding, with no problem findings.          Clinical Outcomes     Comments:   The patient was assessed for medication and activity level changes, missed or extra doses, bruising or bleeding, with no problem findings.             OBJECTIVE    INR   Date Value Ref Range Status   2020 1.70 (H) 0.86 - 1.14 Final     Comment:     This test is intended for monitoring Coumadin therapy.  Results are not   accurate in patients with prolonged INR due to factor deficiency.       Chromogenic Factor 10   Date Value Ref Range Status   2010 60 60 - 140 % Final     Comment:     Therapeutic Range: A Chromogenic Factor 10 level of 20-40% inversely   correlates   with an INR of 2-3 for patients receiving Warfarin. Chromogenic Factor 10   levels below 20% indicate an INR greater than 3, and levels above 40%   indicate   an INR lessthan 2.       ASSESSMENT / PLAN  INR assessment SUB    Recheck INR In: 2 WEEKS    INR Location Clinic      Anticoagulation Summary  As of 3/25/2020    INR goal:   2.0-3.0   TTR:   51.7 % (1 y)   INR used for dosin.70!  (3/25/2020)   Warfarin maintenance plan:   1.25 mg (2.5 mg x 0.5) every Fri; 2.5 mg (2.5 mg x 1) all other days   Full warfarin instructions:   3/25: 5 mg; 3/27: 2.5 mg; 4/3: 2.5 mg; Otherwise 1.25 mg every Fri; 2.5 mg all other days   Weekly warfarin total:   16.25 mg   Plan last modified:   Jennifer Matias RN (1/29/2020)   Next INR check:   4/8/2020   Priority:   INR   Target end date:   Indefinite    Indications    Long term current use of anticoagulants with INR goal of 2.0-3.0 [Z79.01]  Coagulopathy (H) [D68.9]             Anticoagulation Episode Summary     INR check location:       Preferred lab:       Send INR reminders to:   [facility]    Comments:         Anticoagulation Care Providers     Provider Role Specialty Phone number    Hortencia Marcus MD Responsible Internal Medicine [phone]            See the Encounter Report to view Anticoagulation Flowsheet and Dosing Calendar (Go to Encounters tab in chart review, and find the Anticoagulation Therapy Visit)    Dosage adjustment made based on physician directed care plan.    Elaine Lu RN

## 2020-03-26 RX ORDER — WARFARIN SODIUM 2.5 MG/1
TABLET ORAL
Qty: 90 TABLET | Refills: 0 | Status: SHIPPED | OUTPATIENT
Start: 2020-03-26 | End: 2020-06-22

## 2020-04-08 ENCOUNTER — ANTICOAGULATION THERAPY VISIT (OUTPATIENT)
Dept: ANTICOAGULATION | Facility: CLINIC | Age: 50
End: 2020-04-08

## 2020-04-08 DIAGNOSIS — Z79.01 LONG TERM CURRENT USE OF ANTICOAGULANTS WITH INR GOAL OF 2.0-3.0: ICD-10-CM

## 2020-04-08 DIAGNOSIS — D68.9 COAGULOPATHY (H): ICD-10-CM

## 2020-04-08 LAB
CAPILLARY BLOOD COLLECTION: NORMAL
INR PPP: 2.5 (ref 0.86–1.14)

## 2020-04-08 PROCEDURE — 85610 PROTHROMBIN TIME: CPT | Performed by: INTERNAL MEDICINE

## 2020-04-08 PROCEDURE — 36416 COLLJ CAPILLARY BLOOD SPEC: CPT | Performed by: INTERNAL MEDICINE

## 2020-04-08 PROCEDURE — 99207 ZZC NO CHARGE NURSE ONLY: CPT | Performed by: INTERNAL MEDICINE

## 2020-04-08 NOTE — PROGRESS NOTES
ANTICOAGULATION FOLLOW-UP CLINIC VISIT    Patient Name:  Renetta Reid  Date:  4/8/2020  Contact Type:  Telephone/ Called patient, reports no changes. Orders discussed with patient.     SUBJECTIVE:  Patient Findings     Comments:   The patient was assessed for diet, medication, and activity level changes, missed or extra doses, bruising or bleeding, with no problem findings.  Patient would like to go back to 1.25 mg on warfarin on Fridays, states she has trouble with blood vessels breaking in her eyes when INR is higher. Patient agreed to have INR checked in 2 weeks to make sure her level was not drop too low again.         Clinical Outcomes     Negatives:   Major bleeding event, Thromboembolic event, Anticoagulation-related hospital admission, Anticoagulation-related ED visit, Anticoagulation-related fatality    Comments:   The patient was assessed for diet, medication, and activity level changes, missed or extra doses, bruising or bleeding, with no problem findings.  Patient would like to go back to 1.25 mg on warfarin on Fridays, states she has trouble with blood vessels breaking in her eyes when INR is higher. Patient agreed to have INR checked in 2 weeks to make sure her level was not drop too low again.            OBJECTIVE    INR   Date Value Ref Range Status   04/08/2020 2.50 (H) 0.86 - 1.14 Final     Comment:     This test is intended for monitoring Coumadin therapy.  Results are not   accurate in patients with prolonged INR due to factor deficiency.       Chromogenic Factor 10   Date Value Ref Range Status   08/05/2010 60 60 - 140 % Final     Comment:     Therapeutic Range: A Chromogenic Factor 10 level of 20-40% inversely   correlates   with an INR of 2-3 for patients receiving Warfarin. Chromogenic Factor 10   levels below 20% indicate an INR greater than 3, and levels above 40%   indicate   an INR lessthan 2.       ASSESSMENT / PLAN  INR assessment THER    Recheck INR In: 2 WEEKS    INR Location Outside  lab      Anticoagulation Summary  As of 2020    INR goal:   2.0-3.0   TTR:   50.4 % (1 y)   INR used for dosin.50 (2020)   Warfarin maintenance plan:   1.25 mg (2.5 mg x 0.5) every Fri; 2.5 mg (2.5 mg x 1) all other days   Full warfarin instructions:   1.25 mg every Fri; 2.5 mg all other days   Weekly warfarin total:   16.25 mg   Plan last modified:   Elaine Lu RN (2020)   Next INR check:   2020   Priority:   INR   Target end date:   Indefinite    Indications    Long term current use of anticoagulants with INR goal of 2.0-3.0 [Z79.01]  Coagulopathy (H) [D68.9]             Anticoagulation Episode Summary     INR check location:       Preferred lab:       Send INR reminders to:   [facility]    Comments:         Anticoagulation Care Providers     Provider Role Specialty Phone number    Hortencia Marcus MD [facility] Internal Medicine [phone]            See the Encounter Report to view Anticoagulation Flowsheet and Dosing Calendar (Go to Encounters tab in chart review, and find the Anticoagulation Therapy Visit)    Dosage adjustment made based on physician directed care plan.    Elaine Lu RN

## 2020-04-21 ENCOUNTER — ANTICOAGULATION THERAPY VISIT (OUTPATIENT)
Dept: ANTICOAGULATION | Facility: CLINIC | Age: 50
End: 2020-04-21

## 2020-04-21 DIAGNOSIS — D68.9 COAGULOPATHY (H): ICD-10-CM

## 2020-04-21 DIAGNOSIS — Z79.01 LONG TERM CURRENT USE OF ANTICOAGULANTS WITH INR GOAL OF 2.0-3.0: ICD-10-CM

## 2020-04-21 LAB — INR PPP: 2.4 (ref 0.86–1.14)

## 2020-04-21 PROCEDURE — 36416 COLLJ CAPILLARY BLOOD SPEC: CPT | Performed by: INTERNAL MEDICINE

## 2020-04-21 PROCEDURE — 85610 PROTHROMBIN TIME: CPT | Performed by: INTERNAL MEDICINE

## 2020-04-21 PROCEDURE — 99207 ZZC NO CHARGE NURSE ONLY: CPT | Performed by: INTERNAL MEDICINE

## 2020-04-21 NOTE — PROGRESS NOTES
ANTICOAGULATION FOLLOW-UP CLINIC VISIT    Patient Name:  Renetta Reid  Date:  2020  Contact Type:  Telephone/ discussed with patient    SUBJECTIVE:  Patient Findings     Comments:   The patient was assessed for diet, medication, and activity level changes, missed or extra doses, bruising or bleeding, with no problem findings.  No longer having problems with burst blood vessels in her eye.          Clinical Outcomes     Negatives:   Major bleeding event, Thromboembolic event, Anticoagulation-related hospital admission, Anticoagulation-related ED visit, Anticoagulation-related fatality    Comments:   The patient was assessed for diet, medication, and activity level changes, missed or extra doses, bruising or bleeding, with no problem findings.  No longer having problems with burst blood vessels in her eye.             OBJECTIVE    INR   Date Value Ref Range Status   2020 2.40 (H) 0.86 - 1.14 Final     Comment:     This test is intended for monitoring Coumadin therapy.  Results are not   accurate in patients with prolonged INR due to factor deficiency.       Chromogenic Factor 10   Date Value Ref Range Status   2010 60 60 - 140 % Final     Comment:     Therapeutic Range: A Chromogenic Factor 10 level of 20-40% inversely   correlates   with an INR of 2-3 for patients receiving Warfarin. Chromogenic Factor 10   levels below 20% indicate an INR greater than 3, and levels above 40%   indicate   an INR lessthan 2.       ASSESSMENT / PLAN  INR assessment THER    Recheck INR In: 4 WEEKS    INR Location Clinic      Anticoagulation Summary  As of 2020    INR goal:   2.0-3.0   TTR:   50.4 % (1 y)   INR used for dosin.40 (2020)   Warfarin maintenance plan:   1.25 mg (2.5 mg x 0.5) every Fri; 2.5 mg (2.5 mg x 1) all other days   Full warfarin instructions:   1.25 mg every Fri; 2.5 mg all other days   Weekly warfarin total:   16.25 mg   No change documented:   Jennifer Matias, RN   Plan last modified:    Elaine Lu RN (4/8/2020)   Next INR check:   5/19/2020   Priority:   INR   Target end date:   Indefinite    Indications    Long term current use of anticoagulants with INR goal of 2.0-3.0 [Z79.01]  Coagulopathy (H) [D68.9]             Anticoagulation Episode Summary     INR check location:       Preferred lab:       Send INR reminders to:   [facility]    Comments:         Anticoagulation Care Providers     Provider Role Specialty Phone number    Hortencia Marcus MD [facility] Internal Medicine [phone]            See the Encounter Report to view Anticoagulation Flowsheet and Dosing Calendar (Go to Encounters tab in chart review, and find the Anticoagulation Therapy Visit)    Dosage adjustment made based on physician directed care plan.    Jennifer Matias RN

## 2020-06-01 ENCOUNTER — ANTICOAGULATION THERAPY VISIT (OUTPATIENT)
Dept: ANTICOAGULATION | Facility: CLINIC | Age: 50
End: 2020-06-01

## 2020-06-01 DIAGNOSIS — Z79.01 LONG TERM CURRENT USE OF ANTICOAGULANTS WITH INR GOAL OF 2.0-3.0: ICD-10-CM

## 2020-06-01 DIAGNOSIS — D68.9 COAGULOPATHY (H): ICD-10-CM

## 2020-06-01 LAB
CAPILLARY BLOOD COLLECTION: NORMAL
INR PPP: 3.7 (ref 0.86–1.14)

## 2020-06-01 PROCEDURE — 85610 PROTHROMBIN TIME: CPT | Performed by: INTERNAL MEDICINE

## 2020-06-01 PROCEDURE — 99207 ZZC NO CHARGE NURSE ONLY: CPT | Performed by: INTERNAL MEDICINE

## 2020-06-01 PROCEDURE — 36416 COLLJ CAPILLARY BLOOD SPEC: CPT | Performed by: INTERNAL MEDICINE

## 2020-06-01 NOTE — PROGRESS NOTES
ANTICOAGULATION FOLLOW-UP CLINIC VISIT    Patient Name:  Renetta Reid  Date:  6/1/2020  Contact Type:  Telephone/ Called patient, reports med change, tooth abscess, denies any other changes. Orders discussed with patient.     SUBJECTIVE:  Patient Findings     Positives:   Signs/symptoms of bleeding (Patient report menstrual is heavier than normal. ), Change in medications (Amoxicillin from dentist, should complete tonight, last dose of ibuprofen 3 days ago. Patient had abcess tooth. )    Comments:   The patient was assessed for diet and activity level changes, missed or extra doses, bruising or bleeding, with no problem findings.  Discussed with patient the importance of calling the INR Clinic with any medication changes including over the counter.         Clinical Outcomes     Comments:   The patient was assessed for diet and activity level changes, missed or extra doses, bruising or bleeding, with no problem findings.  Discussed with patient the importance of calling the INR Clinic with any medication changes including over the counter.            OBJECTIVE    Recent labs: (last 7 days)     06/01/20  1354   INR 3.70*       ASSESSMENT / PLAN  INR assessment SUPRA    Recheck INR In: 10 DAYS    INR Location Outside lab      Anticoagulation Summary  As of 6/1/2020    INR goal:   2.0-3.0   TTR:   44.3 % (1 y)   INR used for dosing:   3.70! (6/1/2020)   Warfarin maintenance plan:   1.25 mg (2.5 mg x 0.5) every Fri; 2.5 mg (2.5 mg x 1) all other days   Full warfarin instructions:   6/1: Hold; 6/2: 1.25 mg; Otherwise 1.25 mg every Fri; 2.5 mg all other days   Weekly warfarin total:   16.25 mg   Plan last modified:   Elaine Lu RN (4/8/2020)   Next INR check:   6/11/2020   Priority:   INR   Target end date:   Indefinite    Indications    Long term current use of anticoagulants with INR goal of 2.0-3.0 [Z79.01]  Coagulopathy (H) [D68.9]             Anticoagulation Episode Summary     INR check location:        Preferred lab:       Send INR reminders to:   [facility]    Comments:         Anticoagulation Care Providers     Provider Role Specialty Phone number    Hortencia Marcus MD Responsible Internal Medicine [phone]            See the Encounter Report to view Anticoagulation Flowsheet and Dosing Calendar (Go to Encounters tab in chart review, and find the Anticoagulation Therapy Visit)    Dosage adjustment made based on physician directed care plan.    Elaine Lu RN

## 2020-06-09 DIAGNOSIS — K21.9 GASTROESOPHAGEAL REFLUX DISEASE WITHOUT ESOPHAGITIS: ICD-10-CM

## 2020-06-09 NOTE — TELEPHONE ENCOUNTER
Pending Prescriptions:                       Disp   Refills    omeprazole (PRILOSEC) 20 MG DR capsule [P*90 cap*1            Sig: TAKE 1 CAPSULE BY MOUTH EVERY DAY    Prescription approved per [facility] Refill Protocol.

## 2020-06-15 ENCOUNTER — ANTICOAGULATION THERAPY VISIT (OUTPATIENT)
Dept: ANTICOAGULATION | Facility: CLINIC | Age: 50
End: 2020-06-15

## 2020-06-15 DIAGNOSIS — Z79.01 LONG TERM CURRENT USE OF ANTICOAGULANTS WITH INR GOAL OF 2.0-3.0: ICD-10-CM

## 2020-06-15 DIAGNOSIS — D68.9 COAGULOPATHY (H): ICD-10-CM

## 2020-06-15 LAB
CAPILLARY BLOOD COLLECTION: NORMAL
INR PPP: 2.1 (ref 0.86–1.14)

## 2020-06-15 PROCEDURE — 85610 PROTHROMBIN TIME: CPT | Performed by: INTERNAL MEDICINE

## 2020-06-15 PROCEDURE — 36416 COLLJ CAPILLARY BLOOD SPEC: CPT | Performed by: INTERNAL MEDICINE

## 2020-06-15 PROCEDURE — 99207 ZZC NO CHARGE NURSE ONLY: CPT | Performed by: INTERNAL MEDICINE

## 2020-06-15 NOTE — PROGRESS NOTES
Left message to call [phone]. Please transfer call to Elaine before 5:00 PM, [phone]  Elaine Lu RN

## 2020-06-15 NOTE — PROGRESS NOTES
Anticoagulation Management    Unable to reach patient today.    Today's INR result of 2.1 is therapeutic (goal INR of 2.0-3.0).  Result received from: Clinic Lab    Follow up required to confirm warfarin dose taken and assess for changes    Left message to continue current dose of warfarin 2.5 mg tonight.       Anticoagulation clinic to follow up    Elaine Lu RN

## 2020-06-16 NOTE — PROGRESS NOTES
ANTICOAGULATION FOLLOW-UP CLINIC VISIT    Patient Name:  Renetta Reid  Date:  2020  Contact Type:  Telephone/ discussed with patient    SUBJECTIVE:  Patient Findings     Positives:   Change in medications (No longer taking amoxicillin and ibuprofen.  Her tooth is feeling better now.)    Comments:   The patient was assessed for diet, medication, and activity level changes, missed or extra doses, bruising or bleeding, with no problem findings.          Clinical Outcomes     Negatives:   Major bleeding event, Thromboembolic event, Anticoagulation-related hospital admission, Anticoagulation-related ED visit, Anticoagulation-related fatality    Comments:   The patient was assessed for diet, medication, and activity level changes, missed or extra doses, bruising or bleeding, with no problem findings.             OBJECTIVE    Recent labs: (last 7 days)     06/15/20  1526   INR 2.10*       ASSESSMENT / PLAN  INR assessment THER    Recheck INR In: 4 WEEKS    INR Location Clinic      Anticoagulation Summary  As of 6/15/2020    INR goal:   2.0-3.0   TTR:   42.5 % (1 y)   INR used for dosin.10 (6/15/2020)   Warfarin maintenance plan:   1.25 mg (2.5 mg x 0.5) every Fri; 2.5 mg (2.5 mg x 1) all other days   Full warfarin instructions:   1.25 mg every Fri; 2.5 mg all other days   Weekly warfarin total:   16.25 mg   Plan last modified:   Elaine Lu RN (2020)   Next INR check:   2020   Priority:   INR   Target end date:   Indefinite    Indications    Long term current use of anticoagulants with INR goal of 2.0-3.0 [Z79.01]  Coagulopathy (H) [D68.9]             Anticoagulation Episode Summary     INR check location:       Preferred lab:       Send INR reminders to:   [facility]    Comments:         Anticoagulation Care Providers     Provider Role Specialty Phone number    Hortencia Marcus MD [facility] Internal Medicine [phone]            See the Encounter Report to view Anticoagulation  Flowsheet and Dosing Calendar (Go to Encounters tab in chart review, and find the Anticoagulation Therapy Visit)    Dosage adjustment made based on physician directed care plan.    Jennifer Matias RN

## 2020-06-21 DIAGNOSIS — Z79.01 LONG TERM CURRENT USE OF ANTICOAGULANTS WITH INR GOAL OF 2.0-3.0: Primary | ICD-10-CM

## 2020-06-21 DIAGNOSIS — D68.9 COAGULOPATHY (H): ICD-10-CM

## 2020-06-22 RX ORDER — WARFARIN SODIUM 2.5 MG/1
TABLET ORAL
Qty: 90 TABLET | Refills: 0 | Status: SHIPPED | OUTPATIENT
Start: 2020-06-22 | End: 2020-09-16

## 2020-06-22 NOTE — TELEPHONE ENCOUNTER
Last INR: 06/15/20=2.1  Next INR: 07/13/20    Prescription approved per [facility] Refill Protocol.    Maryann Gutierrez RN

## 2020-06-23 DIAGNOSIS — L50.9 URTICARIA: ICD-10-CM

## 2020-06-23 RX ORDER — HYDROXYZINE HYDROCHLORIDE 10 MG/1
TABLET, FILM COATED ORAL
Qty: 90 TABLET | Refills: 1 | Status: SHIPPED | OUTPATIENT
Start: 2020-06-23 | End: 2020-11-16

## 2020-06-23 NOTE — TELEPHONE ENCOUNTER
Pending Prescriptions:                       Disp   Refills    hydrOXYzine (ATARAX) 10 MG tablet [Pharma*90 tab*1            Sig: TAKE 1 TABLET BY MOUTH EVERYDAY AT BEDTIME    Prescription approved per [facility] Refill Protocol.

## 2020-07-16 ENCOUNTER — TELEPHONE (OUTPATIENT)
Dept: NURSING | Facility: CLINIC | Age: 50
End: 2020-07-16

## 2020-07-16 NOTE — LETTER
July 21, 2020      Renetta Reid  [address] [address] [address]      Dear Renetta,    We are contacting you because our records show you were due for an INR on July 13th.      There are potentially serious risks when taking warfarin without careful monitoring, and we want to make sure you are safely managed.    Please call the INR clinic at [phone] and we will be happy to help you schedule an appointment.  If there has been a change in your care, or other concerns, please let us know so we can help and/or update our records.    Sincerely,        Dr. Hortencia Marcus  INR Clinic

## 2020-07-16 NOTE — TELEPHONE ENCOUNTER
Renetta Reid is overdue for INR check.      Left message for patient to call and schedule INR check as soon as possible. If returning call, please schedule.    Patient missed her INR appt. On 07/13/20.    Maryann Gutierrez RN

## 2020-08-04 ENCOUNTER — ANTICOAGULATION THERAPY VISIT (OUTPATIENT)
Dept: ANTICOAGULATION | Facility: CLINIC | Age: 50
End: 2020-08-04

## 2020-08-04 DIAGNOSIS — Z79.01 LONG TERM CURRENT USE OF ANTICOAGULANTS WITH INR GOAL OF 2.0-3.0: ICD-10-CM

## 2020-08-04 DIAGNOSIS — D68.9 COAGULOPATHY (H): ICD-10-CM

## 2020-08-04 LAB
CAPILLARY BLOOD COLLECTION: NORMAL
INR PPP: 2.5 (ref 0.86–1.14)

## 2020-08-04 PROCEDURE — 85610 PROTHROMBIN TIME: CPT | Performed by: INTERNAL MEDICINE

## 2020-08-04 PROCEDURE — 99207 ZZC NO CHARGE NURSE ONLY: CPT | Performed by: INTERNAL MEDICINE

## 2020-08-04 PROCEDURE — 36416 COLLJ CAPILLARY BLOOD SPEC: CPT | Performed by: INTERNAL MEDICINE

## 2020-08-04 NOTE — PROGRESS NOTES
ANTICOAGULATION FOLLOW-UP CLINIC VISIT    Patient Name:  Renetta Reid  Date:  2020  Contact Type:  Telephone/ Called patient, she denies any changes. Orders discussed with patient, she agrees with plan. Lab INR appointment scheduled on 9/15/20    SUBJECTIVE:  Patient Findings     Comments:   The patient was assessed for diet, medication, and activity level changes, missed or extra doses, bruising or bleeding, with no problem findings.          Clinical Outcomes     Negatives:   Major bleeding event, Thromboembolic event, Anticoagulation-related hospital admission, Anticoagulation-related ED visit, Anticoagulation-related fatality    Comments:   The patient was assessed for diet, medication, and activity level changes, missed or extra doses, bruising or bleeding, with no problem findings.             OBJECTIVE    Recent labs: (last 7 days)     20  1455   INR 2.50*       ASSESSMENT / PLAN  INR assessment THER    Recheck INR In: 6 WEEKS    INR Location Outside lab      Anticoagulation Summary  As of 2020    INR goal:   2.0-3.0   TTR:   50.0 % (1 y)   INR used for dosin.50 (2020)   Warfarin maintenance plan:   1.25 mg (2.5 mg x 0.5) every Fri; 2.5 mg (2.5 mg x 1) all other days   Full warfarin instructions:   1.25 mg every Fri; 2.5 mg all other days   Weekly warfarin total:   16.25 mg   No change documented:   Elaine Lu, RN   Plan last modified:   Elaine Lu RN (2020)   Next INR check:   9/15/2020   Priority:   Maintenance   Target end date:   Indefinite    Indications    Long term current use of anticoagulants with INR goal of 2.0-3.0 [Z79.01]  Coagulopathy (H) [D68.9]             Anticoagulation Episode Summary     INR check location:       Preferred lab:       Send INR reminders to:   [facility]    Comments:         Anticoagulation Care Providers     Provider Role Specialty Phone number    Hortencia Marcus MD [facility] Internal Medicine [phone]             See the Encounter Report to view Anticoagulation Flowsheet and Dosing Calendar (Go to Encounters tab in chart review, and find the Anticoagulation Therapy Visit)    Dosage adjustment made based on physician directed care plan.    Elaine Lu RN

## 2020-09-16 DIAGNOSIS — Z79.01 LONG TERM CURRENT USE OF ANTICOAGULANTS WITH INR GOAL OF 2.0-3.0: ICD-10-CM

## 2020-09-16 DIAGNOSIS — D68.9 COAGULOPATHY (H): ICD-10-CM

## 2020-09-16 RX ORDER — WARFARIN SODIUM 2.5 MG/1
TABLET ORAL
Qty: 90 TABLET | Refills: 0 | Status: SHIPPED | OUTPATIENT
Start: 2020-09-16 | End: 2020-12-15

## 2020-09-16 NOTE — TELEPHONE ENCOUNTER
"Requested Prescriptions   Pending Prescriptions Disp Refills     warfarin ANTICOAGULANT (COUMADIN) 2.5 MG tablet [Pharmacy Med Name: WARFARIN SODIUM 2.5 MG TABLET] 90 tablet 0     Sig: TAKE 1/2 TAB EVERY FRI / TAKE 1 TABLET ALL OTHER DAYS OR AS DIRECTED BY THE INR CLINIC       Vitamin K Antagonists Failed - 9/16/2020  1:33 AM        Failed - INR is within goal in the past 6 weeks     Confirm INR is within goal in the past 6 weeks.     Recent Labs   Lab Test 08/04/20  1455   INR 2.50*                       Passed - Recent (12 mo) or future (30 days) visit within the authorizing provider's specialty     Patient has had an office visit with the authorizing provider or a provider within the authorizing providers department within the previous 12 mos or has a future within next 30 days. See \"Patient Info\" tab in inbasket, or \"Choose Columns\" in Meds & Orders section of the refill encounter.              Passed - Medication is active on med list        Passed - Patient is 18 years of age or older        Passed - Patient is not pregnant        Passed - No positive pregnancy on file in past 12 months           Last office visit 11/12/19.  Warfarin dosing is managed by INR Clinic.  Prescription approved per [facility] Refill Protocol.  Jennifer Matias RN    "

## 2020-09-16 NOTE — TELEPHONE ENCOUNTER
Pending Prescriptions:                       Disp   Refills    warfarin ANTICOAGULANT (COUMADIN) 2.5 MG t*90 tab*0        Sig: TAKE 1/2 TAB EVERY FRI / TAKE 1 TABLET ALL OTHER DAYS           OR AS DIRECTED BY THE INR CLINIC

## 2020-10-07 ENCOUNTER — ANTICOAGULATION THERAPY VISIT (OUTPATIENT)
Dept: ANTICOAGULATION | Facility: CLINIC | Age: 50
End: 2020-10-07

## 2020-10-07 DIAGNOSIS — D68.9 COAGULOPATHY (H): ICD-10-CM

## 2020-10-07 DIAGNOSIS — Z79.01 LONG TERM CURRENT USE OF ANTICOAGULANTS WITH INR GOAL OF 2.0-3.0: ICD-10-CM

## 2020-10-07 LAB
CAPILLARY BLOOD COLLECTION: NORMAL
INR PPP: 2.8 (ref 0.86–1.14)

## 2020-10-07 PROCEDURE — 99207 PR NO CHARGE NURSE ONLY: CPT | Performed by: INTERNAL MEDICINE

## 2020-10-07 PROCEDURE — 85610 PROTHROMBIN TIME: CPT | Performed by: INTERNAL MEDICINE

## 2020-10-07 PROCEDURE — 36416 COLLJ CAPILLARY BLOOD SPEC: CPT | Performed by: INTERNAL MEDICINE

## 2020-10-07 NOTE — PROGRESS NOTES
Attempted to call patient, unable to leave a message, voicemail is full  Elaine Lu RN  ANTICOAGULATION FOLLOW-UP CLINIC VISIT    Patient Name:  Renetta Reid  Date:  10/7/2020  Contact Type:  Telephone/ Called patient, she denies any changes. Orders discussed with patient, she agrees with the plan, patient requesting INR check in 4 weeks. Lab INR appointment scheduled on 20.    SUBJECTIVE:  Patient Findings     Comments:  The patient was assessed for diet, medication, and activity level changes, missed or extra doses, bruising or bleeding, with no problem findings. Maintenance warfarin dosing was reviewed with patient and will remain on the same dose until next INR check. Patient did not have any questions or concerns.           Clinical Outcomes     Negatives:  Major bleeding event, Thromboembolic event, Anticoagulation-related hospital admission, Anticoagulation-related ED visit, Anticoagulation-related fatality    Comments:  The patient was assessed for diet, medication, and activity level changes, missed or extra doses, bruising or bleeding, with no problem findings. Maintenance warfarin dosing was reviewed with patient and will remain on the same dose until next INR check. Patient did not have any questions or concerns.              OBJECTIVE    Recent labs: (last 7 days)     10/07/20  0910   INR 2.80*       ASSESSMENT / PLAN  INR assessment THER    Recheck INR In: 4 WEEKS    INR Location Outside lab      Anticoagulation Summary  As of 10/7/2020    INR goal:  2.0-3.0   TTR:  66.2 % (1 y)   INR used for dosin.80 (10/7/2020)   Warfarin maintenance plan:  1.25 mg (2.5 mg x 0.5) every Fri; 2.5 mg (2.5 mg x 1) all other days   Full warfarin instructions:  1.25 mg every Fri; 2.5 mg all other days   Weekly warfarin total:  16.25 mg   No change documented:  Elaine Lu RN   Plan last modified:  Elaine Lu RN (2020)   Next INR check:  2020   Priority:  Maintenance   Target end date:   Indefinite    Indications    Long term current use of anticoagulants with INR goal of 2.0-3.0 [Z79.01]  Coagulopathy (H) [D68.9]             Anticoagulation Episode Summary     INR check location:      Preferred lab:      Send INR reminders to:  [facility]    Comments:        Anticoagulation Care Providers     Provider Role Specialty Phone number    Hortencia Marcus MD [facility] Internal Medicine [phone]            See the Encounter Report to view Anticoagulation Flowsheet and Dosing Calendar (Go to Encounters tab in chart review, and find the Anticoagulation Therapy Visit)    Dosage adjustment made based on physician directed care plan.    Elaine Lu RN

## 2020-10-13 DIAGNOSIS — I10 BENIGN ESSENTIAL HYPERTENSION: ICD-10-CM

## 2020-10-13 RX ORDER — AMLODIPINE BESYLATE 5 MG/1
TABLET ORAL
Qty: 90 TABLET | Refills: 0 | Status: SHIPPED | OUTPATIENT
Start: 2020-10-13 | End: 2020-10-29

## 2020-10-13 NOTE — LETTER
[facility]  [address], [address]  [place], [place]  [address]                                            TEL:[phone]  FAX:[phone]      Renetta Reid  [address] [address] [address]      October 20, 2020    Dear Renetta       We have received a refill request from your pharmacy for your medication - amlodpine. Many medications require routine follow-up with your provider and a review of your chart indicates that you are due for an appointment. We have sent a one time, 90 day refill to your pharmacy to allow you time to schedule an appointment.     Please call [phone] to schedule an appointment.  We look forward to seeing you in the near future.      Thank you,     [facility]

## 2020-10-13 NOTE — TELEPHONE ENCOUNTER
Medication is being filled for 1 time refill only due to:  Patient needs to be seen because it has been more than one year since last visit.    Please call patient and assist with scheduling prior to additional refills.    Gypsy LIN - Registered Nurse  [facility]  Acute and Diagnostic Services

## 2020-11-16 ENCOUNTER — DOCUMENTATION ONLY (OUTPATIENT)
Dept: INTERNAL MEDICINE | Facility: CLINIC | Age: 50
End: 2020-11-16

## 2020-11-16 ENCOUNTER — ANTICOAGULATION THERAPY VISIT (OUTPATIENT)
Dept: INTERNAL MEDICINE | Facility: CLINIC | Age: 50
End: 2020-11-16

## 2020-11-16 ENCOUNTER — OFFICE VISIT (OUTPATIENT)
Dept: INTERNAL MEDICINE | Facility: CLINIC | Age: 50
End: 2020-11-16
Payer: COMMERCIAL

## 2020-11-16 VITALS
WEIGHT: 263.3 LBS | BODY MASS INDEX: 48.45 KG/M2 | RESPIRATION RATE: 18 BRPM | OXYGEN SATURATION: 100 % | DIASTOLIC BLOOD PRESSURE: 75 MMHG | HEIGHT: 62 IN | SYSTOLIC BLOOD PRESSURE: 130 MMHG | TEMPERATURE: 97.8 F | HEART RATE: 124 BPM

## 2020-11-16 DIAGNOSIS — L50.9 URTICARIA: ICD-10-CM

## 2020-11-16 DIAGNOSIS — I10 BENIGN ESSENTIAL HYPERTENSION: Primary | ICD-10-CM

## 2020-11-16 DIAGNOSIS — Z79.01 LONG TERM CURRENT USE OF ANTICOAGULANTS WITH INR GOAL OF 2.0-3.0: ICD-10-CM

## 2020-11-16 DIAGNOSIS — L65.9 HAIR LOSS: ICD-10-CM

## 2020-11-16 DIAGNOSIS — D68.9 COAGULOPATHY (H): ICD-10-CM

## 2020-11-16 DIAGNOSIS — D68.61 ANTI-CARDIOLIPIN ANTIBODY SYNDROME (H): ICD-10-CM

## 2020-11-16 DIAGNOSIS — E66.01 MORBID OBESITY (H): ICD-10-CM

## 2020-11-16 DIAGNOSIS — F33.0 MILD EPISODE OF RECURRENT MAJOR DEPRESSIVE DISORDER (H): ICD-10-CM

## 2020-11-16 DIAGNOSIS — D68.9 COAGULOPATHY (H): Primary | ICD-10-CM

## 2020-11-16 DIAGNOSIS — F41.9 ANXIETY: ICD-10-CM

## 2020-11-16 DIAGNOSIS — K21.9 GASTROESOPHAGEAL REFLUX DISEASE WITHOUT ESOPHAGITIS: ICD-10-CM

## 2020-11-16 DIAGNOSIS — Z13.6 CARDIOVASCULAR SCREENING; LDL GOAL LESS THAN 130: ICD-10-CM

## 2020-11-16 LAB
CAPILLARY BLOOD COLLECTION: NORMAL
INR PPP: 2.2 (ref 0.86–1.14)

## 2020-11-16 PROCEDURE — 99207 PR NO CHARGE NURSE ONLY: CPT | Performed by: INTERNAL MEDICINE

## 2020-11-16 PROCEDURE — 85610 PROTHROMBIN TIME: CPT | Performed by: INTERNAL MEDICINE

## 2020-11-16 PROCEDURE — 99214 OFFICE O/P EST MOD 30 MIN: CPT | Mod: 25 | Performed by: INTERNAL MEDICINE

## 2020-11-16 PROCEDURE — 36416 COLLJ CAPILLARY BLOOD SPEC: CPT | Performed by: INTERNAL MEDICINE

## 2020-11-16 PROCEDURE — 90472 IMMUNIZATION ADMIN EACH ADD: CPT | Performed by: INTERNAL MEDICINE

## 2020-11-16 PROCEDURE — 96127 BRIEF EMOTIONAL/BEHAV ASSMT: CPT | Performed by: INTERNAL MEDICINE

## 2020-11-16 PROCEDURE — 90471 IMMUNIZATION ADMIN: CPT | Performed by: INTERNAL MEDICINE

## 2020-11-16 PROCEDURE — 90714 TD VACC NO PRESV 7 YRS+ IM: CPT | Performed by: INTERNAL MEDICINE

## 2020-11-16 PROCEDURE — 90682 RIV4 VACC RECOMBINANT DNA IM: CPT | Performed by: INTERNAL MEDICINE

## 2020-11-16 ASSESSMENT — MIFFLIN-ST. JEOR: SCORE: 1759.63

## 2020-11-16 NOTE — NURSING NOTE
"/75 (BP Location: Right arm, Patient Position: Sitting, Cuff Size: Adult Large)   Pulse 124   Temp 97.8  F (36.6  C) (Oral)   Resp 18   Ht 1.562 m (5' 1.5\")   Wt 119.4 kg (263 lb 4.8 oz)   LMP 11/16/2020   SpO2 100%   BMI 48.94 kg/m      "

## 2020-11-16 NOTE — PROGRESS NOTES
Subjective     Renetta Reid is a 50 year old female who presents to clinic today for the following health issues:    HPI          Hypertension Follow-up      Do you check your blood pressure regularly outside of the clinic? No     Are you following a low salt diet? No    Are your blood pressures ever more than 140 on the top number (systolic) OR more   than 90 on the bottom number (diastolic), for example 140/90? No    Depression and Anxiety Follow-Up    How are you doing with your depression since your last visit? Improved     How are you doing with your anxiety since your last visit?  Worsened, has had some stresses, the pandemic has affected her.     Are you having other symptoms that might be associated with depression or anxiety? Yes:  sleep issue, panic attack    Have you had a significant life event? Job Concerns     Do you have any concerns with your use of alcohol or other drugs? No    Social History     Tobacco Use     Smoking status: Never Smoker     Smokeless tobacco: Never Used   Substance Use Topics     Alcohol use: Yes     Comment: socially     Drug use: No     PHQ 6/4/2019 11/16/2019   PHQ-9 Total Score 9 10   Q9: Thoughts of better off dead/self-harm past 2 weeks Not at all Not at all     AV-7 SCORE 6/4/2019 11/16/2019   Total Score 7 10       Suicide Assessment Five-step Evaluation and Treatment (SAFE-T)      How many servings of fruits and vegetables do you eat daily?  0-1    On average, how many sweetened beverages do you drink each day (Examples: soda, juice, sweet tea, etc.  Do NOT count diet or artificially sweetened beverages)?   0    How many days per week do you exercise enough to make your heart beat faster? 3 or less    How many minutes a day do you exercise enough to make your heart beat faster? 9 or less    How many days per week do you miss taking your medication? 0    Other problems:   1. Coagulopathy: she has some questions about this, cause, need for long term anticoagulation  2.  "Morbid obesity: after gaining a lot has lost a little.   3. GERD: stable  4.  Urticaria: Infrequent, stable    Current Concerns:   She has had hair loss, this is not new but seems a little more diffuse in the back, for 7 months.   She is concerned about heart attack risk, positive family history       Patient Active Problem List   Diagnosis     Calculus of kidney     CARDIOVASCULAR SCREENING; LDL GOAL LESS THAN 160     Coagulopathy (H)     HSP (Henoch Schonlein purpura) (H)     Gastroesophageal reflux disease without esophagitis     Long term current use of anticoagulants with INR goal of 2.0-3.0     Morbid obesity (H)     Anxiety     Mild episode of recurrent major depressive disorder (H)     Benign essential hypertension       Current Outpatient Medications   Medication Sig Dispense Refill     amLODIPine (NORVASC) 5 MG tablet TAKE 1 TABLET BY MOUTH EVERY DAY 90 tablet 0     hydrOXYzine (ATARAX) 10 MG tablet TAKE 1 TABLET BY MOUTH EVERYDAY AT BEDTIME 90 tablet 1     omeprazole (PRILOSEC) 20 MG DR capsule TAKE 1 CAPSULE BY MOUTH EVERY DAY 90 capsule 1     warfarin ANTICOAGULANT (COUMADIN) 2.5 MG tablet TAKE 1/2 TAB EVERY FRI / TAKE 1 TABLET ALL OTHER DAYS OR AS DIRECTED BY THE INR CLINIC 90 tablet 0         Social History     Tobacco Use     Smoking status: Never Smoker     Smokeless tobacco: Never Used   Substance Use Topics     Alcohol use: Yes     Comment: socially        Review of Systems   No fever, chills, no dyspnea on exertion, no chest pain, palpitations, PND, orthopnea, edema, syncope, headache, abdominal pain       Objective    /75 (BP Location: Right arm, Patient Position: Sitting, Cuff Size: Adult Large)   Pulse 124   Temp 97.8  F (36.6  C) (Oral)   Resp 18   Ht 1.562 m (5' 1.5\")   Wt 119.4 kg (263 lb 4.8 oz)   LMP 11/16/2020   SpO2 100%   BMI 48.94 kg/m    Body mass index is 48.94 kg/m .  Physical Exam     Scalp with mildly thinned hair, no scaling, no alopecia patches  Lungs: clear  CV: " normal S1, S2 without murmur, S3 or S4.   Abdomen: Bowel sounds normal, soft, nontender. No hepatosplenomegaly. No masses.   No edema  Pulses full, no carotid bruits      PHQ 6/4/2019 11/16/2019 11/22/2020   PHQ-9 Total Score 9 10 4   Q9: Thoughts of better off dead/self-harm past 2 weeks Not at all Not at all Not at all     AV-7 SCORE 6/4/2019 11/16/2019 11/22/2020   Total Score 7 10 11               Assessment & Plan     Benign essential hypertension  Well controlled, continue meds, advised about symptoms that might suggest cardiovascular disease but would not do testing without symptoms.  - amLODIPine (NORVASC) 5 MG tablet; Take 1 tablet (5 mg) by mouth daily  - Basic metabolic panel  (Ca, Cl, CO2, Creat, Gluc, K, Na, BUN); Future  - Albumin Random Urine Quantitative with Creat Ratio; Future    Mild episode of recurrent major depressive disorder (H)  Stable symptoms, no need for medication    Anti-cardiolipin antibody syndrome (H)  Discussed this condition, that it is acquired, with previous clots it would be important to continue long-term anticoagulation.    Morbid obesity (H)  Encouraged her to work on weight loss    Anxiety  Some increased symptoms, discussed options, she does not wish to consider medication at this time, does not want counseling.  Discussed some online apps, relaxation techniques.    Urticaria  Stable, refill medication  - hydrOXYzine (ATARAX) 10 MG tablet; Take 1 tablet (10 mg) by mouth At Bedtime    Gastroesophageal reflux disease without esophagitis  Stable, refill medication  - omeprazole (PRILOSEC) 20 MG DR capsule; Take 1 capsule (20 mg) by mouth daily    Hair loss  Advised this might be stress related, postmenopausal, does not appear to have any skilled condition or changes it is just elevation in her body but could do some labs in future.  - TSH with free T4 reflex; Future  - Anti Nuclear Gisell IgG by IFA with Reflex; Future    CARDIOVASCULAR SCREENING; LDL GOAL LESS THAN 130    -  "Lipid panel reflex to direct LDL Fasting; Future     BMI:   Estimated body mass index is 48.94 kg/m  as calculated from the following:    Height as of this encounter: 1.562 m (5' 1.5\").    Weight as of this encounter: 119.4 kg (263 lb 4.8 oz).   Weight management plan: Discussed healthy diet and exercise guidelines             Return in about 6 months (around 5/16/2021) for Physical Exam.    Hortencia Marcus MD  [facility]      "

## 2020-11-16 NOTE — PROGRESS NOTES
ANTICOAGULATION FOLLOW-UP     Patient Name:  Renetta Reid  Date:  2020  Contact Type:  Telephone    SUBJECTIVE:  Patient Findings     Comments:  The patient was assessed for   diet, medication,   missed or extra doses,   bruising or bleeding,   with no problem findings.  No questions or concerns.  Reviewed previous warfarin dosing with patient.  She took warfarin as instructed.    INR is therapeutic today.   Patient will continue same maintenance dose.   Follow up in 6 weeks.              Clinical Outcomes     Comments:  The patient was assessed for   diet, medication,   missed or extra doses,   bruising or bleeding,   with no problem findings.  No questions or concerns.  Reviewed previous warfarin dosing with patient.  She took warfarin as instructed.    INR is therapeutic today.   Patient will continue same maintenance dose.   Follow up in 6 weeks.                 OBJECTIVE    Recent labs: (last 7 days)     20  1451   INR 2.20*       ASSESSMENT / PLAN  INR assessment THER    Recheck INR In: 6 WEEKS    INR Location Clinic lab     Anticoagulation Summary  As of 2020    INR goal:  2.0-3.0   TTR:  66.2 % (1 y)   INR used for dosin.20 (2020)   Warfarin maintenance plan:  1.25 mg (2.5 mg x 0.5) every Fri; 2.5 mg (2.5 mg x 1) all other days   Full warfarin instructions:  1.25 mg every Fri; 2.5 mg all other days   Weekly warfarin total:  16.25 mg   Plan last modified:  Elaine Lu RN (2020)   Next INR check:  2020   Priority:  Maintenance   Target end date:  Indefinite    Indications    Long term current use of anticoagulants with INR goal of 2.0-3.0 [Z79.01]  Coagulopathy (H) [D68.9]             Anticoagulation Episode Summary     INR check location:      Preferred lab:      Send INR reminders to:  [facility]    Comments:        Anticoagulation Care Providers     Provider Role Specialty Phone number    Hortencia Marcus MD [facility] Internal Medicine [phone]             See the Encounter Report to view Anticoagulation Flowsheet and Dosing Calendar (Go to Encounters tab in chart review, and find the Anticoagulation Therapy Visit)        Willow Piedra RN

## 2020-11-16 NOTE — PROGRESS NOTES
ANTICOAGULATION MANAGEMENT      Renetta Reid due for annual renewal of referral to anticoagulation monitoring. Order pended for your review and signature.      ANTICOAGULATION SUMMARY      Warfarin indication(s)     Coagulopathy    Heart valve present?  NO       Current goal range   INR: 2.0-3.0     Goal appropriate for indication? Yes, INR 2-3 appropriate for hx of DVT, PE, hypercoagulable state, Afib, LVAD, or bileaflet AVR without risk factors     Current duration of therapy Indefinite/long term therapy   Time in Therapeutic Range (TTR)  (Goal > 60%) 66.2 %       Office visit with referring provider's group within last year yes on 11/16/2020       Willow Piedra RN

## 2020-11-17 RX ORDER — HYDROXYZINE HYDROCHLORIDE 10 MG/1
10 TABLET, FILM COATED ORAL AT BEDTIME
Qty: 90 TABLET | Refills: 3 | Status: SHIPPED | OUTPATIENT
Start: 2020-11-17 | End: 2021-12-02

## 2020-11-17 RX ORDER — AMLODIPINE BESYLATE 5 MG/1
5 TABLET ORAL DAILY
Qty: 90 TABLET | Refills: 3 | Status: SHIPPED | OUTPATIENT
Start: 2020-11-17 | End: 2022-01-21

## 2020-11-22 PROBLEM — L50.9 URTICARIA: Status: ACTIVE | Noted: 2020-11-22

## 2020-11-22 ASSESSMENT — PATIENT HEALTH QUESTIONNAIRE - PHQ9
SUM OF ALL RESPONSES TO PHQ QUESTIONS 1-9: 4
5. POOR APPETITE OR OVEREATING: SEVERAL DAYS

## 2020-11-22 ASSESSMENT — ANXIETY QUESTIONNAIRES
3. WORRYING TOO MUCH ABOUT DIFFERENT THINGS: SEVERAL DAYS
7. FEELING AFRAID AS IF SOMETHING AWFUL MIGHT HAPPEN: MORE THAN HALF THE DAYS
2. NOT BEING ABLE TO STOP OR CONTROL WORRYING: MORE THAN HALF THE DAYS
1. FEELING NERVOUS, ANXIOUS, OR ON EDGE: NEARLY EVERY DAY
GAD7 TOTAL SCORE: 11
6. BECOMING EASILY ANNOYED OR IRRITABLE: SEVERAL DAYS
5. BEING SO RESTLESS THAT IT IS HARD TO SIT STILL: SEVERAL DAYS

## 2020-11-23 ASSESSMENT — ANXIETY QUESTIONNAIRES: GAD7 TOTAL SCORE: 11

## 2020-12-13 DIAGNOSIS — D68.9 COAGULOPATHY (H): ICD-10-CM

## 2020-12-13 DIAGNOSIS — Z79.01 LONG TERM CURRENT USE OF ANTICOAGULANTS WITH INR GOAL OF 2.0-3.0: ICD-10-CM

## 2020-12-15 RX ORDER — WARFARIN SODIUM 2.5 MG/1
TABLET ORAL
Qty: 90 TABLET | Refills: 1 | Status: SHIPPED | OUTPATIENT
Start: 2020-12-15 | End: 2021-06-09

## 2020-12-15 NOTE — TELEPHONE ENCOUNTER
Last INR: 11/16/20=2.2  Next INR: 12/29/20    Prescription approved per [facility] Refill Protocol.    Maryann Gutierrez RN

## 2021-01-05 ENCOUNTER — TELEPHONE (OUTPATIENT)
Dept: ANTICOAGULATION | Facility: CLINIC | Age: 51
End: 2021-01-05

## 2021-01-05 NOTE — TELEPHONE ENCOUNTER
Due for INR 12/29/21.  Called patient, left message to hernandez and schedule INR appointment.   Elaine Lu RN BSN

## 2021-01-14 ENCOUNTER — HEALTH MAINTENANCE LETTER (OUTPATIENT)
Age: 51
End: 2021-01-14

## 2021-02-05 NOTE — TELEPHONE ENCOUNTER
Patient did not show for 12/29/20 lab INR appointment.    Patient canceled lab INR appointments on 1/21/21 and 2/2/21.    Patient is currently scheduled on 2/10/21 for lab INR appointment.  Elaine Lu RN, BSN

## 2021-02-10 ENCOUNTER — ANTICOAGULATION THERAPY VISIT (OUTPATIENT)
Dept: ANTICOAGULATION | Facility: CLINIC | Age: 51
End: 2021-02-10

## 2021-02-10 DIAGNOSIS — D68.61 ANTI-CARDIOLIPIN ANTIBODY SYNDROME (H): ICD-10-CM

## 2021-02-10 DIAGNOSIS — Z79.01 LONG TERM CURRENT USE OF ANTICOAGULANTS WITH INR GOAL OF 2.0-3.0: ICD-10-CM

## 2021-02-10 DIAGNOSIS — D68.9 COAGULOPATHY (H): ICD-10-CM

## 2021-02-10 LAB
CAPILLARY BLOOD COLLECTION: NORMAL
INR PPP: 1.5 (ref 0.86–1.14)

## 2021-02-10 PROCEDURE — 85610 PROTHROMBIN TIME: CPT | Performed by: INTERNAL MEDICINE

## 2021-02-10 PROCEDURE — 99207 PR NO CHARGE NURSE ONLY: CPT

## 2021-02-10 PROCEDURE — 36416 COLLJ CAPILLARY BLOOD SPEC: CPT | Performed by: INTERNAL MEDICINE

## 2021-02-10 NOTE — PROGRESS NOTES
ANTICOAGULATION FOLLOW-UP CLINIC VISIT    Patient Name:  Renetta Reid  Date:  2/10/2021  Contact Type:  Telephone/ discussed with patient    SUBJECTIVE:  Patient Findings     Positives:  Change in diet/appetite (Has started eating a low carb diet about 6 weeks ago.  She does not think she had an increase in her green veggies this week, but had been eating more green veggies prior to that.  She plans to continue this diet.)    Comments:  Patient has lost about 7 lbs since starting her new diet.  She also plans to add in more exercise once the weather starts warming up.  The patient was assessed for medication, and activity level changes, missed or extra doses, bruising or bleeding, with no problem findings.  Maintenance dose increased by 8% today since patient plans to continue on this diet plan.            Clinical Outcomes     Negatives:  Major bleeding event, Thromboembolic event, Anticoagulation-related hospital admission, Anticoagulation-related ED visit, Anticoagulation-related fatality    Comments:  Patient has lost about 7 lbs since starting her new diet.  She also plans to add in more exercise once the weather starts warming up.  The patient was assessed for medication, and activity level changes, missed or extra doses, bruising or bleeding, with no problem findings.  Maintenance dose increased by 8% today since patient plans to continue on this diet plan.               OBJECTIVE    Recent labs: (last 7 days)     02/10/21  1450   INR 1.50*       ASSESSMENT / PLAN  INR assessment SUB    Recheck INR In: 2 WEEKS    INR Location Clinic      Anticoagulation Summary  As of 2/10/2021    INR goal:  2.0-3.0   TTR:  62.1 % (1 y)   INR used for dosin.50 (2/10/2021)   Warfarin maintenance plan:  2.5 mg (2.5 mg x 1) every day   Full warfarin instructions:  2/10: 5 mg; Otherwise 2.5 mg every day   Weekly warfarin total:  17.5 mg   Plan last modified:  Jennifer Matias RN (2/10/2021)   Next INR check:  2021    Priority:  Maintenance   Target end date:  Indefinite    Indications    Long term current use of anticoagulants with INR goal of 2.0-3.0 [Z79.01]  Anti-cardiolipin antibody syndrome (H) [D68.61]             Anticoagulation Episode Summary     INR check location:      Preferred lab:      Send INR reminders to:  [facility]    Comments:        Anticoagulation Care Providers     Provider Role Specialty Phone number    Hortencia Marcus MD Referring Internal Medicine [phone]            See the Encounter Report to view Anticoagulation Flowsheet and Dosing Calendar (Go to Encounters tab in chart review, and find the Anticoagulation Therapy Visit)    Dosage adjustment made based on physician directed care plan.    Jennifer Matias RN

## 2021-02-24 ENCOUNTER — ANTICOAGULATION THERAPY VISIT (OUTPATIENT)
Dept: ANTICOAGULATION | Facility: CLINIC | Age: 51
End: 2021-02-24

## 2021-02-24 DIAGNOSIS — Z79.01 LONG TERM CURRENT USE OF ANTICOAGULANTS WITH INR GOAL OF 2.0-3.0: ICD-10-CM

## 2021-02-24 DIAGNOSIS — D68.9 COAGULOPATHY (H): ICD-10-CM

## 2021-02-24 DIAGNOSIS — D68.61 ANTI-CARDIOLIPIN ANTIBODY SYNDROME (H): ICD-10-CM

## 2021-02-24 LAB
CAPILLARY BLOOD COLLECTION: NORMAL
INR PPP: 2 (ref 0.86–1.14)

## 2021-02-24 PROCEDURE — 36416 COLLJ CAPILLARY BLOOD SPEC: CPT | Performed by: INTERNAL MEDICINE

## 2021-02-24 PROCEDURE — 85610 PROTHROMBIN TIME: CPT | Performed by: INTERNAL MEDICINE

## 2021-02-24 PROCEDURE — 99207 PR NO CHARGE NURSE ONLY: CPT

## 2021-02-24 NOTE — PROGRESS NOTES
ANTICOAGULATION FOLLOW-UP CLINIC VISIT    Patient Name:  Renetta Reid  Date:  2021  Contact Type:  Telephone/ Called patient, she denies any changes. Orders discussed with the patient, she agrees with the plan. Lab INR appointment scheduled on 3/10/21.    SUBJECTIVE:  Patient Findings     Positives:  Missed doses (Patient reports missing warfarin dose 21.)    Comments:  The patient was assessed for diet, medication, and activity level changes, missed or extra doses, bruising or bleeding, with no problem findings. Maintenance warfarin dosing was reviewed with patient and will remain on the same dose until next INR check. Patient did not have any questions or concerns.           Clinical Outcomes     Comments:  The patient was assessed for diet, medication, and activity level changes, missed or extra doses, bruising or bleeding, with no problem findings. Maintenance warfarin dosing was reviewed with patient and will remain on the same dose until next INR check. Patient did not have any questions or concerns.              OBJECTIVE    Recent labs: (last 7 days)     21  1523   INR 2.00*       ASSESSMENT / PLAN  INR assessment THER    Recheck INR In: 2 WEEKS    INR Location Outside lab      Anticoagulation Summary  As of 2021    INR goal:  2.0-3.0   TTR:  62.1 % (1 y)   INR used for dosin.00 (2021)   Warfarin maintenance plan:  2.5 mg (2.5 mg x 1) every day   Full warfarin instructions:  2.5 mg every day   Weekly warfarin total:  17.5 mg   No change documented:  Elaine Lu RN   Plan last modified:  Jennifer Matias RN (2/10/2021)   Next INR check:  3/10/2021   Priority:  Maintenance   Target end date:  Indefinite    Indications    Long term current use of anticoagulants with INR goal of 2.0-3.0 [Z79.01]  Anti-cardiolipin antibody syndrome (H) [D68.61]             Anticoagulation Episode Summary     INR check location:      Preferred lab:      Send INR reminders to:  [facility]  [facility]    Comments:        Anticoagulation Care Providers     Provider Role Specialty Phone number    Hortencia Marcus MD Referring Internal Medicine [phone]            See the Encounter Report to view Anticoagulation Flowsheet and Dosing Calendar (Go to Encounters tab in chart review, and find the Anticoagulation Therapy Visit)    Dosage adjustment made based on physician directed care plan.    Elaine Lu RN

## 2021-04-06 ENCOUNTER — ANTICOAGULATION THERAPY VISIT (OUTPATIENT)
Dept: ANTICOAGULATION | Facility: CLINIC | Age: 51
End: 2021-04-06
Payer: COMMERCIAL

## 2021-04-06 DIAGNOSIS — Z79.01 LONG TERM CURRENT USE OF ANTICOAGULANTS WITH INR GOAL OF 2.0-3.0: Primary | ICD-10-CM

## 2021-04-06 DIAGNOSIS — Z79.01 LONG TERM CURRENT USE OF ANTICOAGULANTS WITH INR GOAL OF 2.0-3.0: ICD-10-CM

## 2021-04-06 DIAGNOSIS — D68.61 ANTI-CARDIOLIPIN ANTIBODY SYNDROME (H): ICD-10-CM

## 2021-04-06 DIAGNOSIS — D68.9 COAGULOPATHY (H): ICD-10-CM

## 2021-04-06 LAB
CAPILLARY BLOOD COLLECTION: NORMAL
INR PPP: 2.6 (ref 0.86–1.14)

## 2021-04-06 PROCEDURE — 36416 COLLJ CAPILLARY BLOOD SPEC: CPT | Performed by: INTERNAL MEDICINE

## 2021-04-06 PROCEDURE — 99207 PR NO CHARGE NURSE ONLY: CPT

## 2021-04-06 PROCEDURE — 85610 PROTHROMBIN TIME: CPT | Performed by: INTERNAL MEDICINE

## 2021-04-06 NOTE — PROGRESS NOTES
ANTICOAGULATION FOLLOW-UP CLINIC VISIT    Patient Name:  Renetta Reid  Date:  2021  Contact Type:  Telephone/ Called patient, she denies any changes. Orders discussed with the patient, she agrees with the plan. Lab INR appoinment scheduled on 21.    SUBJECTIVE:  Patient Findings     Comments:  The patient was assessed for diet, medication, and activity level changes, missed or extra doses, bruising or bleeding, with no problem findings. Maintenance warfarin dosing was reviewed with patient and will remain on the same dose until next INR check. Patient did not have any questions or concerns.           Clinical Outcomes     Comments:  The patient was assessed for diet, medication, and activity level changes, missed or extra doses, bruising or bleeding, with no problem findings. Maintenance warfarin dosing was reviewed with patient and will remain on the same dose until next INR check. Patient did not have any questions or concerns.              OBJECTIVE    Recent labs: (last 7 days)     21  1329   INR 2.60*       ASSESSMENT / PLAN  INR assessment THER    Recheck INR In: 3 WEEKS    INR Location Outside lab      Anticoagulation Summary  As of 2021    INR goal:  2.0-3.0   TTR:  71.6 % (1 y)   INR used for dosin.60 (2021)   Warfarin maintenance plan:  2.5 mg (2.5 mg x 1) every day   Full warfarin instructions:  2.5 mg every day   Weekly warfarin total:  17.5 mg   No change documented:  Elaine Lu RN   Plan last modified:  Jennifer Matias RN (2/10/2021)   Next INR check:  2021   Priority:  Maintenance   Target end date:  Indefinite    Indications    Long term current use of anticoagulants with INR goal of 2.0-3.0 [Z79.01]  Anti-cardiolipin antibody syndrome (H) [D68.61]             Anticoagulation Episode Summary     INR check location:      Preferred lab:      Send INR reminders to:  [facility]    Comments:        Anticoagulation Care Providers     Provider Role  Specialty Phone number    Hortencia Marcus MD Referring Internal Medicine [phone]            See the Encounter Report to view Anticoagulation Flowsheet and Dosing Calendar (Go to Encounters tab in chart review, and find the Anticoagulation Therapy Visit)    Dosage adjustment made based on physician directed care plan.    Elaine Lu RN

## 2021-04-27 ENCOUNTER — ANTICOAGULATION THERAPY VISIT (OUTPATIENT)
Dept: ANTICOAGULATION | Facility: CLINIC | Age: 51
End: 2021-04-27

## 2021-04-27 DIAGNOSIS — Z79.01 LONG TERM CURRENT USE OF ANTICOAGULANTS WITH INR GOAL OF 2.0-3.0: ICD-10-CM

## 2021-04-27 DIAGNOSIS — D68.61 ANTI-CARDIOLIPIN ANTIBODY SYNDROME (H): ICD-10-CM

## 2021-04-27 LAB
CAPILLARY BLOOD COLLECTION: NORMAL
INR PPP: 2.4 (ref 0.86–1.14)

## 2021-04-27 PROCEDURE — 85610 PROTHROMBIN TIME: CPT | Performed by: INTERNAL MEDICINE

## 2021-04-27 PROCEDURE — 36416 COLLJ CAPILLARY BLOOD SPEC: CPT | Performed by: INTERNAL MEDICINE

## 2021-04-27 PROCEDURE — 99207 PR NO CHARGE NURSE ONLY: CPT

## 2021-04-27 NOTE — PROGRESS NOTES
Left message to call [phone]. Please transfer call to Elaine at [phone].  Elaine Lu RN, BSN  Anticoagulation Clinic

## 2021-04-27 NOTE — PROGRESS NOTES
ANTICOAGULATION FOLLOW-UP CLINIC VISIT    Patient Name:  Renetta Reid  Date:  2021  Contact Type:  Telephone/ Called patient, she denies any changes. Orders discussed with the patient, she agrees with the plan. Lab INR appointment scheduled om 21    SUBJECTIVE:  Patient Findings     Comments:  The patient was assessed for diet, medication, and activity level changes, missed or extra doses, bruising or bleeding, with no problem findings. Maintenance warfarin dosing was reviewed with patient and will remain on the same dose until next INR check. Patient did not have any questions or concerns.           Clinical Outcomes     Negatives:  Major bleeding event, Thromboembolic event, Anticoagulation-related hospital admission, Anticoagulation-related ED visit, Anticoagulation-related fatality    Comments:  The patient was assessed for diet, medication, and activity level changes, missed or extra doses, bruising or bleeding, with no problem findings. Maintenance warfarin dosing was reviewed with patient and will remain on the same dose until next INR check. Patient did not have any questions or concerns.              OBJECTIVE    Recent labs: (last 7 days)     21  1045   INR 2.40*       ASSESSMENT / PLAN  INR assessment THER    Recheck INR In: 4 WEEKS    INR Location Outside lab      Anticoagulation Summary  As of 2021    INR goal:  2.0-3.0   TTR:  71.6 % (1 y)   INR used for dosin.40 (2021)   Warfarin maintenance plan:  2.5 mg (2.5 mg x 1) every day   Full warfarin instructions:  2.5 mg every day   Weekly warfarin total:  17.5 mg   No change documented:  Elaine Lu RN   Plan last modified:  Jennifer Matias RN (2/10/2021)   Next INR check:  2021   Priority:  Maintenance   Target end date:  Indefinite    Indications    Long term current use of anticoagulants with INR goal of 2.0-3.0 [Z79.01]  Anti-cardiolipin antibody syndrome (H) [D68.61]             Anticoagulation Episode Summary      INR check location:      Preferred lab:      Send INR reminders to:  [facility]    Comments:        Anticoagulation Care Providers     Provider Role Specialty Phone number    Hortencia Marcus MD Referring Internal Medicine [phone]            See the Encounter Report to view Anticoagulation Flowsheet and Dosing Calendar (Go to Encounters tab in chart review, and find the Anticoagulation Therapy Visit)    Dosage adjustment made based on physician directed care plan.    Elaine Lu RN

## 2021-06-07 DIAGNOSIS — D68.9 COAGULOPATHY (H): ICD-10-CM

## 2021-06-07 DIAGNOSIS — Z79.01 LONG TERM CURRENT USE OF ANTICOAGULANTS WITH INR GOAL OF 2.0-3.0: ICD-10-CM

## 2021-06-08 ENCOUNTER — ANTICOAGULATION THERAPY VISIT (OUTPATIENT)
Dept: ANTICOAGULATION | Facility: CLINIC | Age: 51
End: 2021-06-08

## 2021-06-08 DIAGNOSIS — Z79.01 LONG TERM CURRENT USE OF ANTICOAGULANTS WITH INR GOAL OF 2.0-3.0: ICD-10-CM

## 2021-06-08 DIAGNOSIS — D68.61 ANTI-CARDIOLIPIN ANTIBODY SYNDROME (H): ICD-10-CM

## 2021-06-08 LAB
CAPILLARY BLOOD COLLECTION: NORMAL
INR PPP: 3.6 (ref 0.86–1.14)

## 2021-06-08 PROCEDURE — 85610 PROTHROMBIN TIME: CPT | Performed by: INTERNAL MEDICINE

## 2021-06-08 PROCEDURE — 99207 PR NO CHARGE NURSE ONLY: CPT

## 2021-06-08 PROCEDURE — 36416 COLLJ CAPILLARY BLOOD SPEC: CPT | Performed by: INTERNAL MEDICINE

## 2021-06-08 NOTE — PROGRESS NOTES
Anticoagulation Management    Unable to reach Renetta today.    Today's INR result of 3.6 is supratherapeutic (goal INR of 2.0-3.0).  Result received from: Clinic Lab    Follow up required to confirm warfarin dose taken and assess for changes    Left message to hold warfarin tonight.      Anticoagulation clinic to follow up    Jennifer Matias RN

## 2021-06-09 RX ORDER — WARFARIN SODIUM 2.5 MG/1
TABLET ORAL
Qty: 90 TABLET | Refills: 0 | Status: SHIPPED | OUTPATIENT
Start: 2021-06-09 | End: 2021-09-08

## 2021-06-09 NOTE — TELEPHONE ENCOUNTER
"Requested Prescriptions   Pending Prescriptions Disp Refills     warfarin ANTICOAGULANT (COUMADIN) 2.5 MG tablet [Pharmacy Med Name: WARFARIN SODIUM 2.5 MG TABLET] 90 tablet 1     Sig: TAKE 1/2 TAB EVERY FRI / TAKE 1 TABLET ALL OTHER DAYS OR AS DIRECTED BY THE INR CLINIC       Vitamin K Antagonists Failed - 6/9/2021  3:38 PM        Failed - INR is within goal in the past 6 weeks     Confirm INR is within goal in the past 6 weeks.     Recent Labs   Lab Test 06/08/21  1520   INR 3.60*                       Passed - Recent (12 mo) or future (30 days) visit within the authorizing provider's specialty     Patient has had an office visit with the authorizing provider or a provider within the authorizing providers department within the previous 12 mos or has a future within next 30 days. See \"Patient Info\" tab in inbasket, or \"Choose Columns\" in Meds & Orders section of the refill encounter.              Passed - Medication is active on med list        Passed - Patient is 18 years of age or older        Passed - Patient is not pregnant        Passed - No positive pregnancy on file in past 12 months           Last office visit 11/16/20.  Warfarin dosing is managed by INR Clinic.  Prescription approved per [facility] Refill Protocol.  Jennifer Matias RN    "

## 2021-06-09 NOTE — PROGRESS NOTES
Left message for patient to call office to discuss INR results.  Jennifer Matias RN  ANTICOAGULATION FOLLOW-UP CLINIC VISIT    Patient Name:  Renetta Reid  Date:  6/9/2021  Contact Type:  Telephone/ discussed with patient    SUBJECTIVE:  Patient Findings     Comments:  The patient was assessed for diet, medication, and activity level changes, missed or extra doses, bruising or bleeding, with no problem findings.  Patient denies any identifiable changes that caused the supra therapeutic INR. Will consider maintenance dose adjustment with next INR check if INR continues to be elevated.            Clinical Outcomes     Negatives:  Major bleeding event, Thromboembolic event, Anticoagulation-related hospital admission, Anticoagulation-related ED visit, Anticoagulation-related fatality    Comments:  The patient was assessed for diet, medication, and activity level changes, missed or extra doses, bruising or bleeding, with no problem findings.  Patient denies any identifiable changes that caused the supra therapeutic INR. Will consider maintenance dose adjustment with next INR check if INR continues to be elevated.               OBJECTIVE    Recent labs: (last 7 days)     06/08/21  1520   INR 3.60*       ASSESSMENT / PLAN  INR assessment SUPRA    Recheck INR In: 2 WEEKS    INR Location Clinic      Anticoagulation Summary  As of 6/8/2021    INR goal:  2.0-3.0   TTR:  73.5 % (1 y)   INR used for dosing:  3.60 (6/8/2021)   Warfarin maintenance plan:  2.5 mg (2.5 mg x 1) every day   Full warfarin instructions:  6/8: Hold; 6/9: Hold; Otherwise 2.5 mg every day   Weekly warfarin total:  17.5 mg   Plan last modified:  Jennifer Matias RN (2/10/2021)   Next INR check:  6/23/2021   Priority:  Maintenance   Target end date:  Indefinite    Indications    Long term current use of anticoagulants with INR goal of 2.0-3.0 [Z79.01]  Anti-cardiolipin antibody syndrome (H) [D68.61]             Anticoagulation Episode Summary     INR check  location:      Preferred lab:      Send INR reminders to:  [facility]    Comments:        Anticoagulation Care Providers     Provider Role Specialty Phone number    Hortencia Marcus MD Referring Internal Medicine [phone]            See the Encounter Report to view Anticoagulation Flowsheet and Dosing Calendar (Go to Encounters tab in chart review, and find the Anticoagulation Therapy Visit)    Dosage adjustment made based on physician directed care plan.    Jennifer Matias RN

## 2021-06-23 ENCOUNTER — ANTICOAGULATION THERAPY VISIT (OUTPATIENT)
Dept: ANTICOAGULATION | Facility: CLINIC | Age: 51
End: 2021-06-23

## 2021-06-23 DIAGNOSIS — D68.61 ANTI-CARDIOLIPIN ANTIBODY SYNDROME (H): ICD-10-CM

## 2021-06-23 DIAGNOSIS — Z79.01 LONG TERM CURRENT USE OF ANTICOAGULANTS WITH INR GOAL OF 2.0-3.0: ICD-10-CM

## 2021-06-23 LAB
CAPILLARY BLOOD COLLECTION: NORMAL
INR PPP: 2.6 (ref 0.86–1.14)

## 2021-06-23 PROCEDURE — 36416 COLLJ CAPILLARY BLOOD SPEC: CPT | Performed by: INTERNAL MEDICINE

## 2021-06-23 PROCEDURE — 85610 PROTHROMBIN TIME: CPT | Performed by: INTERNAL MEDICINE

## 2021-06-23 NOTE — PROGRESS NOTES
ANTICOAGULATION MANAGEMENT     Renetta Reid 50 year old female is on warfarin with therapeutic INR result. (Goal INR 2.0-3.0)    Recent labs: (last 7 days)     06/23/21  1308   INR 2.60*       ASSESSMENT     Source(s): Patient/Caregiver Call       Warfarin doses taken: Warfarin taken as instructed    Diet: Decreased greens/vitamin K in diet; plans to resume previous intake    New illness, injury, or hospitalization: No    Medication/supplement changes: None noted    Signs or symptoms of bleeding or clotting: No    Previous INR: Supratherapeutic    Additional findings: None     PLAN     Recommended plan for no diet, medication or health factor changes affecting INR     Dosing Instructions: Continue your current warfarin dose with next INR in 4 weeks       Summary  As of 6/23/2021    Full warfarin instructions:  2.5 mg every day   Next INR check:  7/21/2021             Telephone call with Renetta whom verbalizes understanding and agrees to plan    Lab visit scheduled    Education provided: Please call back if any changes to your diet, medications or how you've been taking warfarin, Importance of consistent vitamin K intake, Impact of vitamin K foods on INR, Vitamin K content of foods, Importance of notifying clinic for changes in medications; a sooner lab recheck maybe needed., Contact [phone]  with any changes, questions or concerns.  and Vitamin K foods list mailed to the patient.    Plan made per [facility] anticoagulation protocol    Elaine Lu RN  Anticoagulation Clinic  6/23/2021    _______________________________________________________________________     Anticoagulation Episode Summary     Current INR goal:  2.0-3.0   TTR:  71.1 % (1 y)   Target end date:  Indefinite   Send INR reminders to:  [facility]    Indications    Long term current use of anticoagulants with INR goal of 2.0-3.0 [Z79.01]  Anti-cardiolipin antibody syndrome (H) [D68.61]           Comments:           Anticoagulation Care  Providers     Provider Role Specialty Phone number    Hortencia Marcus MD Referring Internal Medicine [phone]

## 2021-07-16 DIAGNOSIS — Z79.01 LONG TERM CURRENT USE OF ANTICOAGULANTS WITH INR GOAL OF 2.0-3.0: Primary | ICD-10-CM

## 2021-07-16 DIAGNOSIS — D68.61 ANTI-CARDIOLIPIN ANTIBODY SYNDROME (H): ICD-10-CM

## 2021-07-16 DIAGNOSIS — D68.9 COAGULOPATHY (H): ICD-10-CM

## 2021-07-22 ENCOUNTER — LAB (OUTPATIENT)
Dept: LAB | Facility: CLINIC | Age: 51
End: 2021-07-22
Payer: COMMERCIAL

## 2021-07-22 ENCOUNTER — ANTICOAGULATION THERAPY VISIT (OUTPATIENT)
Dept: ANTICOAGULATION | Facility: CLINIC | Age: 51
End: 2021-07-22

## 2021-07-22 DIAGNOSIS — D68.61 ANTI-CARDIOLIPIN ANTIBODY SYNDROME (H): ICD-10-CM

## 2021-07-22 DIAGNOSIS — D68.9 COAGULOPATHY (H): ICD-10-CM

## 2021-07-22 DIAGNOSIS — Z79.01 LONG TERM CURRENT USE OF ANTICOAGULANTS WITH INR GOAL OF 2.0-3.0: ICD-10-CM

## 2021-07-22 DIAGNOSIS — Z79.01 LONG TERM CURRENT USE OF ANTICOAGULANTS WITH INR GOAL OF 2.0-3.0: Primary | ICD-10-CM

## 2021-07-22 LAB — INR BLD: 3 (ref 0.9–1.1)

## 2021-07-22 PROCEDURE — 85610 PROTHROMBIN TIME: CPT

## 2021-07-22 PROCEDURE — 36416 COLLJ CAPILLARY BLOOD SPEC: CPT

## 2021-07-22 NOTE — PROGRESS NOTES
Anticoagulation Management    Unable to reach Renetta by phone today.    Today's INR result of 3.0 is therapeutic (goal INR of 2.0-3.0).  Result received from: Clinic Lab    Follow up required to:   Confirm warfarin dose taken and assess for changes  Schedule next INR appointment    Left message to continue current dose of warfarin 2.5 mg tonight.      Anticoagulation clinic to follow up    [facility]tico[facility] ([facility]R) [facility]

## 2021-07-23 NOTE — PROGRESS NOTES
Anticoagulation Management    Unable to reach Renetta today.        Left message to continue 2.5 mg warfarin daily  this weekend.      Anticoagulation clinic to follow up    Elaine Lu RN

## 2021-07-26 NOTE — PROGRESS NOTES
ANTICOAGULATION MANAGEMENT     Renetta Reid 50 year old female is on warfarin with therapeutic INR result. (Goal INR 2.0-3.0)    Recent labs: (last 7 days)     07/22/21  1407   INR 3.0*       ASSESSMENT     Source(s): Patient/Caregiver Call       Warfarin doses taken: Warfarin taken as instructed    Diet: Pt states she is trying to be consistent with greens and that she averages about 2 servings per week.    New illness, injury, or hospitalization: No    Medication/supplement changes: None noted    Signs or symptoms of bleeding or clotting: No    Previous INR: Therapeutic last visit; previously outside of goal range    Additional findings: None     PLAN     Recommended plan for no diet, medication or health factor changes affecting INR     Dosing Instructions: Continue your current warfarin dose with next INR in 4 weeks       Summary  As of 7/22/2021    Full warfarin instructions:  2.5 mg every day   Next INR check:  8/19/2021             Telephone call with Renetta who verbalizes understanding and agrees to plan    Lab visit scheduled    Education provided: Importance of consistent vitamin K intake    Plan made per [facility] anticoagulation protocol    Maryann Gutierrez RN  Anticoagulation Clinic  7/26/2021    _______________________________________________________________________     Anticoagulation Episode Summary     Current INR goal:  2.0-3.0   TTR:  70.8 % (1 y)   Target end date:  Indefinite   Send INR reminders to:  [facility]    Indications    Long term current use of anticoagulants with INR goal of 2.0-3.0 [Z79.01]  Anti-cardiolipin antibody syndrome (H) [D68.61]           Comments:           Anticoagulation Care Providers     Provider Role Specialty Phone number    Hortencia Marcus MD Referring Internal Medicine [phone]

## 2021-09-06 DIAGNOSIS — D68.9 COAGULOPATHY (H): ICD-10-CM

## 2021-09-06 DIAGNOSIS — Z79.01 LONG TERM CURRENT USE OF ANTICOAGULANTS WITH INR GOAL OF 2.0-3.0: ICD-10-CM

## 2021-09-08 RX ORDER — WARFARIN SODIUM 2.5 MG/1
TABLET ORAL
Qty: 90 TABLET | Refills: 0 | Status: SHIPPED | OUTPATIENT
Start: 2021-09-08 | End: 2021-12-08

## 2021-09-08 NOTE — TELEPHONE ENCOUNTER
Pending Prescriptions:                       Disp   Refills    warfarin ANTICOAGULANT (COUMADIN) 2.5 MG t*90 tab*0        Sig: Take 1 tablet (2.5 mg) daily or as directed by INR           clinic.

## 2021-09-08 NOTE — TELEPHONE ENCOUNTER
Prescription approved per [facility] Refill Protocol.  Elaine Lu RN, BSN  Anticoagulation Clinic

## 2021-09-20 ENCOUNTER — TELEPHONE (OUTPATIENT)
Dept: ANTICOAGULATION | Facility: CLINIC | Age: 51
End: 2021-09-20

## 2021-09-20 NOTE — TELEPHONE ENCOUNTER
ANTICOAGULATION     Renetta Reid is overdue for INR check.      Left message for patient to call and schedule lab appointment as soon as possible. If returning call, please schedule.     Elaine Lu RN

## 2021-09-28 ENCOUNTER — TELEPHONE (OUTPATIENT)
Dept: ANTICOAGULATION | Facility: CLINIC | Age: 51
End: 2021-09-28

## 2021-09-28 NOTE — TELEPHONE ENCOUNTER
ANTICOAGULATION     Renetta Reid is overdue for INR check.      Left message for patient to call and schedule lab appointment as soon as possible. If returning call, please schedule.     Jennifer Matias RN

## 2021-10-14 ENCOUNTER — DOCUMENTATION ONLY (OUTPATIENT)
Dept: ANTICOAGULATION | Facility: CLINIC | Age: 51
End: 2021-10-14

## 2021-10-14 NOTE — PROGRESS NOTES
ANTICOAGULATION     Renetta Reid is overdue for INR check.      Reminder letter sent    Maryann Gutierrez RN

## 2021-10-14 NOTE — LETTER
[facility]      Renetta NATANAEL Reid  [id number]  [place] [address]      You are currently under the care of [facility] for your warfarin (Coumadin ) therapy.  We are contacting you because our records show you were due for an INR on 8/19/2021.    There are potentially serious risks when taking warfarin without careful monitoring and we want to make sure you are safely managed.  Routine INR monitoring is required for warfarin refills.     Please call [phone] as soon as possible to schedule an appointment.  If there has been a change in your care or other concerns, please let us know so we can help and or update our records.     Sincerely,       [facility]

## 2021-10-21 ENCOUNTER — DOCUMENTATION ONLY (OUTPATIENT)
Dept: ANTICOAGULATION | Facility: CLINIC | Age: 51
End: 2021-10-21

## 2021-10-21 ENCOUNTER — LAB (OUTPATIENT)
Dept: LAB | Facility: CLINIC | Age: 51
End: 2021-10-21
Payer: COMMERCIAL

## 2021-10-21 ENCOUNTER — ANTICOAGULATION THERAPY VISIT (OUTPATIENT)
Dept: ANTICOAGULATION | Facility: CLINIC | Age: 51
End: 2021-10-21

## 2021-10-21 DIAGNOSIS — Z79.01 LONG TERM CURRENT USE OF ANTICOAGULANTS WITH INR GOAL OF 2.0-3.0: Primary | ICD-10-CM

## 2021-10-21 DIAGNOSIS — D68.61 ANTI-CARDIOLIPIN ANTIBODY SYNDROME (H): Primary | ICD-10-CM

## 2021-10-21 DIAGNOSIS — Z79.01 LONG TERM CURRENT USE OF ANTICOAGULANTS WITH INR GOAL OF 2.0-3.0: ICD-10-CM

## 2021-10-21 DIAGNOSIS — D68.61 ANTI-CARDIOLIPIN ANTIBODY SYNDROME (H): ICD-10-CM

## 2021-10-21 DIAGNOSIS — D68.9 COAGULOPATHY (H): ICD-10-CM

## 2021-10-21 LAB — INR BLD: 5.2 (ref 0.9–1.1)

## 2021-10-21 PROCEDURE — 36416 COLLJ CAPILLARY BLOOD SPEC: CPT

## 2021-10-21 PROCEDURE — 85610 PROTHROMBIN TIME: CPT

## 2021-10-21 NOTE — PROGRESS NOTES
ANTICOAGULATION MANAGEMENT      Renetta Reid due for annual renewal of referral to anticoagulation monitoring. Order pended for your review and signature.      ANTICOAGULATION SUMMARY      Warfarin indication(s)     Anti-cardiolipin antibody syndrome     Heart valve present?  NO       Current goal range   INR: 2.0-3.0     Goal appropriate for indication? Yes, INR 2-3 appropriate for hx of DVT, PE, hypercoagulable state, Afib, LVAD, or bileaflet AVR without risk factors     Current duration of therapy Indefinite/long term therapy   Time in Therapeutic Range (TTR)  (Goal > 60%) 61.6%       Office visit with referring provider's group within last year yes on 11/16/20       Jennifer Matias RN

## 2021-10-21 NOTE — PROGRESS NOTES
ANTICOAGULATION MANAGEMENT     Renetta Reid 51 year old female is on warfarin with supratherapeutic INR result. (Goal INR 2.0-3.0)    Recent labs: (last 7 days)     10/21/21  1431   INR 5.2*       ASSESSMENT     Source(s): Chart Review and Patient/Caregiver Call       Warfarin doses taken: Warfarin taken as instructed    Diet: No new diet changes identified    New illness, injury, or hospitalization: Yes: back pain for a while.  Will be seeing her chiropractor next week.    Medication/supplement changes: Tylenol for back pain, but has been taking <2000 mg daily    Signs or symptoms of bleeding or clotting: No    Previous INR: Therapeutic last 2(+) visits    Additional findings: None     PLAN     Recommended plan for ongoing change(s) affecting INR     Dosing Instructions: Hold dose then Decrease your warfarin dose (14% change) with next INR in 1 week       Summary  As of 10/21/2021    Full warfarin instructions:  10/21: Hold; Otherwise 1.25 mg every Tue, Fri; 2.5 mg all other days   Next INR check:  10/28/2021             Telephone call with Renetta who agrees to plan and repeated back plan correctly    Lab visit scheduled    Education provided: Please call back if any changes to your diet, medications or how you've been taking warfarin, Impact of vitamin K foods on INR, Monitoring for bleeding signs and symptoms and Contact [phone]  with any changes, questions or concerns.     Plan made per [facility] anticoagulation protocol     Message routed to PCP as an FYI due to critical result.    Jennifer Matias RN  Anticoagulation Clinic  10/21/2021    _______________________________________________________________________     Anticoagulation Episode Summary     Current INR goal:  2.0-3.0   TTR:  61.6 % (9.1 mo)   Target end date:  Indefinite   [facility]    Indications    Long term current use of anticoagulants with INR goal of 2.0-3.0 [Z79.01]  Anti-cardiolipin antibody syndrome (H)  [K52.61]           Comments:           Anticoagulation Care Providers     Provider Role Specialty Phone number    Hortencia Marcus MD Referring Internal Medicine [phone]

## 2021-10-23 ENCOUNTER — HEALTH MAINTENANCE LETTER (OUTPATIENT)
Age: 51
End: 2021-10-23

## 2021-10-28 ENCOUNTER — ANTICOAGULATION THERAPY VISIT (OUTPATIENT)
Dept: NURSING | Facility: CLINIC | Age: 51
End: 2021-10-28

## 2021-10-28 ENCOUNTER — LAB (OUTPATIENT)
Dept: LAB | Facility: CLINIC | Age: 51
End: 2021-10-28
Payer: COMMERCIAL

## 2021-10-28 DIAGNOSIS — Z79.01 LONG TERM CURRENT USE OF ANTICOAGULANTS WITH INR GOAL OF 2.0-3.0: ICD-10-CM

## 2021-10-28 DIAGNOSIS — D68.61 ANTI-CARDIOLIPIN ANTIBODY SYNDROME (H): ICD-10-CM

## 2021-10-28 LAB — INR BLD: 2.1 (ref 0.9–1.1)

## 2021-10-28 PROCEDURE — 85610 PROTHROMBIN TIME: CPT

## 2021-10-28 PROCEDURE — 36416 COLLJ CAPILLARY BLOOD SPEC: CPT

## 2021-10-28 NOTE — PROGRESS NOTES
ANTICOAGULATION MANAGEMENT     Renetta Reid 51 year old female is on warfarin with therapeutic INR result. (Goal INR 2.0-3.0)    Recent labs: (last 7 days)     10/28/21  1543   INR 2.1*       ASSESSMENT     Source(s): Chart Review and Patient/Caregiver Call       Warfarin doses taken: Warfarin taken as instructed    Diet: No new diet changes identified    New illness, injury, or hospitalization: Taking tylenol for back pain    Medication/supplement changes: None noted    Signs or symptoms of bleeding or clotting: No    Previous INR: Supratherapeutic    Additional findings: None     PLAN     Recommended plan for ongoing change(s) affecting INR     Dosing Instructions: Continue your current warfarin dose with next INR in 2 weeks       Summary  As of 10/28/2021    Full warfarin instructions:  1.25 mg every Tue, Fri; 2.5 mg all other days   Next INR check:  11/11/2021             Telephone call with Renetta who agrees to plan and repeated back plan correctly    Lab visit scheduled    Education provided: Importance of notifying clinic for changes in medications; a sooner lab recheck maybe needed. and Contact [phone]  with any changes, questions or concerns.     Plan made per [facility] anticoagulation protocol    Jazmin Contreras, RN  Anticoagulation Clinic  10/28/2021    _______________________________________________________________________     Anticoagulation Episode Summary     Current INR goal:  2.0-3.0   TTR:  59.8 % (9.1 mo)   Target end date:  Indefinite   Send INR reminders to:  [facility]    Indications    Long term current use of anticoagulants with INR goal of 2.0-3.0 [Z79.01]  Anti-cardiolipin antibody syndrome (H) [D68.61]           Comments:           Anticoagulation Care Providers     Provider Role Specialty Phone number    Hortencia Marcus MD Referring Internal Medicine [phone]

## 2021-11-18 ENCOUNTER — ANTICOAGULATION THERAPY VISIT (OUTPATIENT)
Dept: ANTICOAGULATION | Facility: CLINIC | Age: 51
End: 2021-11-18

## 2021-11-18 ENCOUNTER — LAB (OUTPATIENT)
Dept: LAB | Facility: CLINIC | Age: 51
End: 2021-11-18
Payer: COMMERCIAL

## 2021-11-18 DIAGNOSIS — Z79.01 LONG TERM CURRENT USE OF ANTICOAGULANTS WITH INR GOAL OF 2.0-3.0: ICD-10-CM

## 2021-11-18 DIAGNOSIS — D68.61 ANTI-CARDIOLIPIN ANTIBODY SYNDROME (H): ICD-10-CM

## 2021-11-18 DIAGNOSIS — Z79.01 LONG TERM CURRENT USE OF ANTICOAGULANTS WITH INR GOAL OF 2.0-3.0: Primary | ICD-10-CM

## 2021-11-18 LAB — INR BLD: 3.5 (ref 0.9–1.1)

## 2021-11-18 PROCEDURE — 85610 PROTHROMBIN TIME: CPT

## 2021-11-18 PROCEDURE — 36416 COLLJ CAPILLARY BLOOD SPEC: CPT

## 2021-11-18 NOTE — PROGRESS NOTES
Left message to call [phone].     Anticoagulation Management    Unable to reach Renetta today.    Today's INR result of 3.5 is supratherapeutic (goal INR of 2.0-3.0).  Result received from: Clinic Lab    Follow up required to confirm warfarin dose taken and assess for changes    Left message to take reduced dose of warfarin, 1.5 mg tonight.      Anticoagulation clinic to follow up    Elaine Lu RN

## 2021-11-19 NOTE — PROGRESS NOTES
ANTICOAGULATION MANAGEMENT     Renetta Reid 51 year old female is on warfarin with supratherapeutic INR result. (Goal INR 2.0-3.0)    Recent labs: (last 7 days)     11/18/21  1537   INR 3.5*       ASSESSMENT     Source(s): Chart Review and Patient/Caregiver Call       Warfarin doses taken: Warfarin taken as instructed    Diet: No new diet changes identified    New illness, injury, or hospitalization: No    Medication/supplement changes: None noted    Signs or symptoms of bleeding or clotting: Yes: bruise where INR was drawn, but no other bleeding or bruising problems    Previous INR: Therapeutic last visit; previously outside of goal range.  INR has been supra therapeutic several times recently, so maintenance dose adjusted again today.    Additional findings: None     PLAN     Recommended plan for no diet, medication or health factor changes affecting INR     Dosing Instructions: Partial hold then Decrease your warfarin dose (8.3% change) with next INR in 2 weeks       Summary  As of 11/18/2021    Full warfarin instructions:  11/18: 1.25 mg; Otherwise 1.25 mg every Sun, Tue, Fri; 2.5 mg all other days   Next INR check:  12/2/2021             Telephone call with Renetta who agrees to plan and repeated back plan correctly    Lab visit scheduled    Education provided: Please call back if any changes to your diet, medications or how you've been taking warfarin    Plan made per [facility] anticoagulation protocol    Jennifer Mtaias RN  Anticoagulation Clinic  11/19/2021    _______________________________________________________________________     Anticoagulation Episode Summary     Current INR goal:  2.0-3.0   TTR:  56.9 % (9.1 mo)   Target end date:  Indefinite   Send INR reminders to:  [facility]    Indications    Long term current use of anticoagulants with INR goal of 2.0-3.0 [Z79.01]  Anti-cardiolipin antibody syndrome (H) [D68.61]           Comments:           Anticoagulation Care Providers     Provider  Role Specialty Phone number    Hortencia Marcus MD Referring Internal Medicine [phone]

## 2021-12-02 ENCOUNTER — ANTICOAGULATION THERAPY VISIT (OUTPATIENT)
Dept: ANTICOAGULATION | Facility: CLINIC | Age: 51
End: 2021-12-02

## 2021-12-02 ENCOUNTER — LAB (OUTPATIENT)
Dept: LAB | Facility: CLINIC | Age: 51
End: 2021-12-02
Payer: COMMERCIAL

## 2021-12-02 DIAGNOSIS — Z79.01 LONG TERM CURRENT USE OF ANTICOAGULANTS WITH INR GOAL OF 2.0-3.0: Primary | ICD-10-CM

## 2021-12-02 DIAGNOSIS — D68.61 ANTI-CARDIOLIPIN ANTIBODY SYNDROME (H): ICD-10-CM

## 2021-12-02 DIAGNOSIS — L50.9 URTICARIA: ICD-10-CM

## 2021-12-02 DIAGNOSIS — Z79.01 LONG TERM CURRENT USE OF ANTICOAGULANTS WITH INR GOAL OF 2.0-3.0: ICD-10-CM

## 2021-12-02 DIAGNOSIS — K21.9 GASTROESOPHAGEAL REFLUX DISEASE WITHOUT ESOPHAGITIS: ICD-10-CM

## 2021-12-02 LAB — INR BLD: 1.9 (ref 0.9–1.1)

## 2021-12-02 PROCEDURE — 85610 PROTHROMBIN TIME: CPT

## 2021-12-02 PROCEDURE — 36416 COLLJ CAPILLARY BLOOD SPEC: CPT

## 2021-12-02 RX ORDER — HYDROXYZINE HYDROCHLORIDE 10 MG/1
TABLET, FILM COATED ORAL
Qty: 90 TABLET | Refills: 0 | Status: SHIPPED | OUTPATIENT
Start: 2021-12-02 | End: 2022-03-01

## 2021-12-02 NOTE — PROGRESS NOTES
ANTICOAGULATION MANAGEMENT     Renetta Reid 51 year old female is on warfarin with subtherapeutic INR result. (Goal INR 2.0-3.0)    Recent labs: (last 7 days)     12/02/21  1532   INR 1.9*       ASSESSMENT     Source(s): Chart Review and Patient/Caregiver Call       Warfarin doses taken: Warfarin taken as instructed    Diet: Increased greens/vitamin K in diet; ongoing change, patient reports she had not been eating green veggies the last couple INR checks    New illness, injury, or hospitalization: No    Medication/supplement changes: None noted    Signs or symptoms of bleeding or clotting: No    Previous INR: Supratherapeutic    Additional findings: None     PLAN     Recommended plan for ongoing change(s) affecting INR     Dosing Instructions:  Increase your warfarin dose (9.1% change) with next INR in 2 weeks. Weekly warfarin maintenance dose increased 1.25 mg since patient reports she increased her green veggies and will continue with the same amount.      Summary  As of 12/2/2021    Full warfarin instructions:  1.25 mg every Tue, Fri; 2.5 mg all other days   Next INR check:  12/16/2021             Telephone call with Renetta who verbalizes understanding and agrees to plan    Lab visit scheduled    Education provided: Please call back if any changes to your diet, medications or how you've been taking warfarin and Contact [phone]  with any changes, questions or concerns.     Plan made per [facility] anticoagulation protocol    Elaine Lu RN  Anticoagulation Clinic  12/2/2021    _______________________________________________________________________     Anticoagulation Episode Summary     Current INR goal:  2.0-3.0   TTR:  55.1 % (9.1 mo)   Target end date:  Indefinite   Send INR reminders to:  [facility]    Indications    Long term current use of anticoagulants with INR goal of 2.0-3.0 [Z79.01]  Anti-cardiolipin antibody syndrome (H) [D68.61]           Comments:           Anticoagulation Care  Providers     Provider Role Specialty Phone number    Hortencia Marcus MD Referring Internal Medicine [phone]

## 2021-12-02 NOTE — TELEPHONE ENCOUNTER
Routing refill request to provider for review/approval because:  Patient needs to be seen because it has been more than 1 year since last office visit.  Was supposed to follow up around 5/16/21    Pending Prescriptions:                       Disp   Refills    omeprazole (PRILOSEC) 20 MG DR capsule [Ph*90 cap*3        Sig: TAKE 1 CAPSULE BY MOUTH EVERY DAY    hydrOXYzine (ATARAX) 10 MG tablet [Pharmac*90 tab*3        Sig: TAKE 1 TABLET BY MOUTH AT BEDTIME

## 2021-12-08 DIAGNOSIS — D68.9 COAGULOPATHY (H): ICD-10-CM

## 2021-12-08 DIAGNOSIS — Z79.01 LONG TERM CURRENT USE OF ANTICOAGULANTS WITH INR GOAL OF 2.0-3.0: ICD-10-CM

## 2021-12-08 RX ORDER — WARFARIN SODIUM 2.5 MG/1
TABLET ORAL
Qty: 90 TABLET | Refills: 0 | Status: SHIPPED | OUTPATIENT
Start: 2021-12-08 | End: 2022-03-07

## 2022-01-04 ENCOUNTER — ANTICOAGULATION THERAPY VISIT (OUTPATIENT)
Dept: ANTICOAGULATION | Facility: CLINIC | Age: 52
End: 2022-01-04

## 2022-01-04 ENCOUNTER — LAB (OUTPATIENT)
Dept: LAB | Facility: CLINIC | Age: 52
End: 2022-01-04
Payer: COMMERCIAL

## 2022-01-04 DIAGNOSIS — D68.61 ANTI-CARDIOLIPIN ANTIBODY SYNDROME (H): ICD-10-CM

## 2022-01-04 DIAGNOSIS — Z79.01 LONG TERM CURRENT USE OF ANTICOAGULANTS WITH INR GOAL OF 2.0-3.0: Primary | ICD-10-CM

## 2022-01-04 DIAGNOSIS — Z79.01 LONG TERM CURRENT USE OF ANTICOAGULANTS WITH INR GOAL OF 2.0-3.0: ICD-10-CM

## 2022-01-04 LAB — INR BLD: 2.3 (ref 0.9–1.1)

## 2022-01-04 PROCEDURE — 36416 COLLJ CAPILLARY BLOOD SPEC: CPT

## 2022-01-04 PROCEDURE — 85610 PROTHROMBIN TIME: CPT

## 2022-01-04 NOTE — PROGRESS NOTES
ANTICOAGULATION MANAGEMENT     Renetta Reid 51 year old female is on warfarin with therapeutic INR result. (Goal INR 2.0-3.0)    Recent labs: (last 7 days)     01/04/22  1546   INR 2.3*       ASSESSMENT     Source(s): Chart Review and Patient/Caregiver Call       Warfarin doses taken: Warfarin taken as instructed    Diet: No new diet changes identified    New illness, injury, or hospitalization: No    Medication/supplement changes: None noted    Signs or symptoms of bleeding or clotting: No    Previous INR: Subtherapeutic    Additional findings: None     PLAN     Recommended plan for no diet, medication or health factor changes affecting INR     Dosing Instructions: Continue your current warfarin dose with next INR in 4 weeks       Summary  As of 1/4/2022    Full warfarin instructions:  1.25 mg every Tue, Fri; 2.5 mg all other days   Next INR check:  2/1/2022             Telephone call with Renetta who verbalizes understanding and agrees to plan    Lab visit scheduled    Education provided: Please call back if any changes to your diet, medications or how you've been taking warfarin and Contact [phone]  with any changes, questions or concerns.     Plan made per [facility] anticoagulation protocol    Elaine Lu RN  Anticoagulation Clinic  1/4/2022    _______________________________________________________________________     Anticoagulation Episode Summary     Current INR goal:  2.0-3.0   TTR:  60.8 % (9.1 mo)   Target end date:  Indefinite   Send INR reminders to:  [facility]    Indications    Long term current use of anticoagulants with INR goal of 2.0-3.0 [Z79.01]  Anti-cardiolipin antibody syndrome (H) [D68.61]           Comments:           Anticoagulation Care Providers     Provider Role Specialty Phone number    Hortencia Marcus MD Referring Internal Medicine [phone]

## 2022-01-17 DIAGNOSIS — I10 BENIGN ESSENTIAL HYPERTENSION: ICD-10-CM

## 2022-01-20 NOTE — TELEPHONE ENCOUNTER
Please call patient again to schedule appointment, she was called in December and did not schedule.  I will wait to refill until she is scheduled.

## 2022-01-21 RX ORDER — AMLODIPINE BESYLATE 5 MG/1
TABLET ORAL
Qty: 90 TABLET | Refills: 0 | Status: SHIPPED | OUTPATIENT
Start: 2022-01-21 | End: 2022-04-22

## 2022-01-21 NOTE — TELEPHONE ENCOUNTER
Called patient and assisted in scheduling an appointment    Next 5 appointments (look out 90 days)    Mar 01, 2022  8:30 AM  (Arrive by 8:10 AM)  Adult Preventative Visit with Hortencia Marcus MD  [facility] ([facility] ) [address]  [facility] [id number] [phone]

## 2022-02-01 ENCOUNTER — LAB (OUTPATIENT)
Dept: LAB | Facility: CLINIC | Age: 52
End: 2022-02-01
Payer: COMMERCIAL

## 2022-02-01 ENCOUNTER — ANTICOAGULATION THERAPY VISIT (OUTPATIENT)
Dept: ANTICOAGULATION | Facility: CLINIC | Age: 52
End: 2022-02-01

## 2022-02-01 DIAGNOSIS — D68.61 ANTI-CARDIOLIPIN ANTIBODY SYNDROME (H): ICD-10-CM

## 2022-02-01 DIAGNOSIS — Z79.01 LONG TERM CURRENT USE OF ANTICOAGULANTS WITH INR GOAL OF 2.0-3.0: ICD-10-CM

## 2022-02-01 DIAGNOSIS — Z79.01 LONG TERM CURRENT USE OF ANTICOAGULANTS WITH INR GOAL OF 2.0-3.0: Primary | ICD-10-CM

## 2022-02-01 LAB — INR BLD: 2.4 (ref 0.9–1.1)

## 2022-02-01 PROCEDURE — 36416 COLLJ CAPILLARY BLOOD SPEC: CPT

## 2022-02-01 PROCEDURE — 85610 PROTHROMBIN TIME: CPT

## 2022-02-01 NOTE — PROGRESS NOTES
ANTICOAGULATION MANAGEMENT     Renetta Reid 51 year old female is on warfarin with therapeutic INR result. (Goal INR 2.0-3.0)    Recent labs: (last 7 days)     02/01/22  1541   INR 2.4*       ASSESSMENT     Source(s): Chart Review and Patient/Caregiver Call       Warfarin doses taken: Warfarin taken as instructed    Diet: No new diet changes identified - patient still has minimal smell/taste since getting COVID 10/2020    New illness, injury, or hospitalization: No    Medication/supplement changes: None noted    Signs or symptoms of bleeding or clotting: No    Previous INR: Therapeutic last visit; previously outside of goal range    Additional findings: None     PLAN     Recommended plan for no diet, medication or health factor changes affecting INR     Dosing Instructions: Continue your current warfarin dose with next INR in 4 weeks       Summary  As of 2/1/2022    Full warfarin instructions:  1.25 mg every Tue, Fri; 2.5 mg all other days   Next INR check:  3/1/2022             Telephone call with Renetta who verbalizes understanding and agrees to plan    Lab visit scheduled    Education provided: Please call back if any changes to your diet, medications or how you've been taking warfarin, Monitoring for bleeding signs and symptoms, Monitoring for clotting signs and symptoms and Importance of notifying clinic for changes in medications; a sooner lab recheck maybe needed.    Plan made per [facility] anticoagulation protocol    Virginia Patrick, RN  Anticoagulation Clinic  2/1/2022    _______________________________________________________________________     Anticoagulation Episode Summary     Current INR goal:  2.0-3.0   TTR:  71.0 % (9.1 mo)   Target end date:  Indefinite   Send INR reminders to:  [facility]    Indications    Long term current use of anticoagulants with INR goal of 2.0-3.0 [Z79.01]  Anti-cardiolipin antibody syndrome (H) [D68.61]           Comments:           Anticoagulation Care  Providers     Provider Role Specialty Phone number    Hortencia Marcus MD Referring Internal Medicine [phone]

## 2022-02-12 ENCOUNTER — HEALTH MAINTENANCE LETTER (OUTPATIENT)
Age: 52
End: 2022-02-12

## 2022-03-01 ENCOUNTER — NURSE TRIAGE (OUTPATIENT)
Dept: NURSING | Facility: CLINIC | Age: 52
End: 2022-03-01

## 2022-03-01 DIAGNOSIS — K21.9 GASTROESOPHAGEAL REFLUX DISEASE WITHOUT ESOPHAGITIS: ICD-10-CM

## 2022-03-01 DIAGNOSIS — L50.9 URTICARIA: ICD-10-CM

## 2022-03-01 RX ORDER — HYDROXYZINE HYDROCHLORIDE 10 MG/1
TABLET, FILM COATED ORAL
Qty: 90 TABLET | Refills: 0 | Status: SHIPPED | OUTPATIENT
Start: 2022-03-01 | End: 2022-04-14

## 2022-03-01 NOTE — TELEPHONE ENCOUNTER
Supposed to have a physical today, but the provider is ill.   Appt was rescheduled, next available is in 2 months.     Is out of 2 of her medications though   -hydroxyzine   -omeprazole    Refill request encounter has already been started.     Reason for Disposition    [1] Caller requesting a NON-URGENT new prescription or refill AND [2] triager unable to refill per unit policy    Protocols used: MEDICATION QUESTION CALL-A-    Pretty Guzman RN on 3/1/2022 at 7:17 AM

## 2022-03-01 NOTE — TELEPHONE ENCOUNTER
Pts provider called in ill today and her appt was rescheduled. Next available, 5/5/22.     Prescription approved per [facility] Refill Protocol.

## 2022-03-06 DIAGNOSIS — Z79.01 LONG TERM CURRENT USE OF ANTICOAGULANTS WITH INR GOAL OF 2.0-3.0: ICD-10-CM

## 2022-03-06 DIAGNOSIS — D68.9 COAGULOPATHY (H): ICD-10-CM

## 2022-03-07 RX ORDER — WARFARIN SODIUM 2.5 MG/1
TABLET ORAL
Qty: 90 TABLET | Refills: 0 | Status: SHIPPED | OUTPATIENT
Start: 2022-03-07 | End: 2022-06-02

## 2022-03-07 NOTE — TELEPHONE ENCOUNTER
Last INR: 2/1/22=2.4  Next INR: patient cancelled 3/1/22 lab--I spoke to patient, INR scheduled for 3/9/22  INR referral and OV up-to-date: patient rescheduled her annual physical to 5/5/22, provider was ill on 3/1/22      Prescription approved per [facility] Refill Protocol.    Maryann Gutierrez RN

## 2022-03-09 ENCOUNTER — LAB (OUTPATIENT)
Dept: LAB | Facility: CLINIC | Age: 52
End: 2022-03-09
Payer: COMMERCIAL

## 2022-03-09 ENCOUNTER — ANTICOAGULATION THERAPY VISIT (OUTPATIENT)
Dept: ANTICOAGULATION | Facility: CLINIC | Age: 52
End: 2022-03-09

## 2022-03-09 DIAGNOSIS — D68.61 ANTI-CARDIOLIPIN ANTIBODY SYNDROME (H): ICD-10-CM

## 2022-03-09 DIAGNOSIS — Z79.01 LONG TERM CURRENT USE OF ANTICOAGULANTS WITH INR GOAL OF 2.0-3.0: Primary | ICD-10-CM

## 2022-03-09 DIAGNOSIS — Z79.01 LONG TERM CURRENT USE OF ANTICOAGULANTS WITH INR GOAL OF 2.0-3.0: ICD-10-CM

## 2022-03-09 LAB — INR BLD: 2.4 (ref 0.9–1.1)

## 2022-03-09 PROCEDURE — 85610 PROTHROMBIN TIME: CPT

## 2022-03-09 PROCEDURE — 36416 COLLJ CAPILLARY BLOOD SPEC: CPT

## 2022-03-09 NOTE — PROGRESS NOTES
ANTICOAGULATION MANAGEMENT     Renetta Reid 51 year old female is on warfarin with therapeutic INR result. (Goal INR 2.0-3.0)    Recent labs: (last 7 days)     03/09/22  1543   INR 2.4*       ASSESSMENT       Source(s): Chart Review and Patient/Caregiver Call       Warfarin doses taken: Warfarin taken as instructed    Diet: No new diet changes identified    New illness, injury, or hospitalization: No    Medication/supplement changes: None noted    Signs or symptoms of bleeding or clotting: No    Previous INR: Therapeutic last 2(+) visits    Additional findings: None       PLAN     Recommended plan for no diet, medication or health factor changes affecting INR     Dosing Instructions: Continue your current warfarin dose with next INR in 4 weeks       Summary  As of 3/9/2022    Full warfarin instructions:  1.25 mg every Tue, Fri; 2.5 mg all other days   Next INR check:  4/4/2022             Telephone call with Renetta who verbalizes understanding and agrees to plan    Lab visit scheduled    Education provided: Please call back if any changes to your diet, medications or how you've been taking warfarin and Contact [phone]  with any changes, questions or concerns.     Plan made per [facility] anticoagulation protocol    Elaine Lu RN  Anticoagulation Clinic  3/9/2022    _______________________________________________________________________     Anticoagulation Episode Summary     Current INR goal:  2.0-3.0   TTR:  79.6 % (9.1 mo)   Target end date:  Indefinite   Send INR reminders to:  [facility]    Indications    Long term current use of anticoagulants with INR goal of 2.0-3.0 [Z79.01]  Anti-cardiolipin antibody syndrome (H) [D68.61]           Comments:           Anticoagulation Care Providers     Provider Role Specialty Phone number    Hortencia Marcus MD Referring Internal Medicine [phone]

## 2022-04-04 ENCOUNTER — ANCILLARY PROCEDURE (OUTPATIENT)
Dept: MAMMOGRAPHY | Facility: CLINIC | Age: 52
End: 2022-04-04
Attending: INTERNAL MEDICINE
Payer: COMMERCIAL

## 2022-04-04 ENCOUNTER — LAB (OUTPATIENT)
Dept: LAB | Facility: CLINIC | Age: 52
End: 2022-04-04

## 2022-04-04 ENCOUNTER — ANTICOAGULATION THERAPY VISIT (OUTPATIENT)
Dept: ANTICOAGULATION | Facility: CLINIC | Age: 52
End: 2022-04-04

## 2022-04-04 DIAGNOSIS — Z79.01 LONG TERM CURRENT USE OF ANTICOAGULANTS WITH INR GOAL OF 2.0-3.0: ICD-10-CM

## 2022-04-04 DIAGNOSIS — D68.61 ANTI-CARDIOLIPIN ANTIBODY SYNDROME (H): ICD-10-CM

## 2022-04-04 DIAGNOSIS — Z12.31 VISIT FOR SCREENING MAMMOGRAM: ICD-10-CM

## 2022-04-04 DIAGNOSIS — Z79.01 LONG TERM CURRENT USE OF ANTICOAGULANTS WITH INR GOAL OF 2.0-3.0: Primary | ICD-10-CM

## 2022-04-04 LAB — INR BLD: 2.5 (ref 0.9–1.1)

## 2022-04-04 PROCEDURE — 77067 SCR MAMMO BI INCL CAD: CPT | Mod: TC | Performed by: RADIOLOGY

## 2022-04-04 PROCEDURE — 36416 COLLJ CAPILLARY BLOOD SPEC: CPT

## 2022-04-04 PROCEDURE — 77063 BREAST TOMOSYNTHESIS BI: CPT | Mod: TC | Performed by: RADIOLOGY

## 2022-04-04 PROCEDURE — 85610 PROTHROMBIN TIME: CPT

## 2022-04-04 NOTE — PROGRESS NOTES
ANTICOAGULATION MANAGEMENT     Renetta Reid 51 year old female is on warfarin with therapeutic INR result. (Goal INR 2.0-3.0)    Recent labs: (last 7 days)     04/04/22  1038   INR 2.5*       ASSESSMENT       Source(s): Chart Review and Patient/Caregiver Call       Warfarin doses taken: Warfarin taken as instructed    Diet: No new diet changes identified    New illness, injury, or hospitalization: No    Medication/supplement changes: None noted    Signs or symptoms of bleeding or clotting: No    Previous INR: Therapeutic last 2(+) visits    Additional findings: None       PLAN     Recommended plan for no diet, medication or health factor changes affecting INR     Dosing Instructions: continue your current warfarin dose with next INR in 6 weeks       Summary  As of 4/4/2022    Full warfarin instructions:  1.25 mg every Tue, Fri; 2.5 mg all other days   Next INR check:  5/16/2022             Telephone call with Renetta who verbalizes understanding and agrees to plan    Lab visit scheduled    Education provided: Please call back if any changes to your diet, medications or how you've been taking warfarin and Contact [phone]  with any changes, questions or concerns.     Plan made per [facility] anticoagulation protocol    Elaine Lu RN  Anticoagulation Clinic  4/4/2022    _______________________________________________________________________     Anticoagulation Episode Summary     Current INR goal:  2.0-3.0   TTR:  79.6 % (9.1 mo)   Target end date:  Indefinite   Send INR reminders to:  [facility]    Indications    Long term current use of anticoagulants with INR goal of 2.0-3.0 [Z79.01]  Anti-cardiolipin antibody syndrome (H) [D68.61]           Comments:           Anticoagulation Care Providers     Provider Role Specialty Phone number    Hortencia Marcus MD Referring Internal Medicine [phone]

## 2022-04-11 ENCOUNTER — TELEPHONE (OUTPATIENT)
Dept: INTERNAL MEDICINE | Facility: CLINIC | Age: 52
End: 2022-04-11
Payer: COMMERCIAL

## 2022-04-11 DIAGNOSIS — L50.9 URTICARIA: ICD-10-CM

## 2022-04-11 NOTE — TELEPHONE ENCOUNTER
S-(situation): Patient calls with progressive red spots    B-(background): Patient has been seen for urticaria previously. Takes Hydroxyzine 10 at night to control itching.    A-(assessment): Patient calls requesting dose increase for hydroxyzine. Red spots are on bilateral calves and feet. Spots have begun to appear on Left hand and fore arm. Spots have increased in itching and burning sensation.     R-(recommendations): Patient requesting ok to increase hydroxyzine to 20 mg at night. Patient has future appointment scheduled.    Appointments in Next Year    May 05, 2022  8:30 AM  (Arrive by 8:10 AM)  Adult Preventative Visit with Hortencia Marcus MD  [facility] ([facility] ) [phone]   May 16, 2022  3:15 PM  INR LAB with LV LAB  [facility] ([facility] ) [phone]

## 2022-04-14 RX ORDER — HYDROXYZINE HYDROCHLORIDE 10 MG/1
20 TABLET, FILM COATED ORAL
Refills: 0 | Status: CANCELLED | OUTPATIENT
Start: 2022-04-14

## 2022-04-14 RX ORDER — HYDROXYZINE HYDROCHLORIDE 10 MG/1
20 TABLET, FILM COATED ORAL AT BEDTIME
Qty: 180 TABLET | Refills: 3 | Status: SHIPPED | OUTPATIENT
Start: 2022-04-14 | End: 2023-04-21

## 2022-04-14 NOTE — TELEPHONE ENCOUNTER
Call to pt. She states rash is a lot better since Monday when she called. She would like to take 2 Hydroxyzine together at night as this helps.   She did not go back to Derm as it is exactly the same as it always is when first diagnosed as the Henoch-Schonlein Purpura.   She has appt with Dr Marcus on 5/5 and can discuss with her then.     Pended Rx for 2 at night, if acceptable.

## 2022-04-14 NOTE — TELEPHONE ENCOUNTER
Please get more information about what this looks like and suggest she might try to send a picture.  I am concerned it could be a recurrence of her Henoch-Schönlein purpura or some other condition.  Ask if she had seen dermatology for that in the past, may need to refer her again.    If she looking to take 2 hydroxyzine at a time or one more often during the day?

## 2022-05-16 ENCOUNTER — ANTICOAGULATION THERAPY VISIT (OUTPATIENT)
Dept: ANTICOAGULATION | Facility: CLINIC | Age: 52
End: 2022-05-16

## 2022-05-16 ENCOUNTER — LAB (OUTPATIENT)
Dept: LAB | Facility: CLINIC | Age: 52
End: 2022-05-16
Payer: COMMERCIAL

## 2022-05-16 DIAGNOSIS — D68.61 ANTI-CARDIOLIPIN ANTIBODY SYNDROME (H): ICD-10-CM

## 2022-05-16 DIAGNOSIS — Z79.01 LONG TERM CURRENT USE OF ANTICOAGULANTS WITH INR GOAL OF 2.0-3.0: Primary | ICD-10-CM

## 2022-05-16 DIAGNOSIS — Z79.01 LONG TERM CURRENT USE OF ANTICOAGULANTS WITH INR GOAL OF 2.0-3.0: ICD-10-CM

## 2022-05-16 LAB — INR BLD: 2.4 (ref 0.9–1.1)

## 2022-05-16 PROCEDURE — 85610 PROTHROMBIN TIME: CPT

## 2022-05-16 PROCEDURE — 36415 COLL VENOUS BLD VENIPUNCTURE: CPT

## 2022-05-16 NOTE — PROGRESS NOTES
ANTICOAGULATION MANAGEMENT     Renetta Reid 51 year old female is on warfarin with therapeutic INR result. (Goal INR 2.0-3.0)    Recent labs: (last 7 days)     05/16/22  1522   INR 2.4*       ASSESSMENT       Source(s): Chart Review and Patient/Caregiver Call       Warfarin doses taken: Warfarin taken as instructed    Diet: No new diet changes identified    New illness, injury, or hospitalization: No    Medication/supplement changes: hydroxyzine dose increased on 4/14/22 No interaction anticipated    Signs or symptoms of bleeding or clotting: No    Previous INR: Therapeutic last 2(+) visits    Additional findings: None       PLAN     Recommended plan for no diet, medication or health factor changes affecting INR     Dosing Instructions: continue your current warfarin dose with next INR in 6 weeks       Summary  As of 5/16/2022    Full warfarin instructions:  1.25 mg every Tue, Fri; 2.5 mg all other days   Next INR check:  6/27/2022             Telephone call with Renetta who verbalizes understanding and agrees to plan    Lab visit scheduled    Education provided: Please call back if any changes to your diet, medications or how you've been taking warfarin and Contact [phone]  with any changes, questions or concerns.     Plan made per [facility] anticoagulation protocol    Elaine Lu RN  Anticoagulation Clinic  5/16/2022    _______________________________________________________________________     Anticoagulation Episode Summary     Current INR goal:  2.0-3.0   TTR:  79.6 % (9.1 mo)   Target end date:  Indefinite   Send INR reminders to:  [facility]    Indications    Long term current use of anticoagulants with INR goal of 2.0-3.0 [Z79.01]  Anti-cardiolipin antibody syndrome (H) [D68.61]           Comments:           Anticoagulation Care Providers     Provider Role Specialty Phone number    Hortencia Marcus MD Referring Internal Medicine [phone]

## 2022-05-28 DIAGNOSIS — K21.9 GASTROESOPHAGEAL REFLUX DISEASE WITHOUT ESOPHAGITIS: ICD-10-CM

## 2022-05-31 DIAGNOSIS — D68.9 COAGULOPATHY (H): ICD-10-CM

## 2022-05-31 DIAGNOSIS — I10 BENIGN ESSENTIAL HYPERTENSION: ICD-10-CM

## 2022-05-31 DIAGNOSIS — Z79.01 LONG TERM CURRENT USE OF ANTICOAGULANTS WITH INR GOAL OF 2.0-3.0: ICD-10-CM

## 2022-06-01 NOTE — TELEPHONE ENCOUNTER
Routing refill request to provider for review/approval because:  Patient needs to be seen because it has been more than 1 year since last office visit.    Has upcoming appointment    Next 5 appointments (look out 90 days)      Jul 12, 2022  9:00 AM  (Arrive by 8:40 AM)  Adult Preventative Visit with Hortencia Marcus MD  [facility] ([facility] ) [address] [facility] [id number] [phone]

## 2022-06-02 RX ORDER — WARFARIN SODIUM 2.5 MG/1
TABLET ORAL
Qty: 30 TABLET | Refills: 0 | Status: SHIPPED | OUTPATIENT
Start: 2022-06-02 | End: 2022-06-29

## 2022-06-02 RX ORDER — AMLODIPINE BESYLATE 5 MG/1
TABLET ORAL
Qty: 90 TABLET | Refills: 0 | Status: SHIPPED | OUTPATIENT
Start: 2022-06-02 | End: 2022-07-23

## 2022-06-02 NOTE — TELEPHONE ENCOUNTER
ANTICOAGULATION MANAGEMENT:  Medication Refill    Anticoagulation Summary  As of 5/16/2022    Warfarin maintenance plan:  1.25 mg (2.5 mg x 0.5) every Tue, Fri; 2.5 mg (2.5 mg x 1) all other days   Next INR check:  6/27/2022   Target end date:  Indefinite    Indications    Long term current use of anticoagulants with INR goal of 2.0-3.0 [Z79.01]  Anti-cardiolipin antibody syndrome (H) [D68.61]             Anticoagulation Care Providers     Provider Role Specialty Phone number    Hortencia Marcus MD Referring Internal Medicine [phone]          Visit with referring provider/group within last year: No, last visit date: 11/16/2020 but patient has appointment scheduled for 7/12/2022    [facility] referral signed within last year: Yes    Renetta does NOT meet all criteria for refill: Office visit with referring provider's group was >= 1 year ago. 30 day delano fill approved; patient notified to schedule per [facility] protocol    Elaine Lu RN  Anticoagulation Clinic

## 2022-06-02 NOTE — TELEPHONE ENCOUNTER
Amlodipine Besylate 5 mg Tab  Routing refill request to provider for review/approval because:  Labs not current:  Serum Creatinine  Patient needs to be seen because it has been more than 1 year since last office visit.  Blood Pressure out of range for [facility] refill protocol.    BP Readings from Last 3 Encounters:   11/16/20 130/75   11/12/19 (!) 160/88   08/07/19 (!) 160/90       Warfarin Sodium 2.5 mg tablet  Routing refill request to provider for review/approval because:  Directed by INR Clinic        Appointments in Next Year      Jun 27, 2022  3:15 PM  INR LAB with LV LAB  [facility] ([facility] ) [phone]     Jul 12, 2022  9:00 AM  (Arrive by 8:40 AM)  Adult Preventative Visit with Hortencia Marcus MD  [facility] ([facility] ) [phone]

## 2022-06-28 ENCOUNTER — LAB (OUTPATIENT)
Dept: LAB | Facility: CLINIC | Age: 52
End: 2022-06-28
Payer: COMMERCIAL

## 2022-06-28 ENCOUNTER — ANTICOAGULATION THERAPY VISIT (OUTPATIENT)
Dept: ANTICOAGULATION | Facility: CLINIC | Age: 52
End: 2022-06-28

## 2022-06-28 DIAGNOSIS — D68.61 ANTI-CARDIOLIPIN ANTIBODY SYNDROME (H): ICD-10-CM

## 2022-06-28 DIAGNOSIS — Z79.01 LONG TERM CURRENT USE OF ANTICOAGULANTS WITH INR GOAL OF 2.0-3.0: ICD-10-CM

## 2022-06-28 DIAGNOSIS — D68.9 COAGULOPATHY (H): ICD-10-CM

## 2022-06-28 DIAGNOSIS — Z79.01 LONG TERM CURRENT USE OF ANTICOAGULANTS WITH INR GOAL OF 2.0-3.0: Primary | ICD-10-CM

## 2022-06-28 LAB — INR BLD: 1.7 (ref 0.9–1.1)

## 2022-06-28 PROCEDURE — 85610 PROTHROMBIN TIME: CPT

## 2022-06-28 PROCEDURE — 36416 COLLJ CAPILLARY BLOOD SPEC: CPT

## 2022-06-28 NOTE — PROGRESS NOTES
ANTICOAGULATION MANAGEMENT     Renetta Reid 51 year old female is on warfarin with subtherapeutic INR result. (Goal INR 2.0-3.0)    Recent labs: (last 7 days)     06/28/22  1536   INR 1.7*       ASSESSMENT       Source(s): Chart Review and Patient/Caregiver Call       Warfarin doses taken: Warfarin taken as instructed    Diet: Increased greens/vitamin K in diet; plans to resume previous intake--patient states she had a large salad last night, typically she has more of a side salad size.  Patient also states she may not be consistent with greens but she does enjoy them.    New illness, injury, or hospitalization: No    Medication/supplement changes: None noted    Signs or symptoms of bleeding or clotting: No    Previous INR: Therapeutic last 2(+) visits    Additional findings: None       PLAN     Recommended plan for temporary change(s) affecting INR     Dosing Instructions: booster dose then continue your current warfarin dose with next INR in 2 weeks       Summary  As of 6/28/2022    Full warfarin instructions:  6/28: 2.5 mg; Otherwise 1.25 mg every Tue, Fri; 2.5 mg all other days   Next INR check:  7/12/2022             Telephone call with Renetta who verbalizes understanding and agrees to plan    Check at provider office visit    Education provided: Importance of consistent vitamin K intake and Contact [phone]  with any changes, questions or concerns.     Plan made per [facility] anticoagulation protocol    Maryann Gutierrez RN  Anticoagulation Clinic  6/28/2022    _______________________________________________________________________     Anticoagulation Episode Summary     Current INR goal:  2.0-3.0   TTR:  83.8 % (9.1 mo)   Target end date:  Indefinite   Send INR reminders to:  [facility]    Indications    Long term current use of anticoagulants with INR goal of 2.0-3.0 [Z79.01]  Anti-cardiolipin antibody syndrome (H) [D68.61]           Comments:           Anticoagulation Care Providers     Provider  Role Specialty Phone number    Hortencia Marcus MD Referring Internal Medicine [phone]

## 2022-06-29 RX ORDER — WARFARIN SODIUM 2.5 MG/1
TABLET ORAL
Qty: 30 TABLET | Refills: 0 | Status: SHIPPED | OUTPATIENT
Start: 2022-06-29 | End: 2022-07-29

## 2022-06-29 NOTE — TELEPHONE ENCOUNTER
ANTICOAGULATION MANAGEMENT:  Medication Refill    Anticoagulation Summary  As of 6/28/2022    Warfarin maintenance plan:  1.25 mg (2.5 mg x 0.5) every Tue, Fri; 2.5 mg (2.5 mg x 1) all other days   Next INR check:  7/12/2022   Target end date:  Indefinite    Indications    Long term current use of anticoagulants with INR goal of 2.0-3.0 [Z79.01]  Anti-cardiolipin antibody syndrome (H) [D68.61]             Anticoagulation Care Providers     Provider Role Specialty Phone number    Hortencia Marcus MD Referring Internal Medicine [phone]          Visit with referring provider/group within last year: No, last visit date: 11/16/20.  Has appointment scheduled 7/16/22 for physical     [facility] referral signed within last year: Yes    Renetta meets all criteria for refill (current [facility] referral, office visit with referring provider/group in last year, lab monitoring up to date or not exceeding 2 weeks overdue). Rx instructions and quantity match patient's current dosing plan. 30 day supply with 0 refills granted per [facility] protocol     Jennifer Matias RN  Anticoagulation Clinic

## 2022-07-11 ASSESSMENT — ENCOUNTER SYMPTOMS
COUGH: 0
DIARRHEA: 0
HEMATOCHEZIA: 0
CONSTIPATION: 0
PALPITATIONS: 0
PARESTHESIAS: 1
SORE THROAT: 0
NERVOUS/ANXIOUS: 0
CHILLS: 0
EYE PAIN: 0
DIZZINESS: 0
JOINT SWELLING: 1
MYALGIAS: 0
FEVER: 0
DYSURIA: 0
HEARTBURN: 0
HEMATURIA: 0
WEAKNESS: 0
NAUSEA: 0
SHORTNESS OF BREATH: 0
HEADACHES: 0
BREAST MASS: 0
ABDOMINAL PAIN: 0
FREQUENCY: 0
ARTHRALGIAS: 0

## 2022-07-11 ASSESSMENT — PATIENT HEALTH QUESTIONNAIRE - PHQ9
SUM OF ALL RESPONSES TO PHQ QUESTIONS 1-9: 3
10. IF YOU CHECKED OFF ANY PROBLEMS, HOW DIFFICULT HAVE THESE PROBLEMS MADE IT FOR YOU TO DO YOUR WORK, TAKE CARE OF THINGS AT HOME, OR GET ALONG WITH OTHER PEOPLE: NOT DIFFICULT AT ALL
SUM OF ALL RESPONSES TO PHQ QUESTIONS 1-9: 3

## 2022-07-12 ENCOUNTER — OFFICE VISIT (OUTPATIENT)
Dept: INTERNAL MEDICINE | Facility: CLINIC | Age: 52
End: 2022-07-12
Payer: COMMERCIAL

## 2022-07-12 ENCOUNTER — ANTICOAGULATION THERAPY VISIT (OUTPATIENT)
Dept: ANTICOAGULATION | Facility: CLINIC | Age: 52
End: 2022-07-12

## 2022-07-12 VITALS
TEMPERATURE: 97.7 F | DIASTOLIC BLOOD PRESSURE: 94 MMHG | HEART RATE: 121 BPM | WEIGHT: 250.2 LBS | BODY MASS INDEX: 46.04 KG/M2 | SYSTOLIC BLOOD PRESSURE: 142 MMHG | RESPIRATION RATE: 18 BRPM | HEIGHT: 62 IN | OXYGEN SATURATION: 98 %

## 2022-07-12 DIAGNOSIS — Z00.00 ENCOUNTER FOR ROUTINE ADULT HEALTH EXAMINATION WITHOUT ABNORMAL FINDINGS: Primary | ICD-10-CM

## 2022-07-12 DIAGNOSIS — K21.9 GASTROESOPHAGEAL REFLUX DISEASE WITHOUT ESOPHAGITIS: ICD-10-CM

## 2022-07-12 DIAGNOSIS — Z12.11 SCREEN FOR COLON CANCER: ICD-10-CM

## 2022-07-12 DIAGNOSIS — I10 BENIGN ESSENTIAL HYPERTENSION: ICD-10-CM

## 2022-07-12 DIAGNOSIS — Z79.01 LONG TERM CURRENT USE OF ANTICOAGULANTS WITH INR GOAL OF 2.0-3.0: Primary | ICD-10-CM

## 2022-07-12 DIAGNOSIS — D68.61 ANTI-CARDIOLIPIN ANTIBODY SYNDROME (H): ICD-10-CM

## 2022-07-12 DIAGNOSIS — Z79.01 LONG TERM CURRENT USE OF ANTICOAGULANTS WITH INR GOAL OF 2.0-3.0: ICD-10-CM

## 2022-07-12 DIAGNOSIS — E66.01 MORBID OBESITY (H): ICD-10-CM

## 2022-07-12 DIAGNOSIS — D69.0 HSP (HENOCH SCHONLEIN PURPURA) (H): ICD-10-CM

## 2022-07-12 DIAGNOSIS — Z12.4 CERVICAL CANCER SCREENING: ICD-10-CM

## 2022-07-12 DIAGNOSIS — N92.4 EXCESSIVE BLEEDING IN PREMENOPAUSAL PERIOD: ICD-10-CM

## 2022-07-12 DIAGNOSIS — Z13.6 CARDIOVASCULAR SCREENING; LDL GOAL LESS THAN 130: ICD-10-CM

## 2022-07-12 PROBLEM — F33.0 MILD EPISODE OF RECURRENT MAJOR DEPRESSIVE DISORDER (H): Status: RESOLVED | Noted: 2019-06-14 | Resolved: 2022-07-12

## 2022-07-12 LAB — INR BLD: 3.2 (ref 0.9–1.1)

## 2022-07-12 PROCEDURE — 80048 BASIC METABOLIC PNL TOTAL CA: CPT | Performed by: INTERNAL MEDICINE

## 2022-07-12 PROCEDURE — 87624 HPV HI-RISK TYP POOLED RSLT: CPT | Performed by: INTERNAL MEDICINE

## 2022-07-12 PROCEDURE — 82043 UR ALBUMIN QUANTITATIVE: CPT | Performed by: INTERNAL MEDICINE

## 2022-07-12 PROCEDURE — 85610 PROTHROMBIN TIME: CPT | Performed by: INTERNAL MEDICINE

## 2022-07-12 PROCEDURE — 91306 COVID-19,PF,MODERNA (18+ YRS BOOSTER .25ML): CPT | Performed by: INTERNAL MEDICINE

## 2022-07-12 PROCEDURE — 99214 OFFICE O/P EST MOD 30 MIN: CPT | Mod: 25 | Performed by: INTERNAL MEDICINE

## 2022-07-12 PROCEDURE — 99396 PREV VISIT EST AGE 40-64: CPT | Mod: 25 | Performed by: INTERNAL MEDICINE

## 2022-07-12 PROCEDURE — 80061 LIPID PANEL: CPT | Performed by: INTERNAL MEDICINE

## 2022-07-12 PROCEDURE — 36415 COLL VENOUS BLD VENIPUNCTURE: CPT | Performed by: INTERNAL MEDICINE

## 2022-07-12 PROCEDURE — 0064A COVID-19,PF,MODERNA (18+ YRS BOOSTER .25ML): CPT | Performed by: INTERNAL MEDICINE

## 2022-07-12 PROCEDURE — G0145 SCR C/V CYTO,THINLAYER,RESCR: HCPCS | Performed by: INTERNAL MEDICINE

## 2022-07-12 RX ORDER — MEDROXYPROGESTERONE ACETATE 10 MG
TABLET ORAL
Qty: 30 TABLET | Refills: 0 | Status: SHIPPED | OUTPATIENT
Start: 2022-07-12 | End: 2024-04-25

## 2022-07-12 ASSESSMENT — ENCOUNTER SYMPTOMS
DYSURIA: 0
NAUSEA: 0
BREAST MASS: 0
EYE PAIN: 0
FEVER: 0
COUGH: 0
DIZZINESS: 0
ABDOMINAL PAIN: 0
FREQUENCY: 0
HEMATOCHEZIA: 0
HEADACHES: 0
CONSTIPATION: 0
PALPITATIONS: 0
HEMATURIA: 0
SORE THROAT: 0
PARESTHESIAS: 1
ARTHRALGIAS: 0
JOINT SWELLING: 1
NERVOUS/ANXIOUS: 0
DIARRHEA: 0
MYALGIAS: 0
WEAKNESS: 0
SHORTNESS OF BREATH: 0
CHILLS: 0
HEARTBURN: 0

## 2022-07-12 NOTE — NURSING NOTE
"BP (!) 142/94 (BP Location: Right arm, Patient Position: Sitting)   Pulse (!) 121   Temp 97.7  F (36.5  C) (Oral)   Resp 18   Ht 1.575 m (5' 2\")   Wt 113.5 kg (250 lb 3.2 oz)   LMP 07/03/2022   SpO2 98%   BMI 45.76 kg/m      "

## 2022-07-12 NOTE — PATIENT INSTRUCTIONS
Check BP a few times over the next few weeks and send me results by mychart or schedule nurse BP check in 3 weeks.

## 2022-07-12 NOTE — PROGRESS NOTES
ANTICOAGULATION MANAGEMENT     Renetta Reid 51 year old female is on warfarin with supratherapeutic INR result. (Goal INR 2.0-3.0)    Recent labs: (last 7 days)     07/12/22  1002   INR 3.2*       ASSESSMENT       Source(s): Patient/Caregiver Call       Warfarin doses taken: Warfarin taken as instructed    Diet: No new diet changes identified    New illness, injury, or hospitalization: No    Medication/supplement changes: None noted    Signs or symptoms of bleeding or clotting: No    Previous INR: Subtherapeutic.  INR fluctuated significantly with no reported changes.    Additional findings: None       PLAN     Recommended plan for no diet, medication or health factor changes affecting INR     Dosing Instructions: hold dose then continue your current warfarin dose with next INR in 2 weeks       Summary  As of 7/12/2022    Full warfarin instructions:  7/12: Hold; Otherwise 1.25 mg every Tue, Fri; 2.5 mg all other days   Next INR check:  7/26/2022             Telephone call with Renetta who agrees to plan and repeated back plan correctly    Lab visit scheduled    Education provided: Please call back if any changes to your diet, medications or how you've been taking warfarin    Plan made per [facility] anticoagulation protocol    Jennifer Matias, RN  Anticoagulation Clinic  7/12/2022    _______________________________________________________________________     Anticoagulation Episode Summary     Current INR goal:  2.0-3.0   TTR:  82.1 % (9.1 mo)   Target end date:  Indefinite   Send INR reminders to:  [facility]    Indications    Long term current use of anticoagulants with INR goal of 2.0-3.0 [Z79.01]  Anti-cardiolipin antibody syndrome (H) [D68.61]           Comments:           Anticoagulation Care Providers     Provider Role Specialty Phone number    Hortencia Marcus MD Referring Internal Medicine [phone]

## 2022-07-12 NOTE — PROGRESS NOTES
ANTICOAGULATION MANAGEMENT     Renetta Reid 51 year old female is on warfarin with supratherapeutic INR result. (Goal INR 2.0-3.0)    Recent labs: (last 7 days)     07/12/22  1002   INR 3.2*       ASSESSMENT       Source(s): Chart Review    Previous INR was Supratherapeutic    Medication, diet, health changes since last INR chart reviewed; none identified     MD visit today.  Notes are not completed, but no changes noted at this time.           PLAN     Unable to reach Renetta today.    Left message for patient to call INR Clinic.    Follow up required to confirm warfarin dose taken and assess for changes    Jennifer Matias, RN  Anticoagulation Clinic  7/12/2022

## 2022-07-12 NOTE — PROGRESS NOTES
SUBJECTIVE:   CC: Renetta Reid is an 51 year old woman who presents for preventive health visit.       Patient has been advised of split billing requirements and indicates understanding: Yes  Healthy Habits:     Getting at least 3 servings of Calcium per day:  Yes    Bi-annual eye exam:  NO    Dental care twice a year:  Yes    Sleep apnea or symptoms of sleep apnea:  None    Diet:  Regular (no restrictions)    Frequency of exercise:  None    Taking medications regularly:  Yes    Medication side effects:  None    PHQ-2 Total Score: 1    Additional concerns today:  Yes    Ability to successfully perform activities of daily living: Yes, no assistance needed  Home safety:  none identified   Hearing impairment: None     Problems:  1.  She has been having outbreak of Henoch Schoenlein purpura again she believes.  This started in the wintertime, began on the feet and lower legs, spread upward.  She also had had involvement of her arms, especially left forearm.  It is like before that it is very itchy, gets red slightly raised bumps.  She had been using hydroxyzine which helps.  It has improved somewhat over the last couple months.  2.  Hypertension: She has not done any outside checks, last reading here was in 11/2020.  3.  Morbid obesity: She has lost 13 pounds in the past 18 months or so.  4.  Acid reflux: Stable on medication  5.  Anticardiolipin antibody syndrome: Remains on warfarin.    Current concerns:  She reports she has been having issues with heavier menopausal bleeding.  Her cycles are still regular, about day 3-4 she will tend to have very heavy bleeding, has to change her pad multiple times a day.  The duration of the cycle is fairly normal.  She does have cramps the first day which may be a little worse.  She has no spotting in between her menses.  She is not really having significant menopausal symptoms.    Patient Active Problem List   Diagnosis     Calculus of kidney     CARDIOVASCULAR SCREENING; LDL  GOAL LESS THAN 130     Anti-cardiolipin antibody syndrome (H)     HSP (Henoch Schonlein purpura) (H)     Gastroesophageal reflux disease without esophagitis     Long term current use of anticoagulants with INR goal of 2.0-3.0     Morbid obesity (H)     Anxiety     Benign essential hypertension     Urticaria     Current Outpatient Medications   Medication Sig Dispense Refill     amLODIPine (NORVASC) 5 MG tablet TAKE 1 TABLET BY MOUTH EVERY DAY 90 tablet 0     hydrOXYzine (ATARAX) 10 MG tablet Take 2 tablets (20 mg) by mouth At Bedtime 180 tablet 3     omeprazole (PRILOSEC) 20 MG DR capsule TAKE 1 CAPSULE BY MOUTH EVERY DAY 90 capsule 0     warfarin ANTICOAGULANT (COUMADIN) 2.5 MG tablet TAKE 1/2 TABLET BY MOUTH ON TUES AND FRIDAY, AND 1 TABLET ALL OTHER DAYS OR AS DIRECTED BY INRCLINIC 30 tablet 0          Today's PHQ-2 Score:   PHQ-2 ( 1999 Pfizer) 7/11/2022   Q1: Little interest or pleasure in doing things 0   Q2: Feeling down, depressed or hopeless 1   PHQ-2 Score 1   PHQ-2 Total Score (12-17 Years)- Positive if 3 or more points; Administer PHQ-A if positive -   Q1: Little interest or pleasure in doing things Not at all   Q2: Feeling down, depressed or hopeless Several days   PHQ-2 Score 1       Abuse: Current or Past (Physical, Sexual or Emotional) - No  Do you feel safe in your environment? Yes    Have you ever done Advance Care Planning? (For example, a Health Directive, POLST, or a discussion with a medical provider or your loved ones about your wishes): No, advance care planning information given to patient to review.  Patient plans to discuss their wishes with loved ones or provider.      Social History     Tobacco Use     Smoking status: Never Smoker     Smokeless tobacco: Never Used   Substance Use Topics     Alcohol use: Yes     Comment: socially     If you drink alcohol do you typically have >3 drinks per day or >7 drinks per week? No    Alcohol Use 7/11/2022   Prescreen: >3 drinks/day or >7 drinks/week?  Yes   Prescreen: >3 drinks/day or >7 drinks/week? -   AUDIT SCORE  6     AUDIT - Alcohol Use Disorders Identification Test - Reproduced from the World Health Organization Audit 2001 (Second Edition) 7/11/2022   1.  How often do you have a drink containing alcohol? 2 to 3 times a week   2.  How many drinks containing alcohol do you have on a typical day when you are drinking? 3 or 4   3.  How often do you have five or more drinks on one occasion? Monthly   4.  How often during the last year have you found that you were not able to stop drinking once you had started? Never   5.  How often during the last year have you failed to do what was normally expected of you because of drinking? Never   6.  How often during the last year have you needed a first drink in the morning to get yourself going after a heavy drinking session? Never   7.  How often during the last year have you had a feeling of guilt or remorse after drinking? Never   8.  How often during the last year have you been unable to remember what happened the night before because of your drinking? Never   9.  Have you or someone else been injured because of your drinking? No   10. Has a relative, friend, doctor or other health care worker been concerned about your drinking or suggested you cut down? No   TOTAL SCORE 6       Reviewed orders with patient.  Reviewed health maintenance and updated orders accordingly - Yes      Breast Cancer Screening:    Breast CA Risk Assessment (FHS-7) 7/11/2022   Do you have a family history of breast, colon, or ovarian cancer? No / Unknown       click delete button to remove this line now  Mammogram Screening: Recommended annual mammography  Pertinent mammograms are reviewed under the imaging tab.    History of abnormal Pap smear: NO - age 30-65 PAP every 5 years with negative HPV co-testing recommended  PAP / HPV Latest Ref Rng & Units 2/7/2017 10/25/2013 7/26/2010   PAP (Historical) - NIL NIL NIL   HPV16 NEG Negative - -  "  HPV18 NEG Negative - -   HRHPV NEG Negative - -     Reviewed and updated as needed this visit by clinical staff   Tobacco     Med Hx  Surg Hx  Fam Hx  Soc Hx          Reviewed and updated as needed this visit by Provider      Review of Systems   Constitutional: Negative for chills and fever.   HENT: Negative for congestion, ear pain, hearing loss and sore throat.    Eyes: Positive for visual disturbance. Negative for pain.   Respiratory: Negative for cough and shortness of breath.    Cardiovascular: Positive for peripheral edema. Negative for chest pain and palpitations.   Gastrointestinal: Negative for abdominal pain, constipation, diarrhea, heartburn, hematochezia and nausea.   Breasts:  Negative for tenderness, breast mass and discharge.   Genitourinary: Negative for dysuria, frequency, genital sores, hematuria, pelvic pain, urgency, vaginal bleeding and vaginal discharge.   Musculoskeletal: Positive for joint swelling. Negative for arthralgias and myalgias.   Skin: Positive for rash.   Neurological: Positive for paresthesias. Negative for dizziness, weakness and headaches.   Psychiatric/Behavioral: Negative for mood changes. The patient is not nervous/anxious.      Vision issues are normal aging changes.  Edema stable,  Minimal joint issues       OBJECTIVE:   BP (!) 142/94 (BP Location: Right arm, Patient Position: Sitting)   Pulse (!) 121   Temp 97.7  F (36.5  C) (Oral)   Resp 18   Ht 1.575 m (5' 2\")   Wt 113.5 kg (250 lb 3.2 oz)   LMP 07/03/2022   SpO2 98%   BMI 45.76 kg/m    Physical Exam    HEENT: extraocular movements are intact, pupils equal and reactive to light and accommodation, TMs clear  NECK: Neck supple. No adenopathy. Thyroid symmetric, normal size,, Carotids without bruits.  PULMONARY: clear to auscultation  CARDIAC: regular rate and rhythm and no murmurs, clicks, or gallops  PULSES: 2/2 throughout  BACK: no spinal or CVAT  ABDOMINAL: Soft, nontender.  Normal bowel sounds.  No " hepatosplenomegaly or abnormal masses  BREAST: No breast masses or tenderness, No axillary masses or tenderness and No galactorrhea  PELVIC: external genitalia normal, vaginal mucosa normal, cervix normal, specimen sent for pap,  REFLEXES: 2+ throughout  SKIN: She has a few faint purpuric lesions on legs      ASSESSMENT/PLAN:   (Z00.00) Encounter for routine adult health examination without abnormal findings  (primary encounter diagnosis)  Comment: Referred for colonoscopy, advised about shingles vaccine and she may consider  Plan:     (E66.01) Morbid obesity (H)  Comment: Has had some weight loss  Plan: Continue working on diet and exercise    (D69.0) HSP (Henoch Schonlein purpura) (H)  Comment: She has had a flare this winter but it is improving significantly.  She does not wish to consider prednisone at this time we will continue on hydroxyzine.  Plan: Call for any other flares or send a [employer] message and we can treat with prednisone again    (D68.61) Anti-cardiolipin antibody syndrome (H)  Comment: Stable without evidence of any clotting  Plan: Continue warfarin    (I10) Benign essential hypertension  Comment: Blood pressure readings high, recommend she do some outside checks  Plan: Basic metabolic panel  (Ca, Cl, CO2, Creat,         Gluc, K, Na, BUN), Albumin Random Urine         Quantitative with Creat Ratio, amLODIPine         (NORVASC) 5 MG tablet            (N92.4) Excessive bleeding in premenopausal period  Comment: She is not having spotting between, this is relatively recent so we will try a few months of progesterone.  If not helping, would consider ultrasound and OB/GYN referral  Plan: medroxyPROGESTERone (PROVERA) 10 MG tablet            (Z12.4) Cervical cancer screening  Comment:   Plan: Pap Screen with HPV - recommended age 30 - 65         years, HPV Hold (Lab Only), HPV High Risk Types        DNA Cervical            (Z79.01) Long term current use of anticoagulants with INR goal of  "2.0-3.0  Comment:   Plan:     (Z13.6) CARDIOVASCULAR SCREENING; LDL GOAL LESS THAN 130  Comment:   Plan: Lipid panel reflex to direct LDL Fasting            (Z12.11) Screen for colon cancer  Comment:   Plan: Colonscopy Screening  Referral            (K21.9) Gastroesophageal reflux disease without esophagitis  Comment: Stable, continue medication  Plan: omeprazole (PRILOSEC) 20 MG DR capsule              Patient has been advised of split billing requirements and indicates understanding: Yes    COUNSELING:  Reviewed preventive health counseling, as reflected in patient instructions    Estimated body mass index is 48.94 kg/m  as calculated from the following:    Height as of 11/16/20: 1.562 m (5' 1.5\").    Weight as of 11/16/20: 119.4 kg (263 lb 4.8 oz).    Weight management plan: Discussed healthy diet and exercise guidelines    She reports that she has never smoked. She has never used smokeless tobacco.      Counseling Resources:  ATP IV Guidelines  Pooled Cohorts Equation Calculator  Breast Cancer Risk Calculator  BRCA-Related Cancer Risk Assessment: FHS-7 Tool  FRAX Risk Assessment  ICSI Preventive Guidelines  Dietary Guidelines for Americans, 2010  [employer]'s MyPlate  ASA Prophylaxis  Lung CA Screening    Hortencia Marcus MD  [facility]  Answers for HPI/ROS submitted by the patient on 7/11/2022  If you checked off any problems, how difficult have these problems made it for you to do your work, take care of things at home, or get along with other people?: Not difficult at all  PHQ9 TOTAL SCORE: 3      "

## 2022-07-13 LAB
ANION GAP SERPL CALCULATED.3IONS-SCNC: 3 MMOL/L (ref 3–14)
BUN SERPL-MCNC: 12 MG/DL (ref 7–30)
CALCIUM SERPL-MCNC: 8.6 MG/DL (ref 8.5–10.1)
CHLORIDE BLD-SCNC: 108 MMOL/L (ref 94–109)
CHOLEST SERPL-MCNC: 179 MG/DL
CO2 SERPL-SCNC: 27 MMOL/L (ref 20–32)
CREAT SERPL-MCNC: 0.7 MG/DL (ref 0.52–1.04)
CREAT UR-MCNC: 343 MG/DL
FASTING STATUS PATIENT QL REPORTED: YES
GFR SERPL CREATININE-BSD FRML MDRD: >90 ML/MIN/1.73M2
GLUCOSE BLD-MCNC: 98 MG/DL (ref 70–99)
HDLC SERPL-MCNC: 61 MG/DL
LDLC SERPL CALC-MCNC: 96 MG/DL
MICROALBUMIN UR-MCNC: 792 MG/L
MICROALBUMIN/CREAT UR: 230.9 MG/G CR (ref 0–25)
NONHDLC SERPL-MCNC: 118 MG/DL
POTASSIUM BLD-SCNC: 3.9 MMOL/L (ref 3.4–5.3)
SODIUM SERPL-SCNC: 138 MMOL/L (ref 133–144)
TRIGL SERPL-MCNC: 108 MG/DL

## 2022-07-18 LAB
BKR LAB AP GYN ADEQUACY: NORMAL
BKR LAB AP GYN INTERPRETATION: NORMAL
BKR LAB AP HPV REFLEX: NORMAL
BKR LAB AP PREVIOUS ABNORMAL: NORMAL
PATH REPORT.COMMENTS IMP SPEC: NORMAL
PATH REPORT.COMMENTS IMP SPEC: NORMAL
PATH REPORT.RELEVANT HX SPEC: NORMAL

## 2022-07-19 LAB
HUMAN PAPILLOMA VIRUS 16 DNA: NEGATIVE
HUMAN PAPILLOMA VIRUS 18 DNA: NEGATIVE
HUMAN PAPILLOMA VIRUS FINAL DIAGNOSIS: NORMAL
HUMAN PAPILLOMA VIRUS OTHER HR: NEGATIVE

## 2022-07-23 RX ORDER — AMLODIPINE BESYLATE 5 MG/1
5 TABLET ORAL DAILY
Qty: 90 TABLET | Refills: 3 | Status: SHIPPED | OUTPATIENT
Start: 2022-07-23 | End: 2023-10-04

## 2022-07-26 ENCOUNTER — ANTICOAGULATION THERAPY VISIT (OUTPATIENT)
Dept: ANTICOAGULATION | Facility: CLINIC | Age: 52
End: 2022-07-26

## 2022-07-26 ENCOUNTER — ALLIED HEALTH/NURSE VISIT (OUTPATIENT)
Dept: FAMILY MEDICINE | Facility: CLINIC | Age: 52
End: 2022-07-26
Payer: COMMERCIAL

## 2022-07-26 ENCOUNTER — LAB (OUTPATIENT)
Dept: LAB | Facility: CLINIC | Age: 52
End: 2022-07-26
Payer: COMMERCIAL

## 2022-07-26 VITALS — SYSTOLIC BLOOD PRESSURE: 132 MMHG | DIASTOLIC BLOOD PRESSURE: 90 MMHG

## 2022-07-26 DIAGNOSIS — D68.61 ANTI-CARDIOLIPIN ANTIBODY SYNDROME (H): ICD-10-CM

## 2022-07-26 DIAGNOSIS — Z79.01 LONG TERM CURRENT USE OF ANTICOAGULANTS WITH INR GOAL OF 2.0-3.0: Primary | ICD-10-CM

## 2022-07-26 DIAGNOSIS — I10 BENIGN ESSENTIAL HYPERTENSION: Primary | ICD-10-CM

## 2022-07-26 DIAGNOSIS — Z79.01 LONG TERM CURRENT USE OF ANTICOAGULANTS WITH INR GOAL OF 2.0-3.0: ICD-10-CM

## 2022-07-26 LAB — INR BLD: 1.8 (ref 0.9–1.1)

## 2022-07-26 PROCEDURE — 85610 PROTHROMBIN TIME: CPT

## 2022-07-26 PROCEDURE — 36416 COLLJ CAPILLARY BLOOD SPEC: CPT

## 2022-07-26 PROCEDURE — 99207 PR NO CHARGE NURSE ONLY: CPT

## 2022-07-26 NOTE — PROGRESS NOTES
ANTICOAGULATION MANAGEMENT     Renetta Reid 51 year old female is on warfarin with subtherapeutic INR result. (Goal INR 2.0-3.0)    Recent labs: (last 7 days)     07/26/22  1535   INR 1.8*       ASSESSMENT       Source(s): Chart Review    Previous INR was Supratherapeutic    Medication, diet, health changes since last INR chart reviewed; none identified           PLAN     Unable to reach Renetta today.    Left message to take a booster dose of warfarin,  2.5 mg tonight. Request call back for assessment.    Follow up required to confirm warfarin dose taken and assess for changes and discuss out of range result     Shanda Lu RN  Anticoagulation Clinic  7/26/2022

## 2022-07-26 NOTE — PROGRESS NOTES
Renetta Reid is a 51 year old patient who comes in today for a Blood Pressure check per the request of Dr. Marcus. Per the patient the provider would like her to have her BP checked once a week for 3 weeks.    Initial BP:  BP (!) 132/90   LMP 07/03/2022      Data Unavailable  Disposition: follow-up as previously indicated by provider.    Rosa Beebe MA

## 2022-07-27 NOTE — PROGRESS NOTES
ANTICOAGULATION MANAGEMENT     Renetta Reid 51 year old female is on warfarin with subtherapeutic INR result. (Goal INR 2.0-3.0)    Recent labs: (last 7 days)     07/26/22  1535   INR 1.8*       ASSESSMENT       Source(s): Chart Review and Patient/Caregiver Call       Warfarin doses taken: Warfarin taken as instructed, patient denies any missed warfarin doses    Diet: No new diet changes identified    New illness, injury, or hospitalization: No    Medication/supplement changes: None noted    Signs or symptoms of bleeding or clotting: No    Previous INR: Supratherapeutic    Additional findings: None       PLAN     Recommended plan for no diet, medication or health factor changes affecting INR     Dosing Instructions: booster dose then continue your current warfarin dose with next INR in 2 weeks       Summary  As of 7/26/2022    Full warfarin instructions:  7/26: 2.5 mg; Otherwise 1.25 mg every Tue, Fri; 2.5 mg all other days   Next INR check:  8/9/2022             Telephone call with Renetta who verbalizes understanding and agrees to plan    Lab visit scheduled    Education provided: Please call back if any changes to your diet, medications or how you've been taking warfarin and Contact [phone]  with any changes, questions or concerns.     Plan made per [facility] anticoagulation protocol    Elaine Lu RN  Anticoagulation Clinic  7/27/2022    _______________________________________________________________________     Anticoagulation Episode Summary     Current INR goal:  2.0-3.0   TTR:  80.9 % (9.3 mo)   Target end date:  Indefinite   Send INR reminders to:  [facility]    Indications    Long term current use of anticoagulants with INR goal of 2.0-3.0 [Z79.01]  Anti-cardiolipin antibody syndrome (H) [D68.61]           Comments:           Anticoagulation Care Providers     Provider Role Specialty Phone number    Hortencia Marcus MD Referring Internal Medicine [phone]

## 2022-07-28 DIAGNOSIS — Z79.01 LONG TERM CURRENT USE OF ANTICOAGULANTS WITH INR GOAL OF 2.0-3.0: ICD-10-CM

## 2022-07-28 DIAGNOSIS — D68.9 COAGULOPATHY (H): ICD-10-CM

## 2022-07-29 RX ORDER — WARFARIN SODIUM 2.5 MG/1
TABLET ORAL
Qty: 90 TABLET | Refills: 0 | Status: SHIPPED | OUTPATIENT
Start: 2022-07-29 | End: 2023-02-07

## 2022-08-01 ENCOUNTER — NURSE TRIAGE (OUTPATIENT)
Dept: NURSING | Facility: CLINIC | Age: 52
End: 2022-08-01

## 2022-08-01 NOTE — TELEPHONE ENCOUNTER
S: looking for pain medication recommendations for broken tailbone    B: Fell on Friday on yudithe. Is currently on blood thinners and unable to take NSAIDS. Wanting to know what pain meds she can take.    A: Able to walk, denies numbness in both legs and feet, no issues with bowel movements for urination. No broken skin. Pain when sitting and walking. Has tried tylenol and states ice packs are helping with the discomfort.    R: See PCP within 3 days. Pt transferred to scheduling to see about a virtual or phone visit.     ELAINA FOSS RN    Reason for Disposition    [1] After 3 days (72 hours) AND [2] pain not improving    Additional Information    Negative: Dangerous mechanism of injury (e.g., fall > 10 feet or 3 meters, trampoline)(Exception: pain began > 1 hour after injury)    Negative: Sounds like a life-threatening emergency to the triager    Negative: Injury to back above tailbone    Negative: Wound looks infected    Negative: Can't walk or very difficult to walk    Negative: [1] Unable to urinate (or only a few drops) > 4 hours AND     [2] bladder feels very full (e.g., palpable bladder or strong urge to urinate)    Negative: [1] Loss of bladder or bowel control (urine or bowel incontinence; wetting self, leaking stool) AND [2] new onset    Negative: Numbness (loss of sensation) in groin or rectal area    Negative: Blood in stool    Negative: Blood in urine (red, pink, or tea-colored)    Negative: Weakness of a leg or foot (e.g., unable to bear weight, dragging foot)    Negative: Numbness in a leg or foot (i.e., loss of sensation)    Negative: [1] SEVERE pain AND [2] not improved 2 hours after pain medicine/ice packs    Negative: Pain radiates into the thigh or further down the leg now    Negative: [1] High-risk adult (e.g., age > 60, osteoporosis, chronic steroid use) AND [2] still hurts    Protocols used: TAILBONE INJURY-A-

## 2022-08-02 ENCOUNTER — VIRTUAL VISIT (OUTPATIENT)
Dept: INTERNAL MEDICINE | Facility: CLINIC | Age: 52
End: 2022-08-02
Payer: COMMERCIAL

## 2022-08-02 DIAGNOSIS — M53.3 PAIN IN THE COCCYX: Primary | ICD-10-CM

## 2022-08-02 PROCEDURE — 99213 OFFICE O/P EST LOW 20 MIN: CPT | Mod: 95 | Performed by: INTERNAL MEDICINE

## 2022-08-02 RX ORDER — LIDOCAINE 4 G/G
1 PATCH TOPICAL EVERY 24 HOURS
Qty: 10 PATCH | Refills: 0 | Status: SHIPPED | OUTPATIENT
Start: 2022-08-02 | End: 2024-04-25

## 2022-08-02 NOTE — PROGRESS NOTES
Renetta is a 51 year old who is being evaluated via a billable video visit.      How would you like to obtain your AVS? MyChart  If the video visit is dropped, the invitation should be resent by: Send to e-mail at: [email or web address]  Will anyone else be joining your video visit? No          Assessment & Plan      (M53.3) Pain in the coccyx  Plan: Patient was advised to use donut seat, started on diclofenac (VOLTAREN) 1 % topical gel, and Lidocaine (LIDOCARE) 4 % Patch as directed.explained clearly about the medication,insructions and side effects.   check , XR Sacrum and  Coccyx 2 Views.pt was told I will contact her after results and proceed accordingly.  Advised to follow-up with her PCP in clinic if symptoms persist or worsens          Ordering of each unique test  Prescription drug management        Return in about 2 weeks (around 8/16/2022) for With PCP.    Herman Burciaga MD  [facility]   Renetta is a 51 year old presenting for the following health issues:  Fall      Fall    History of Present Illness       Reason for visit:  Injury to tailbone  Symptom onset:  1-3 days ago  Symptoms include:  Pain in tailbone that affects lower body movement  Symptom intensity:  Moderate  Symptom progression:  Staying the same  Had these symptoms before:  No  What makes it worse:  Moving    She eats 0-1 servings of fruits and vegetables daily.She consumes 1 sweetened beverage(s) daily.She exercises with enough effort to increase her heart rate 9 or less minutes per day.  She exercises with enough effort to increase her heart rate 3 or less days per week.   She is taking medications regularly.     Pt is a 51 year old female who presents for virtual visit today with c/o pain in her tailbone since she fell about 5 days ago, patient states she was out watering her flowers and slipped on water and fell on her back.  No other injuries, no radiation of pain to bilateral lower extremity,  no tingling or numbness or weakness in bilateral lower extremities, no bladder or bowel incontinence.  Patient has been taking over-the-counter Tylenol without much relief.  Has not been using donut seat  Patient says she cannot take NSAIDs as she is on warfarin      Past Medical History:   Diagnosis Date     Calculus of kidney 97.2000 &2003    Stones occurred with pregnancies.     Coagulopathy (H) 8/10    Lupus inhibitor, antcardiolipin antibody     Diverticulitis of colon (without mention of hemorrhage)(562.11) 10/00     DVT (deep venous thrombosis) (H) 8/10    right leg     Esophageal reflux      Family history of coronary artery disease     Brother 40     HSP (Henoch Schonlein purpura) (H) 11/10    with Vasculitis     Normal delivery 1997, 2000     Pulmonary emboli (H) 8/10     Rosacea      Urticaria, unspecified     episodic       Current Outpatient Medications   Medication Sig Dispense Refill     amLODIPine (NORVASC) 5 MG tablet Take 1 tablet (5 mg) by mouth daily 90 tablet 3     diclofenac (VOLTAREN) 1 % topical gel Apply 2 g topically 3 times daily as needed for moderate pain 50 g 0     hydrOXYzine (ATARAX) 10 MG tablet Take 2 tablets (20 mg) by mouth At Bedtime 180 tablet 3     Lidocaine (LIDOCARE) 4 % Patch Place 1 patch onto the skin every 24 hours To prevent lidocaine toxicity, patient should be patch free for 12 hrs daily. 10 patch 0     medroxyPROGESTERone (PROVERA) 10 MG tablet 10 mg daily starting day 14 of cycle for 10 days 30 tablet 0     omeprazole (PRILOSEC) 20 MG DR capsule Take 1 capsule (20 mg) by mouth daily ### DO NOT FILL NOW.  Please update patient's profile to reflect additional refills.  #### 90 capsule 3     warfarin ANTICOAGULANT (COUMADIN) 2.5 MG tablet TAKE 1/2 TABLET (1.25 MG) BY MOUTH ON TUES AND FRIDAY, AND 1 TABLET(2.5 MG)  ALL OTHER DAYS OR AS DIRECTED BY INR CLINIC 90 tablet 0          Review of Systems   CONSTITUTIONAL: NEGATIVE for fever, chills, change in weight  RESP:  NEGATIVE for significant cough or SOB  MUSCULOSKELETAL: Pain in tailbone  NEURO: NEGATIVE for weakness, dizziness or paresthesias      Objective           Vitals:  No vitals were obtained today due to virtual visit.    Physical Exam   GENERAL: Healthy, alert and no distress  RESP: No audible wheeze, cough, or visible cyanosis.  No visible retractions or increased work of breathing.              Video-Visit Details    Video Start Time: 11:13  AM    Type of service:  Video Visit    Video End Time:11:20 AM    Originating Location (pt. Location): Home    Distant Location (provider location):  [facility]     Platform used for Video Visit: appAttach    .  ..

## 2022-08-09 ENCOUNTER — ANTICOAGULATION THERAPY VISIT (OUTPATIENT)
Dept: ANTICOAGULATION | Facility: CLINIC | Age: 52
End: 2022-08-09

## 2022-08-09 ENCOUNTER — LAB (OUTPATIENT)
Dept: LAB | Facility: CLINIC | Age: 52
End: 2022-08-09
Payer: COMMERCIAL

## 2022-08-09 DIAGNOSIS — Z79.01 LONG TERM CURRENT USE OF ANTICOAGULANTS WITH INR GOAL OF 2.0-3.0: Primary | ICD-10-CM

## 2022-08-09 DIAGNOSIS — D68.61 ANTI-CARDIOLIPIN ANTIBODY SYNDROME (H): ICD-10-CM

## 2022-08-09 DIAGNOSIS — Z79.01 LONG TERM CURRENT USE OF ANTICOAGULANTS WITH INR GOAL OF 2.0-3.0: ICD-10-CM

## 2022-08-09 LAB — INR BLD: 3.6 (ref 0.9–1.1)

## 2022-08-09 PROCEDURE — 85610 PROTHROMBIN TIME: CPT

## 2022-08-09 PROCEDURE — 36416 COLLJ CAPILLARY BLOOD SPEC: CPT

## 2022-08-09 NOTE — PROGRESS NOTES
ANTICOAGULATION MANAGEMENT     Renetta Reid 51 year old female is on warfarin with supratherapeutic INR result. (Goal INR 2.0-3.0)    Recent labs: (last 7 days)     08/09/22  1540   INR 3.6*       ASSESSMENT       Source(s): Chart Review and Patient/Caregiver Call       Warfarin doses taken: Warfarin taken as instructed    Diet: No new diet changes identified    New illness, injury, or hospitalization: Yes: Patient reports she fell 7/29/22, fell on tailbone, patient reports pain has decreased, painful to walk up stairs, bending over, walking in general but this is also improving    Medication/supplement changes: Patient reports she did take more Tylenol 7/30, 7/31 and 8/1/22    Signs or symptoms of bleeding or clotting: No    Previous INR: Subtherapeutic    Additional findings: None       PLAN     Recommended plan for no diet, medication or health factor changes affecting INR     Dosing Instructions: hold dose then continue your current warfarin dose with next INR in 2 weeks       Summary  As of 8/9/2022    Full warfarin instructions:  8/9: Hold; Otherwise 1.25 mg every Tue, Fri; 2.5 mg all other days   Next INR check:  8/23/2022             Telephone call with Renetta who agrees to plan and repeated back plan correctly and ; made aware of patient survey and encouraged to participate.    Lab visit scheduled    Education provided: Please call back if any changes to your diet, medications or how you've been taking warfarin, Monitoring for bleeding signs and symptoms and Contact [phone]  with any changes, questions or concerns.     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

## 2022-08-10 ENCOUNTER — ALLIED HEALTH/NURSE VISIT (OUTPATIENT)
Dept: FAMILY MEDICINE | Facility: CLINIC | Age: 52
End: 2022-08-10
Payer: COMMERCIAL

## 2022-08-10 VITALS — DIASTOLIC BLOOD PRESSURE: 88 MMHG | SYSTOLIC BLOOD PRESSURE: 126 MMHG

## 2022-08-10 DIAGNOSIS — I10 BENIGN ESSENTIAL HYPERTENSION: Primary | ICD-10-CM

## 2022-08-10 PROCEDURE — 99207 PR NO CHARGE NURSE ONLY: CPT

## 2022-08-10 NOTE — PROGRESS NOTES
Renetta Reid is a 51 year old patient who comes in today for a Blood Pressure check.  Initial BP:  /88   LMP 07/31/2022 (Exact Date)      Disposition: follow-up as previously indicated by provider  Luba Laguna, [facility]

## 2022-08-19 NOTE — TELEPHONE ENCOUNTER
[facility]                  Colonoscopy Operative Report    8/19/2022      Chitra Cottrell  [id number]  1951    Procedure Type:   Colonoscopy --diagnostic     Indications:    Diarrhea, Family history of coloretal cancer (screening only), Personal history of colon polyps (screening only)     Pre-operative Diagnosis: see indication above    Post-operative Diagnosis:  See findings below    :  Tania Rachel MD    Referring Provider: Frankie Dangelo MD      Sedation:  MAC anesthesia Propofol    Pre-Procedural Exam:      Airway: clear,  No airway problems anticipated  Heart: RRR, without gallops or rubs  Lungs: clear bilaterally without wheezes, crackles, or rhonchi  Abdomen: soft, nontender, nondistended, bowel sounds present  Mental Status: awake, alert and oriented to person, place and time     Procedure Details:  After informed consent was obtained with all risks and benefits of procedure explained and preoperative exam completed, the patient was taken to the endoscopy suite and placed in the left lateral decubitus position. Upon sequential sedation as per above, a digital rectal exam was performed . The Olympus videocolonoscope  was inserted in the rectum and carefully advanced to the cecum, which was identified by the ileocecal valve and appendiceal orifice. The cecum was identified by the ileocecal valve and appendiceal orifice. The quality of preparation was good. The colonoscope was slowly withdrawn with careful evaluation between folds. Retroflexion in the rectum was completed demonstrating internal hemorrhoids. Findings:   Rectum: Grade 1 internal hemorrhoid(s); Sigmoid:     - Diverticulosis  Descending Colon: normal, biopsied  Transverse Colon: normal  Ascending Colon:     - Diverticulosis, normal mucosa, biopsied  Cecum: normal  Terminal Ileum: not intubated      Specimen Removed:   1.  Biopsies of ascending colon  2 biopsies of descending Warfarin dosing is managed by INR Clinic.  Prescription approved per [facility] Refill Protocol.  Jennifer Matias RN     colon    Complications: None. EBL:  None. Impression:    normal colonic mucosa throughout  diverticulosis,  Mild in degree, involving the ascending colon and the descending colon  hemorrhoids internal, Moderate in size    Recommendations: --Await pathology. , -Repeat colonoscopy in 5 years. High fiber diet. Resume normal medication(s). Discharge Disposition:  Home in the company of a  when able to ambulate. Jose Block MD    8/19/2022     RICHIE Villarreal MD  [facility], [address] [place] [address]  [phone]  [email or web address]

## 2022-08-23 ENCOUNTER — LAB (OUTPATIENT)
Dept: LAB | Facility: CLINIC | Age: 52
End: 2022-08-23
Payer: COMMERCIAL

## 2022-08-23 ENCOUNTER — ANTICOAGULATION THERAPY VISIT (OUTPATIENT)
Dept: ANTICOAGULATION | Facility: CLINIC | Age: 52
End: 2022-08-23

## 2022-08-23 ENCOUNTER — ALLIED HEALTH/NURSE VISIT (OUTPATIENT)
Dept: FAMILY MEDICINE | Facility: CLINIC | Age: 52
End: 2022-08-23

## 2022-08-23 VITALS — SYSTOLIC BLOOD PRESSURE: 138 MMHG | DIASTOLIC BLOOD PRESSURE: 80 MMHG

## 2022-08-23 DIAGNOSIS — Z79.01 LONG TERM CURRENT USE OF ANTICOAGULANTS WITH INR GOAL OF 2.0-3.0: ICD-10-CM

## 2022-08-23 DIAGNOSIS — I10 BENIGN ESSENTIAL HYPERTENSION: Primary | ICD-10-CM

## 2022-08-23 DIAGNOSIS — D68.61 ANTI-CARDIOLIPIN ANTIBODY SYNDROME (H): ICD-10-CM

## 2022-08-23 DIAGNOSIS — Z79.01 LONG TERM CURRENT USE OF ANTICOAGULANTS WITH INR GOAL OF 2.0-3.0: Primary | ICD-10-CM

## 2022-08-23 LAB — INR BLD: 2.7 (ref 0.9–1.1)

## 2022-08-23 PROCEDURE — 36416 COLLJ CAPILLARY BLOOD SPEC: CPT

## 2022-08-23 PROCEDURE — 99207 PR NO CHARGE NURSE ONLY: CPT

## 2022-08-23 PROCEDURE — 85610 PROTHROMBIN TIME: CPT

## 2022-08-23 NOTE — PROGRESS NOTES
ANTICOAGULATION MANAGEMENT     Renetta Reid 51 year old female is on warfarin with therapeutic INR result. (Goal INR 2.0-3.0)    Recent labs: (last 7 days)     08/23/22  1546   INR 2.7*       ASSESSMENT       Source(s): Chart Review and Patient/Caregiver Call       Warfarin doses taken: Warfarin taken as instructed    Diet: No new diet changes identified    New illness, injury, or hospitalization: No, patient states the pain in her tailbone is better.    Medication/supplement changes: None noted    Signs or symptoms of bleeding or clotting: No    Previous INR: Supratherapeutic    Additional findings: None       PLAN     Recommended plan for no diet, medication or health factor changes affecting INR     Dosing Instructions: Continue your current warfarin dose with next INR in 3 weeks       Summary  As of 8/23/2022    Full warfarin instructions:  1.25 mg every Tue, Fri; 2.5 mg all other days   Next INR check:  9/13/2022             Telephone call with Renetta who verbalizes understanding and agrees to plan    Lab visit scheduled    Education provided: None required    Plan made per [facility] anticoagulation protocol    Maryann Gutierrez RN  Anticoagulation Clinic  8/23/2022    _______________________________________________________________________     Anticoagulation Episode Summary     Current INR goal:  2.0-3.0   TTR:  77.6 % (10.2 mo)   Target end date:  Indefinite   Send INR reminders to:  [facility]    Indications    Long term current use of anticoagulants with INR goal of 2.0-3.0 [Z79.01]  Anti-cardiolipin antibody syndrome (H) [D68.61]           Comments:           Anticoagulation Care Providers     Provider Role Specialty Phone number    Hortencia Marcus MD Referring Internal Medicine [phone]

## 2022-08-23 NOTE — PROGRESS NOTES
Renetta Reid is a 51 year old patient who comes in today for a Blood Pressure check.  Initial BP:  /80   LMP 07/31/2022 (Exact Date)      Data Unavailable  Disposition: follow-up as previously indicated by provider    Cary Briones CMA

## 2022-09-13 ENCOUNTER — LAB (OUTPATIENT)
Dept: LAB | Facility: CLINIC | Age: 52
End: 2022-09-13
Payer: COMMERCIAL

## 2022-09-13 ENCOUNTER — DOCUMENTATION ONLY (OUTPATIENT)
Dept: ANTICOAGULATION | Facility: CLINIC | Age: 52
End: 2022-09-13

## 2022-09-13 ENCOUNTER — ANTICOAGULATION THERAPY VISIT (OUTPATIENT)
Dept: ANTICOAGULATION | Facility: CLINIC | Age: 52
End: 2022-09-13

## 2022-09-13 DIAGNOSIS — Z79.01 LONG TERM CURRENT USE OF ANTICOAGULANTS WITH INR GOAL OF 2.0-3.0: ICD-10-CM

## 2022-09-13 DIAGNOSIS — Z79.01 LONG TERM CURRENT USE OF ANTICOAGULANTS WITH INR GOAL OF 2.0-3.0: Primary | ICD-10-CM

## 2022-09-13 DIAGNOSIS — D68.61 ANTI-CARDIOLIPIN ANTIBODY SYNDROME (H): Primary | ICD-10-CM

## 2022-09-13 DIAGNOSIS — D68.61 ANTI-CARDIOLIPIN ANTIBODY SYNDROME (H): ICD-10-CM

## 2022-09-13 LAB — INR BLD: 2.8 (ref 0.9–1.1)

## 2022-09-13 PROCEDURE — 36416 COLLJ CAPILLARY BLOOD SPEC: CPT

## 2022-09-13 PROCEDURE — 85610 PROTHROMBIN TIME: CPT

## 2022-09-13 NOTE — PROGRESS NOTES
ANTICOAGULATION CLINIC REFERRAL RENEWAL REQUEST       An annual renewal order is required for all patients referred to [facility].?  Please review and sign the pended referral order for Renetta Reid.       ANTICOAGULATION SUMMARY      Warfarin indication(s)   anti-cardiolipin antibody syndrome    Mechanical heart valve present?  NO       Current goal range   INR: 2.0-3.0     Goal appropriate for indication? Goal INR 2-3, standard for indication(s) above     Time in Therapeutic Range (TTR)  (Goal > 60%) 79%       Office visit with referring provider's group within last year yes on 07/12/2022       Maryann Gutierrez, RN  [facility]

## 2022-09-13 NOTE — PROGRESS NOTES
ANTICOAGULATION MANAGEMENT     Renetta Reid 51 year old female is on warfarin with therapeutic INR result. (Goal INR 2.0-3.0)    Recent labs: (last 7 days)     09/13/22  1532   INR 2.8*       ASSESSMENT       Source(s): Chart Review    Previous INR was Therapeutic last visit; previously outside of goal range    Medication, diet, health changes since last INR chart reviewed; none identified           PLAN     Unable to reach Renetta today.    Left detailed message to inform patient to continue same weekly Warfarin dose and to call INR clinic with any questions, concerns or changes.  Also to schedule next INR in 4 weeks       Maryann Gutierrez RN  Anticoagulation Clinic  9/13/2022

## 2022-10-07 DIAGNOSIS — N92.4 EXCESSIVE BLEEDING IN PREMENOPAUSAL PERIOD: ICD-10-CM

## 2022-10-07 NOTE — TELEPHONE ENCOUNTER
Routing refill request to provider for review/approval because:  Drug not on the [facility] refill protocol   Esau Potter RN, BSN

## 2022-10-07 NOTE — TELEPHONE ENCOUNTER
This is not intended to be used long-term.  She had used it for 3 months.  Call and find out, if she still having heavy bleeding it is time to do an ultrasound and have her see OB/GYN.

## 2022-10-12 NOTE — TELEPHONE ENCOUNTER
Patient was given this medication but she never started taking. Patient will call and schedule with OB GYN

## 2022-10-13 RX ORDER — MEDROXYPROGESTERONE ACETATE 10 MG
TABLET ORAL
Qty: 30 TABLET | Refills: 0 | OUTPATIENT
Start: 2022-10-13

## 2022-10-18 ENCOUNTER — DOCUMENTATION ONLY (OUTPATIENT)
Dept: ANTICOAGULATION | Facility: CLINIC | Age: 52
End: 2022-10-18

## 2022-10-18 NOTE — PROGRESS NOTES
ANTICOAGULATION     Renetta Reid is overdue for INR check.      Left message for patient to call and schedule lab appointment as soon as possible. If returning call, please schedule.     Hortencia Nvoak RN

## 2022-10-20 NOTE — TELEPHONE ENCOUNTER
Left message that ultra sound was ordered , they will call her to set it up , to call if any concerns or questions ANTONIETTA Farrar LPN

## 2022-10-25 ENCOUNTER — TELEPHONE (OUTPATIENT)
Dept: ANTICOAGULATION | Facility: CLINIC | Age: 52
End: 2022-10-25

## 2022-11-01 ENCOUNTER — TELEPHONE (OUTPATIENT)
Dept: ANTICOAGULATION | Facility: CLINIC | Age: 52
End: 2022-11-01

## 2022-11-01 NOTE — LETTER
[facility]  [address] [facility] [address]  Phone: [phone]  Fax: [phone]   November 1, 2022        Renetta Reid  [address]  [place] [address]            Dear Renetta,    You are currently under the care of [facility] for your warfarin (Coumadin , Jantoven ) therapy.  We are contacting you because our records show you were due for an INR on 10/11/22.    There are potentially serious risks when taking warfarin without careful monitoring and we want to make sure you are safely managed.  Routine lab monitoring is required for warfarin refills.     Please call [phone]  as soon as possible to schedule an appointment.  If there has been a change in your care or other concerns, please let us know so we can help and or update our records.     Sincerely,       [facility]

## 2022-11-01 NOTE — TELEPHONE ENCOUNTER
ANTICOAGULATION     Renetta Reid is overdue for INR check.      Reminder letter sent    Jennifer Matias RN

## 2022-11-07 ENCOUNTER — ANTICOAGULATION THERAPY VISIT (OUTPATIENT)
Dept: ANTICOAGULATION | Facility: CLINIC | Age: 52
End: 2022-11-07

## 2022-11-07 ENCOUNTER — LAB (OUTPATIENT)
Dept: LAB | Facility: CLINIC | Age: 52
End: 2022-11-07
Payer: COMMERCIAL

## 2022-11-07 DIAGNOSIS — N92.4 EXCESSIVE BLEEDING IN PREMENOPAUSAL PERIOD: ICD-10-CM

## 2022-11-07 DIAGNOSIS — D68.61 ANTI-CARDIOLIPIN ANTIBODY SYNDROME (H): ICD-10-CM

## 2022-11-07 DIAGNOSIS — Z79.01 LONG TERM CURRENT USE OF ANTICOAGULANTS WITH INR GOAL OF 2.0-3.0: ICD-10-CM

## 2022-11-07 DIAGNOSIS — Z79.01 LONG TERM CURRENT USE OF ANTICOAGULANTS WITH INR GOAL OF 2.0-3.0: Primary | ICD-10-CM

## 2022-11-07 LAB — INR BLD: 2.1 (ref 0.9–1.1)

## 2022-11-07 PROCEDURE — 36416 COLLJ CAPILLARY BLOOD SPEC: CPT

## 2022-11-07 PROCEDURE — 85610 PROTHROMBIN TIME: CPT

## 2022-11-07 NOTE — PROGRESS NOTES
ANTICOAGULATION MANAGEMENT     Renetta Reid 52 year old female is on warfarin with therapeutic INR result. (Goal INR 2.0-3.0)    Recent labs: (last 7 days)     11/07/22  1530   INR 2.1*       ASSESSMENT       Source(s): Chart Review    Previous INR was Therapeutic last 2(+) visits    Medication, diet, health changes since last INR chart reviewed; none identified           PLAN     Unable to reach Renetta today.    Left detailed message to inform patient to continue same weekly Warfarin dose and to call INR clinic with any questions, concerns or changes.  Also to schedule next INR in 5 weeks        Maryann Gutierrez RN  Anticoagulation Clinic  11/7/2022

## 2022-11-09 NOTE — TELEPHONE ENCOUNTER
Pending Prescriptions:                       Disp   Refills    medroxyPROGESTERone (PROVERA) 10 MG tablet*30 tab*0        Sig: TAKE 1 TABLET BY MOUTH ONCE DAILY. STARTING DAY 14 OF           CYCLE FOR 10 DAYS    Routing refill request to provider for review/approval because:  Drug not on the [facility] refill protocol

## 2022-11-10 RX ORDER — MEDROXYPROGESTERONE ACETATE 10 MG
TABLET ORAL
Qty: 30 TABLET | Refills: 0 | OUTPATIENT
Start: 2022-11-10

## 2022-11-10 NOTE — TELEPHONE ENCOUNTER
This was discontinued.  She is going to be having ultrasound and should see gynecology, see refill note from 10/7.

## 2022-12-19 ENCOUNTER — LAB (OUTPATIENT)
Dept: LAB | Facility: CLINIC | Age: 52
End: 2022-12-19
Payer: COMMERCIAL

## 2022-12-19 ENCOUNTER — ANTICOAGULATION THERAPY VISIT (OUTPATIENT)
Dept: ANTICOAGULATION | Facility: CLINIC | Age: 52
End: 2022-12-19

## 2022-12-19 ENCOUNTER — TELEPHONE (OUTPATIENT)
Dept: ANTICOAGULATION | Facility: CLINIC | Age: 52
End: 2022-12-19

## 2022-12-19 DIAGNOSIS — D68.61 ANTI-CARDIOLIPIN ANTIBODY SYNDROME (H): ICD-10-CM

## 2022-12-19 DIAGNOSIS — Z79.01 LONG TERM CURRENT USE OF ANTICOAGULANTS WITH INR GOAL OF 2.0-3.0: ICD-10-CM

## 2022-12-19 DIAGNOSIS — Z79.01 LONG TERM CURRENT USE OF ANTICOAGULANTS WITH INR GOAL OF 2.0-3.0: Primary | ICD-10-CM

## 2022-12-19 LAB — INR BLD: 1.6 (ref 0.9–1.1)

## 2022-12-19 PROCEDURE — 85610 PROTHROMBIN TIME: CPT

## 2022-12-19 PROCEDURE — 36416 COLLJ CAPILLARY BLOOD SPEC: CPT

## 2022-12-19 NOTE — TELEPHONE ENCOUNTER
ANTICOAGULATION     Renetta Reid is overdue for INR check.      Spoke with Renetta and scheduled lab appointment on today, 12/19/22    Maryann Gutierrez RN

## 2022-12-19 NOTE — PROGRESS NOTES
ANTICOAGULATION MANAGEMENT     Renetta Reid 52 year old female is on warfarin with subtherapeutic INR result. (Goal INR 2.0-3.0)    Recent labs: (last 7 days)     12/19/22  1517   INR 1.6*       ASSESSMENT       Source(s): Chart Review and Patient/Caregiver Call       Warfarin doses taken: Less warfarin taken than planned which may be affecting INR - missed dose 12/15    Diet: Increased greens/vitamin K in diet; plans to resume previous intake - had a large salad over the weekend.     New illness, injury, or hospitalization: No    Medication/supplement changes: None noted    Signs or symptoms of bleeding or clotting: No    Previous INR: Therapeutic last 2(+) visits    Additional findings: None       PLAN     Recommended plan for temporary change(s) affecting INR     Dosing Instructions: booster dose then continue your current warfarin dose with next INR in 10 days    Summary  As of 12/19/2022    Full warfarin instructions:  12/19: 5 mg; Otherwise 1.25 mg every Tue, Fri; 2.5 mg all other days; Starting 12/19/2022   Next INR check:  12/29/2022             Telephone call with Renetta who verbalizes understanding and agrees to plan    Lab visit scheduled    Education provided:     Please call back if any changes to your diet, medications or how you've been taking warfarin    Dietary considerations: importance of consistent vitamin K intake and impact of vitamin K foods on INR    Plan made per [facility] anticoagulation protocol    Shanda Lu RN  Anticoagulation Clinic  12/19/2022    _______________________________________________________________________     Anticoagulation Episode Summary     Current INR goal:  2.0-3.0   TTR:  79.1 % (1 y)   Target end date:  Indefinite   Send INR reminders to:  [facility]    Indications    Long term current use of anticoagulants with INR goal of 2.0-3.0 [Z79.01]  Anti-cardiolipin antibody syndrome (H) [D68.61]           Comments:           Anticoagulation Care  Providers     Provider Role Specialty Phone number    Hortencia Marcus MD Referring Internal Medicine [phone]

## 2023-01-05 ENCOUNTER — LAB (OUTPATIENT)
Dept: LAB | Facility: CLINIC | Age: 53
End: 2023-01-05
Payer: COMMERCIAL

## 2023-01-05 ENCOUNTER — TELEPHONE (OUTPATIENT)
Dept: ANTICOAGULATION | Facility: CLINIC | Age: 53
End: 2023-01-05

## 2023-01-05 ENCOUNTER — ANTICOAGULATION THERAPY VISIT (OUTPATIENT)
Dept: ANTICOAGULATION | Facility: CLINIC | Age: 53
End: 2023-01-05

## 2023-01-05 DIAGNOSIS — D68.61 ANTI-CARDIOLIPIN ANTIBODY SYNDROME (H): ICD-10-CM

## 2023-01-05 DIAGNOSIS — D68.9 COAGULOPATHY (H): ICD-10-CM

## 2023-01-05 DIAGNOSIS — Z79.01 LONG TERM CURRENT USE OF ANTICOAGULANTS WITH INR GOAL OF 2.0-3.0: ICD-10-CM

## 2023-01-05 DIAGNOSIS — Z79.01 LONG TERM CURRENT USE OF ANTICOAGULANTS WITH INR GOAL OF 2.0-3.0: Primary | ICD-10-CM

## 2023-01-05 LAB — INR BLD: 2.4 (ref 0.9–1.1)

## 2023-01-05 PROCEDURE — 85610 PROTHROMBIN TIME: CPT

## 2023-01-05 PROCEDURE — 36416 COLLJ CAPILLARY BLOOD SPEC: CPT

## 2023-01-05 NOTE — TELEPHONE ENCOUNTER
ANTICOAGULATION     Renetta Reid is overdue for INR check.      Spoke with Renetta and scheduled lab appointment on today, 1/5/23    Maryann Gutierrez RN

## 2023-01-05 NOTE — PROGRESS NOTES
ANTICOAGULATION MANAGEMENT     Renetta Reid 52 year old female is on warfarin with therapeutic INR result. (Goal INR 2.0-3.0)    Recent labs: (last 7 days)     01/05/23  1530   INR 2.4*       ASSESSMENT       Source(s): Chart Review and Patient/Caregiver Call       Warfarin doses taken: Warfarin taken as instructed    Diet: No new diet changes identified    New illness, injury, or hospitalization: No    Medication/supplement changes: None noted    Signs or symptoms of bleeding or clotting: No    Previous INR: Subtherapeutic    Additional findings: None       PLAN     Recommended plan for no diet, medication or health factor changes affecting INR     Dosing Instructions: Continue your current warfarin dose with next INR in 3-4 weeks, patient elected for 4 weeks.      Summary  As of 1/5/2023    Full warfarin instructions:  1.25 mg every Tue, Fri; 2.5 mg all other days   Next INR check:  2/2/2023             Telephone call with Renetta who verbalizes understanding and agrees to plan    Lab visit scheduled    Education provided:     Contact [phone]  with any changes, questions or concerns.     Plan made per [facility] anticoagulation protocol    Maryann Gutierrez, RN  Anticoagulation Clinic  1/5/2023    _______________________________________________________________________     Anticoagulation Episode Summary     Current INR goal:  2.0-3.0   TTR:  76.8 % (1 y)   Target end date:  Indefinite   Send INR reminders to:  [facility]    Indications    Long term current use of anticoagulants with INR goal of 2.0-3.0 [Z79.01]  Anti-cardiolipin antibody syndrome (H) [D68.61]           Comments:           Anticoagulation Care Providers     Provider Role Specialty Phone number    Hortencia Marcus MD Referring Internal Medicine [phone]

## 2023-02-06 DIAGNOSIS — Z79.01 LONG TERM CURRENT USE OF ANTICOAGULANTS WITH INR GOAL OF 2.0-3.0: ICD-10-CM

## 2023-02-06 DIAGNOSIS — D68.9 COAGULOPATHY (H): ICD-10-CM

## 2023-02-07 RX ORDER — WARFARIN SODIUM 2.5 MG/1
TABLET ORAL
Qty: 30 TABLET | Refills: 0 | Status: SHIPPED | OUTPATIENT
Start: 2023-02-07 | End: 2023-03-15

## 2023-02-16 ENCOUNTER — TELEPHONE (OUTPATIENT)
Dept: ANTICOAGULATION | Facility: CLINIC | Age: 53
End: 2023-02-16
Payer: COMMERCIAL

## 2023-02-20 ENCOUNTER — ANTICOAGULATION THERAPY VISIT (OUTPATIENT)
Dept: ANTICOAGULATION | Facility: CLINIC | Age: 53
End: 2023-02-20

## 2023-02-20 ENCOUNTER — LAB (OUTPATIENT)
Dept: LAB | Facility: CLINIC | Age: 53
End: 2023-02-20
Payer: COMMERCIAL

## 2023-02-20 DIAGNOSIS — Z79.01 LONG TERM CURRENT USE OF ANTICOAGULANTS WITH INR GOAL OF 2.0-3.0: Primary | ICD-10-CM

## 2023-02-20 DIAGNOSIS — D68.61 ANTI-CARDIOLIPIN ANTIBODY SYNDROME (H): ICD-10-CM

## 2023-02-20 DIAGNOSIS — Z79.01 LONG TERM CURRENT USE OF ANTICOAGULANTS WITH INR GOAL OF 2.0-3.0: ICD-10-CM

## 2023-02-20 LAB — INR BLD: 2.3 (ref 0.9–1.1)

## 2023-02-20 PROCEDURE — 36416 COLLJ CAPILLARY BLOOD SPEC: CPT

## 2023-02-20 PROCEDURE — 85610 PROTHROMBIN TIME: CPT

## 2023-02-20 NOTE — PROGRESS NOTES
ANTICOAGULATION MANAGEMENT     Renetta Reid 52 year old female is on warfarin with therapeutic INR result. (Goal INR 2.0-3.0)    Recent labs: (last 7 days)     02/20/23  1352   INR 2.3*       ASSESSMENT       Source(s): Chart Review and Patient/Caregiver Call       Warfarin doses taken: Warfarin taken as instructed    Diet: No new diet changes identified    New illness, injury, or hospitalization: No    Medication/supplement changes: None noted    Signs or symptoms of bleeding or clotting: No    Previous INR: Therapeutic last 2(+) visits    Additional findings: None       PLAN     Recommended plan for no diet, medication or health factor changes affecting INR     Dosing Instructions: Continue your current warfarin dose with next INR in 6 weeks       Summary  As of 2/20/2023    Full warfarin instructions:  1.25 mg every Tue, Fri; 2.5 mg all other days   Next INR check:  4/3/2023             Telephone call with Renetta who verbalizes understanding and agrees to plan and who agrees to plan and repeated back plan correctly    Lab visit scheduled    Education provided:     None required    Plan made per [facility] anticoagulation protocol    Rhiannon Weber, RN  Anticoagulation Clinic  2/20/2023    _______________________________________________________________________     Anticoagulation Episode Summary     Current INR goal:  2.0-3.0   TTR:  76.8 % (1 y)   Target end date:  Indefinite   Send INR reminders to:  [facility]    Indications    Long term current use of anticoagulants with INR goal of 2.0-3.0 [Z79.01]  Anti-cardiolipin antibody syndrome (H) [D68.61]           Comments:           Anticoagulation Care Providers     Provider Role Specialty Phone number    Hortencia Marcus MD Referring Internal Medicine [phone]

## 2023-03-13 DIAGNOSIS — D68.9 COAGULOPATHY (H): ICD-10-CM

## 2023-03-13 DIAGNOSIS — Z79.01 LONG TERM CURRENT USE OF ANTICOAGULANTS WITH INR GOAL OF 2.0-3.0: ICD-10-CM

## 2023-03-15 RX ORDER — WARFARIN SODIUM 2.5 MG/1
TABLET ORAL
Qty: 30 TABLET | Refills: 5 | Status: SHIPPED | OUTPATIENT
Start: 2023-03-15 | End: 2023-10-06

## 2023-03-15 NOTE — TELEPHONE ENCOUNTER
ANTICOAGULATION MANAGEMENT:  Medication Refill    Anticoagulation Summary  As of 2/20/2023    Warfarin maintenance plan:  1.25 mg (2.5 mg x 0.5) every Tue, Fri; 2.5 mg (2.5 mg x 1) all other days   Next INR check:  4/3/2023   Target end date:  Indefinite    Indications    Long term current use of anticoagulants with INR goal of 2.0-3.0 [Z79.01]  Anti-cardiolipin antibody syndrome (H) [D68.61]             Anticoagulation Care Providers     Provider Role Specialty Phone number    Hortencia Marcus MD Referring Internal Medicine [phone]          Visit with referring provider/group within last year: Yes    [facility] referral signed within last year: Yes    Renetta meets all criteria for refill (current [facility] referral, office visit with referring provider/group in last year, lab monitoring up to date or not exceeding 2 weeks overdue). Rx instructions and quantity match patient's current dosing plan. 30 day supply with 5 refills granted per [facility] protocol     Maryann Gutierrez RN  Anticoagulation Clinic

## 2023-04-05 ENCOUNTER — LAB (OUTPATIENT)
Dept: LAB | Facility: CLINIC | Age: 53
End: 2023-04-05
Payer: COMMERCIAL

## 2023-04-05 ENCOUNTER — ANTICOAGULATION THERAPY VISIT (OUTPATIENT)
Dept: ANTICOAGULATION | Facility: CLINIC | Age: 53
End: 2023-04-05

## 2023-04-05 DIAGNOSIS — Z79.01 LONG TERM CURRENT USE OF ANTICOAGULANTS WITH INR GOAL OF 2.0-3.0: Primary | ICD-10-CM

## 2023-04-05 DIAGNOSIS — D68.61 ANTI-CARDIOLIPIN ANTIBODY SYNDROME (H): ICD-10-CM

## 2023-04-05 DIAGNOSIS — Z79.01 LONG TERM CURRENT USE OF ANTICOAGULANTS WITH INR GOAL OF 2.0-3.0: ICD-10-CM

## 2023-04-05 LAB — INR BLD: 2.2 (ref 0.9–1.1)

## 2023-04-05 PROCEDURE — 85610 PROTHROMBIN TIME: CPT

## 2023-04-05 PROCEDURE — 36416 COLLJ CAPILLARY BLOOD SPEC: CPT

## 2023-04-05 NOTE — PROGRESS NOTES
ANTICOAGULATION MANAGEMENT     Renetta Reid 52 year old female is on warfarin with therapeutic INR result. (Goal INR 2.0-3.0)    Recent labs: (last 7 days)     04/05/23  1434   INR 2.2*       ASSESSMENT       Source(s): Chart Review and Patient/Caregiver Call       Warfarin doses taken: Warfarin taken as instructed    Diet: No new diet changes identified    New illness, injury, or hospitalization: No    Medication/supplement changes: None noted    Signs or symptoms of bleeding or clotting: No    Previous INR: Therapeutic last 2(+) visits    Additional findings: None         PLAN     Recommended plan for no diet, medication or health factor changes affecting INR     Dosing Instructions: Continue your current warfarin dose with next INR in 6 weeks       Summary  As of 4/5/2023    Full warfarin instructions:  1.25 mg every Tue, Fri; 2.5 mg all other days   Next INR check:  5/17/2023             Telephone call with Renetta who agrees to plan and repeated back plan correctly    Lab visit scheduled    Education provided:     Please call back if any changes to your diet, medications or how you've been taking warfarin    Plan made per [facility] anticoagulation protocol    Jennifer Matias RN  Anticoagulation Clinic  4/5/2023    _______________________________________________________________________     Anticoagulation Episode Summary     Current INR goal:  2.0-3.0   TTR:  76.8 % (1 y)   Target end date:  Indefinite   Send INR reminders to:  [facility]    Indications    Long term current use of anticoagulants with INR goal of 2.0-3.0 [Z79.01]  Anti-cardiolipin antibody syndrome (H) [D68.61]           Comments:           Anticoagulation Care Providers     Provider Role Specialty Phone number    Hortencia Marcus MD Referring Internal Medicine [phone]

## 2023-04-20 DIAGNOSIS — L50.9 URTICARIA: ICD-10-CM

## 2023-04-21 RX ORDER — HYDROXYZINE HYDROCHLORIDE 10 MG/1
20 TABLET, FILM COATED ORAL AT BEDTIME
Qty: 180 TABLET | Refills: 1 | Status: SHIPPED | OUTPATIENT
Start: 2023-04-21 | End: 2023-10-25

## 2023-05-15 ENCOUNTER — LAB (OUTPATIENT)
Dept: LAB | Facility: CLINIC | Age: 53
End: 2023-05-15
Payer: COMMERCIAL

## 2023-05-15 ENCOUNTER — ANTICOAGULATION THERAPY VISIT (OUTPATIENT)
Dept: ANTICOAGULATION | Facility: CLINIC | Age: 53
End: 2023-05-15

## 2023-05-15 DIAGNOSIS — D68.61 ANTI-CARDIOLIPIN ANTIBODY SYNDROME (H): ICD-10-CM

## 2023-05-15 DIAGNOSIS — Z79.01 LONG TERM CURRENT USE OF ANTICOAGULANTS WITH INR GOAL OF 2.0-3.0: Primary | ICD-10-CM

## 2023-05-15 DIAGNOSIS — Z79.01 LONG TERM CURRENT USE OF ANTICOAGULANTS WITH INR GOAL OF 2.0-3.0: ICD-10-CM

## 2023-05-15 LAB — INR BLD: 2.4 (ref 0.9–1.1)

## 2023-05-15 PROCEDURE — 36415 COLL VENOUS BLD VENIPUNCTURE: CPT

## 2023-05-15 PROCEDURE — 85610 PROTHROMBIN TIME: CPT

## 2023-05-15 NOTE — PROGRESS NOTES
ANTICOAGULATION MANAGEMENT     Renetta Reid 52 year old female is on warfarin with therapeutic INR result. (Goal INR 2.0-3.0)    Recent labs: (last 7 days)     05/15/23  1522   INR 2.4*       ASSESSMENT       Source(s): Chart Review and Patient/Caregiver Call       Warfarin doses taken: Warfarin taken as instructed    Diet: No new diet changes identified    Medication/supplement changes: None noted    New illness, injury, or hospitalization: No    Signs or symptoms of bleeding or clotting: No    Previous result: Therapeutic last 2(+) visits    Additional findings: None         PLAN     Recommended plan for no diet, medication or health factor changes affecting INR     Dosing Instructions: Continue your current warfarin dose with next INR in 6 weeks       Summary  As of 5/15/2023    Full warfarin instructions:  1.25 mg every Tue, Fri; 2.5 mg all other days   Next INR check:  6/26/2023             Telephone call with Renetta who verbalizes understanding and agrees to plan    Lab visit scheduled    Education provided:     Please call back if any changes to your diet, medications or how you've been taking warfarin    Plan made per [facility] anticoagulation protocol    Shanda Lu RN  Anticoagulation Clinic  5/15/2023    _______________________________________________________________________     Anticoagulation Episode Summary     Current INR goal:  2.0-3.0   TTR:  76.8 % (1 y)   Target end date:  Indefinite   Send INR reminders to:  [facility]    Indications    Long term current use of anticoagulants with INR goal of 2.0-3.0 [Z79.01]  Anti-cardiolipin antibody syndrome (H) [D68.61]           Comments:           Anticoagulation Care Providers     Provider Role Specialty Phone number    Hortencia Marcus MD Referring Internal Medicine [phone]

## 2023-06-11 ENCOUNTER — HEALTH MAINTENANCE LETTER (OUTPATIENT)
Age: 53
End: 2023-06-11

## 2023-06-12 ENCOUNTER — PATIENT OUTREACH (OUTPATIENT)
Dept: CARE COORDINATION | Facility: CLINIC | Age: 53
End: 2023-06-12
Payer: COMMERCIAL

## 2023-06-26 ENCOUNTER — LAB (OUTPATIENT)
Dept: LAB | Facility: CLINIC | Age: 53
End: 2023-06-26
Payer: COMMERCIAL

## 2023-06-26 ENCOUNTER — ANTICOAGULATION THERAPY VISIT (OUTPATIENT)
Dept: ANTICOAGULATION | Facility: CLINIC | Age: 53
End: 2023-06-26

## 2023-06-26 ENCOUNTER — PATIENT OUTREACH (OUTPATIENT)
Dept: CARE COORDINATION | Facility: CLINIC | Age: 53
End: 2023-06-26

## 2023-06-26 DIAGNOSIS — Z79.01 LONG TERM CURRENT USE OF ANTICOAGULANTS WITH INR GOAL OF 2.0-3.0: ICD-10-CM

## 2023-06-26 DIAGNOSIS — D68.61 ANTI-CARDIOLIPIN ANTIBODY SYNDROME (H): ICD-10-CM

## 2023-06-26 DIAGNOSIS — Z79.01 LONG TERM CURRENT USE OF ANTICOAGULANTS WITH INR GOAL OF 2.0-3.0: Primary | ICD-10-CM

## 2023-06-26 LAB — INR BLD: 2.6 (ref 0.9–1.1)

## 2023-06-26 PROCEDURE — 85610 PROTHROMBIN TIME: CPT

## 2023-06-26 PROCEDURE — 36415 COLL VENOUS BLD VENIPUNCTURE: CPT

## 2023-06-26 NOTE — PROGRESS NOTES
ANTICOAGULATION MANAGEMENT     Renetta Reid 52 year old female is on warfarin with therapeutic INR result. (Goal INR 2.0-3.0)    Recent labs: (last 7 days)     06/26/23  1439   INR 2.6*       ASSESSMENT       Source(s): Chart Review and Patient/Caregiver Call       Warfarin doses taken: Warfarin taken as instructed    Diet: No new diet changes identified    Medication/supplement changes: None noted    New illness, injury, or hospitalization: No    Signs or symptoms of bleeding or clotting: No    Previous result: Therapeutic last 2(+) visits    Additional findings: None         PLAN     Recommended plan for no diet, medication or health factor changes affecting INR     Dosing Instructions: Continue your current warfarin dose with next INR in 6 weeks       Summary  As of 6/26/2023    Full warfarin instructions:  1.25 mg every Tue, Fri; 2.5 mg all other days   Next INR check:  8/7/2023             Telephone call with Renetta who verbalizes understanding and agrees to plan    Lab visit scheduled    Education provided:     Please call back if any changes to your diet, medications or how you've been taking warfarin    Contact [phone]  with any changes, questions or concerns.     Discussed with patient she is due for annual exam in July    Plan made per [facility] anticoagulation protocol    Elaine Lu RN  Anticoagulation Clinic  6/26/2023    _______________________________________________________________________     Anticoagulation Episode Summary     Current INR goal:  2.0-3.0   TTR:  81.1 % (1 y)   Target end date:  Indefinite   Send INR reminders to:  [facility]    Indications    Long term current use of anticoagulants with INR goal of 2.0-3.0 [Z79.01]  Anti-cardiolipin antibody syndrome (H) [D68.61]           Comments:           Anticoagulation Care Providers     Provider Role Specialty Phone number    Hortencia Marcus MD Referring Internal Medicine [phone]

## 2023-08-07 ENCOUNTER — ANTICOAGULATION THERAPY VISIT (OUTPATIENT)
Dept: ANTICOAGULATION | Facility: CLINIC | Age: 53
End: 2023-08-07

## 2023-08-07 ENCOUNTER — LAB (OUTPATIENT)
Dept: LAB | Facility: CLINIC | Age: 53
End: 2023-08-07
Payer: COMMERCIAL

## 2023-08-07 DIAGNOSIS — Z79.01 LONG TERM CURRENT USE OF ANTICOAGULANTS WITH INR GOAL OF 2.0-3.0: ICD-10-CM

## 2023-08-07 DIAGNOSIS — Z79.01 LONG TERM CURRENT USE OF ANTICOAGULANTS WITH INR GOAL OF 2.0-3.0: Primary | ICD-10-CM

## 2023-08-07 DIAGNOSIS — D68.61 ANTI-CARDIOLIPIN ANTIBODY SYNDROME (H): ICD-10-CM

## 2023-08-07 LAB — INR BLD: 2.4 (ref 0.9–1.1)

## 2023-08-07 PROCEDURE — 85610 PROTHROMBIN TIME: CPT

## 2023-08-07 PROCEDURE — 36416 COLLJ CAPILLARY BLOOD SPEC: CPT

## 2023-08-07 NOTE — PROGRESS NOTES
ANTICOAGULATION MANAGEMENT     Renetta Reid 52 year old female is on warfarin with therapeutic INR result. (Goal INR 2.0-3.0)    Recent labs: (last 7 days)     08/07/23  1540   INR 2.4*       ASSESSMENT     Source(s): Chart Review and Patient/Caregiver Call     Warfarin doses taken: Warfarin taken as instructed  Diet: No new diet changes identified  Medication/supplement changes: None noted  New illness, injury, or hospitalization: No  Signs or symptoms of bleeding or clotting: No  Previous result: Therapeutic last 2(+) visits  Additional findings: None       PLAN     Recommended plan for no diet, medication or health factor changes affecting INR     Dosing Instructions: Continue your current warfarin dose with next INR in 6 weeks       Summary  As of 8/7/2023      Full warfarin instructions:  1.25 mg every Tue, Fri; 2.5 mg all other days   Next INR check:  9/18/2023               Telephone call with Renetta who verbalizes understanding and agrees to plan and who agrees to plan and repeated back plan correctly    Lab visit scheduled    Education provided:   Taking warfarin: Importance of taking warfarin as instructed  Goal range and lab monitoring: goal range and significance of current result and Importance of therapeutic range    Plan made per [facility] anticoagulation protocol    Riana Cui, RN  Anticoagulation Clinic  8/7/2023    _______________________________________________________________________     Anticoagulation Episode Summary       Current INR goal:  2.0-3.0   TTR:  85.2 % (1 y)   Target end date:  Indefinite   Send INR reminders to:  [facility]    Indications    Long term current use of anticoagulants with INR goal of 2.0-3.0 [Z79.01]  Anti-cardiolipin antibody syndrome (H) [D68.61]             Comments:               Anticoagulation Care Providers       Provider Role Specialty Phone number    Hortencia Marcus MD Referring Internal Medicine [phone]

## 2023-08-20 ENCOUNTER — HEALTH MAINTENANCE LETTER (OUTPATIENT)
Age: 53
End: 2023-08-20

## 2023-08-21 DIAGNOSIS — K21.9 GASTROESOPHAGEAL REFLUX DISEASE WITHOUT ESOPHAGITIS: ICD-10-CM

## 2023-08-24 ENCOUNTER — DOCUMENTATION ONLY (OUTPATIENT)
Dept: ANTICOAGULATION | Facility: CLINIC | Age: 53
End: 2023-08-24
Payer: COMMERCIAL

## 2023-08-24 DIAGNOSIS — D68.61 ANTI-CARDIOLIPIN ANTIBODY SYNDROME (H): Primary | ICD-10-CM

## 2023-08-24 DIAGNOSIS — Z79.01 LONG TERM CURRENT USE OF ANTICOAGULANTS WITH INR GOAL OF 2.0-3.0: ICD-10-CM

## 2023-08-24 NOTE — PROGRESS NOTES
ANTICOAGULATION CLINIC REFERRAL RENEWAL REQUEST       An annual renewal order is required for all patients referred to [facility].?  Please review and sign the pended referral order for Renetta Reid.       ANTICOAGULATION SUMMARY      Warfarin indication(s)   Anti-cardiolipin antibody syndrome    Mechanical heart valve present?  NO       Current goal range   INR: 2.0-3.0     Goal appropriate for indication? Goal INR 2-3, standard for indication(s) above     Time in Therapeutic Range (TTR)  (Goal > 60%) 85.2%       Office visit with referring provider's group within last year No, last office visit 7/12/22        Jennifer Matias RN  [facility]

## 2023-09-01 NOTE — TELEPHONE ENCOUNTER
Called and left patient a voice mail message on September 1, 2023 2:52 PM to call back the clinic. Third attempt.    Thank you,  Juan Salgado, Triage RN [facility]  2:52 PM 9/1/2023

## 2023-09-18 ENCOUNTER — ANTICOAGULATION THERAPY VISIT (OUTPATIENT)
Dept: ANTICOAGULATION | Facility: CLINIC | Age: 53
End: 2023-09-18

## 2023-09-18 ENCOUNTER — LAB (OUTPATIENT)
Dept: LAB | Facility: CLINIC | Age: 53
End: 2023-09-18
Payer: COMMERCIAL

## 2023-09-18 DIAGNOSIS — Z79.01 LONG TERM CURRENT USE OF ANTICOAGULANTS WITH INR GOAL OF 2.0-3.0: Primary | ICD-10-CM

## 2023-09-18 DIAGNOSIS — Z79.01 LONG TERM CURRENT USE OF ANTICOAGULANTS WITH INR GOAL OF 2.0-3.0: ICD-10-CM

## 2023-09-18 DIAGNOSIS — D68.61 ANTI-CARDIOLIPIN ANTIBODY SYNDROME (H): ICD-10-CM

## 2023-09-18 LAB — INR BLD: 2.4 (ref 0.9–1.1)

## 2023-09-18 PROCEDURE — 36416 COLLJ CAPILLARY BLOOD SPEC: CPT

## 2023-09-18 PROCEDURE — 85610 PROTHROMBIN TIME: CPT

## 2023-09-18 NOTE — PROGRESS NOTES
ANTICOAGULATION MANAGEMENT     Renetta Reid 52 year old female is on warfarin with therapeutic INR result. (Goal INR 2.0-3.0)    Recent labs: (last 7 days)     09/18/23  1348   INR 2.4*       ASSESSMENT     Source(s): Chart Review  Previous INR was Therapeutic last 2(+) visits  Medication, diet, health changes since last INR chart reviewed; none identified         PLAN     Recommended plan for no diet, medication or health factor changes affecting INR per chart review     Dosing Instructions: Continue your current warfarin dose with next INR in 6 weeks       Summary  As of 9/18/2023      Full warfarin instructions:  1.25 mg every Tue, Fri; 2.5 mg all other days   Next INR check:  10/30/2023               Detailed voice message left for Renetta with dosing instructions and follow up date.     Contact [phone]  to schedule and with any changes, questions or concerns.      Education provided:   Please call back if any changes to your diet, medications or how you've been taking warfarin  Contact [phone]  with any changes, questions or concerns.     Plan made per [facility] anticoagulation protocol    Elaine Lu RN  Anticoagulation Clinic  9/18/2023    _______________________________________________________________________     Anticoagulation Episode Summary       Current INR goal:  2.0-3.0   TTR:  88.5 % (1 y)   Target end date:  Indefinite   Send INR reminders to:  [facility]    Indications    Long term current use of anticoagulants with INR goal of 2.0-3.0 [Z79.01]  Anti-cardiolipin antibody syndrome (H) [D68.61]             Comments:               Anticoagulation Care Providers       Provider Role Specialty Phone number    Hortencia Marcus MD Referring Internal Medicine [phone]

## 2023-10-04 DIAGNOSIS — I10 BENIGN ESSENTIAL HYPERTENSION: ICD-10-CM

## 2023-10-04 RX ORDER — AMLODIPINE BESYLATE 5 MG/1
5 TABLET ORAL DAILY
Qty: 90 TABLET | Refills: 3 | Status: SHIPPED | OUTPATIENT
Start: 2023-10-04

## 2023-10-06 DIAGNOSIS — D68.9 COAGULOPATHY (H): ICD-10-CM

## 2023-10-06 RX ORDER — WARFARIN SODIUM 2.5 MG/1
TABLET ORAL
Qty: 90 TABLET | Refills: 0 | Status: SHIPPED | OUTPATIENT
Start: 2023-10-06 | End: 2024-01-10

## 2023-10-06 NOTE — TELEPHONE ENCOUNTER
Rx for warfarin refilled per protocol. #90 tablets approved  Pt is due for annual visit with PCP. Last visit 8/2/22

## 2023-10-24 DIAGNOSIS — L50.9 URTICARIA: ICD-10-CM

## 2023-10-25 RX ORDER — HYDROXYZINE HYDROCHLORIDE 10 MG/1
20 TABLET, FILM COATED ORAL AT BEDTIME
Qty: 180 TABLET | Refills: 1 | Status: SHIPPED | OUTPATIENT
Start: 2023-10-25 | End: 2024-04-25

## 2023-11-06 ENCOUNTER — TELEPHONE (OUTPATIENT)
Dept: ANTICOAGULATION | Facility: CLINIC | Age: 53
End: 2023-11-06
Payer: COMMERCIAL

## 2023-11-06 NOTE — TELEPHONE ENCOUNTER
ANTICOAGULATION     Renetta Reid is overdue for an INR check.     Spoke with Renetta and scheduled lab appointment on 11/7/23    Maryann Gutierrez RN

## 2023-11-07 ENCOUNTER — LAB (OUTPATIENT)
Dept: LAB | Facility: CLINIC | Age: 53
End: 2023-11-07
Payer: COMMERCIAL

## 2023-11-07 ENCOUNTER — ANTICOAGULATION THERAPY VISIT (OUTPATIENT)
Dept: ANTICOAGULATION | Facility: CLINIC | Age: 53
End: 2023-11-07

## 2023-11-07 DIAGNOSIS — D68.61 ANTI-CARDIOLIPIN ANTIBODY SYNDROME (H): ICD-10-CM

## 2023-11-07 DIAGNOSIS — Z79.01 LONG TERM CURRENT USE OF ANTICOAGULANTS WITH INR GOAL OF 2.0-3.0: Primary | ICD-10-CM

## 2023-11-07 DIAGNOSIS — Z79.01 LONG TERM CURRENT USE OF ANTICOAGULANTS WITH INR GOAL OF 2.0-3.0: ICD-10-CM

## 2023-11-07 LAB — INR BLD: 3.1 (ref 0.9–1.1)

## 2023-11-07 PROCEDURE — 85610 PROTHROMBIN TIME: CPT

## 2023-11-07 PROCEDURE — 36416 COLLJ CAPILLARY BLOOD SPEC: CPT

## 2023-11-07 NOTE — PROGRESS NOTES
ANTICOAGULATION MANAGEMENT     Renetta Reid 53 year old female is on warfarin with supratherapeutic INR result. (Goal INR 2.0-3.0)    Recent labs: (last 7 days)     11/07/23  1019   INR 3.1*       ASSESSMENT     Source(s): Chart Review and Patient/Caregiver Call     Warfarin doses taken: Warfarin taken as instructed  Diet: No new diet changes identified  Medication/supplement changes: None noted  New illness, injury, or hospitalization: No  Signs or symptoms of bleeding or clotting: No  Previous result: Therapeutic last 2(+) visits  Additional findings: None       PLAN     Recommended plan for no diet, medication or health factor changes affecting INR     Dosing Instructions: Continue your current warfarin dose with next INR in 2 weeks       Summary  As of 11/7/2023      Full warfarin instructions:  1.25 mg every Tue, Fri; 2.5 mg all other days   Next INR check:  11/21/2023               Telephone call with Renetta who verbalizes understanding and agrees to plan    Lab visit scheduled    Education provided:   Please call back if any changes to your diet, medications or how you've been taking warfarin  Contact [phone]  with any changes, questions or concerns.     Plan made per [facility] anticoagulation protocol    Elaine Lu RN  Anticoagulation Clinic  11/7/2023    _______________________________________________________________________     Anticoagulation Episode Summary       Current INR goal:  2.0-3.0   TTR:  86.5% (1 y)   Target end date:  Indefinite   Send INR reminders to:  [facility]    Indications    Long term current use of anticoagulants with INR goal of 2.0-3.0 [Z79.01]  Anti-cardiolipin antibody syndrome (H24) [D68.61]             Comments:               Anticoagulation Care Providers       Provider Role Specialty Phone number    Hortencia Marcus MD Referring Internal Medicine [phone]

## 2023-11-21 ENCOUNTER — ANTICOAGULATION THERAPY VISIT (OUTPATIENT)
Dept: ANTICOAGULATION | Facility: CLINIC | Age: 53
End: 2023-11-21

## 2023-11-21 ENCOUNTER — LAB (OUTPATIENT)
Dept: LAB | Facility: CLINIC | Age: 53
End: 2023-11-21
Payer: COMMERCIAL

## 2023-11-21 DIAGNOSIS — Z79.01 LONG TERM CURRENT USE OF ANTICOAGULANTS WITH INR GOAL OF 2.0-3.0: Primary | ICD-10-CM

## 2023-11-21 DIAGNOSIS — D68.61 ANTI-CARDIOLIPIN ANTIBODY SYNDROME (H): ICD-10-CM

## 2023-11-21 DIAGNOSIS — Z79.01 LONG TERM CURRENT USE OF ANTICOAGULANTS WITH INR GOAL OF 2.0-3.0: ICD-10-CM

## 2023-11-21 DIAGNOSIS — K21.9 GASTROESOPHAGEAL REFLUX DISEASE WITHOUT ESOPHAGITIS: ICD-10-CM

## 2023-11-21 LAB — INR BLD: 2.2 (ref 0.9–1.1)

## 2023-11-21 PROCEDURE — 85610 PROTHROMBIN TIME: CPT

## 2023-11-21 PROCEDURE — 36415 COLL VENOUS BLD VENIPUNCTURE: CPT

## 2023-11-21 NOTE — PROGRESS NOTES
ANTICOAGULATION MANAGEMENT     Renetta Reid 53 year old female is on warfarin with therapeutic INR result. (Goal INR 2.0-3.0)    Recent labs: (last 7 days)     11/21/23  1431   INR 2.2*       ASSESSMENT     Source(s): Chart Review and Patient/Caregiver Call     Warfarin doses taken: Warfarin taken as instructed  Diet: No new diet changes identified  Medication/supplement changes: None noted  New illness, injury, or hospitalization: No  Signs or symptoms of bleeding or clotting: No  Previous result: Supratherapeutic  Additional findings: None       PLAN     Recommended plan for no diet, medication or health factor changes affecting INR     Dosing Instructions: Continue your current warfarin dose with next INR in 3 weeks       Summary  As of 11/21/2023      Full warfarin instructions:  1.25 mg every Tue, Fri; 2.5 mg all other days   Next INR check:  12/12/2023               Telephone call with Renetta who verbalizes understanding and agrees to plan    Lab visit scheduled    Education provided:   None required    Plan made per [facility] anticoagulation protocol    Maryann Gutierrez, RN  Anticoagulation Clinic  11/21/2023    _______________________________________________________________________     Anticoagulation Episode Summary       Current INR goal:  2.0-3.0   TTR:  87.5% (1 y)   Target end date:  Indefinite   Send INR reminders to:  [facility]    Indications    Long term current use of anticoagulants with INR goal of 2.0-3.0 [Z79.01]  Anti-cardiolipin antibody syndrome (H24) [D68.61]             Comments:               Anticoagulation Care Providers       Provider Role Specialty Phone number    Hortencia Marcus MD Referring Internal Medicine [phone]

## 2023-12-12 ENCOUNTER — LAB (OUTPATIENT)
Dept: LAB | Facility: CLINIC | Age: 53
End: 2023-12-12
Payer: COMMERCIAL

## 2023-12-12 ENCOUNTER — ANTICOAGULATION THERAPY VISIT (OUTPATIENT)
Dept: ANTICOAGULATION | Facility: CLINIC | Age: 53
End: 2023-12-12

## 2023-12-12 DIAGNOSIS — Z79.01 LONG TERM CURRENT USE OF ANTICOAGULANTS WITH INR GOAL OF 2.0-3.0: Primary | ICD-10-CM

## 2023-12-12 DIAGNOSIS — D68.61 ANTI-CARDIOLIPIN ANTIBODY SYNDROME (H): ICD-10-CM

## 2023-12-12 DIAGNOSIS — Z79.01 LONG TERM CURRENT USE OF ANTICOAGULANTS WITH INR GOAL OF 2.0-3.0: ICD-10-CM

## 2023-12-12 LAB — INR BLD: 2.9 (ref 0.9–1.1)

## 2023-12-12 PROCEDURE — 36415 COLL VENOUS BLD VENIPUNCTURE: CPT

## 2023-12-12 PROCEDURE — 85610 PROTHROMBIN TIME: CPT

## 2023-12-12 NOTE — PROGRESS NOTES
ANTICOAGULATION MANAGEMENT     Renetta Reid 53 year old female is on warfarin with therapeutic INR result. (Goal INR 2.0-3.0)    Recent labs: (last 7 days)     12/12/23  1517   INR 2.9*       ASSESSMENT     Source(s): Chart Review  Previous INR was Therapeutic last visit; previously outside of goal range  Medication, diet, health changes since last INR chart reviewed; none identified  I left a detailed voicemail with the orders reflected in flowsheet. I have also requested a call back if there have been any missed doses, concerns, illness, fever, or if there have been any changes in medications, activity level, or diet         PLAN     Recommended plan for no diet, medication or health factor changes affecting INR     Dosing Instructions: Continue your current warfarin dose with next INR in 4 weeks       Summary  As of 12/12/2023      Full warfarin instructions:  1.25 mg every Tue, Fri; 2.5 mg all other days   Next INR check:  1/9/2024               Detailed voice message left for pt with dosing instructions and follow up date.     Contact [phone]  to schedule and with any changes, questions or concerns.     Education provided:   Please call back if any changes to your diet, medications or how you've been taking warfarin    Plan made per [facility] anticoagulation protocol    Faith Alamo, RN  Anticoagulation Clinic  12/12/2023    _______________________________________________________________________     Anticoagulation Episode Summary       Current INR goal:  2.0-3.0   TTR:  93.2% (1 y)   Target end date:  Indefinite   Send INR [facility]ers [facility]:  [facility]    Indications    Long term current use of anticoagulants with INR goal of 2.0-3.0 [Z79.01]  Anti-cardiolipin antibody syndrome (H24) [D68.61]             Comments:               Anticoagulation Care Providers       Provider Role Specialty Phone number    Hortencia Marcus MD Referring Internal Medicine [phone]

## 2024-01-10 ENCOUNTER — LAB (OUTPATIENT)
Dept: LAB | Facility: CLINIC | Age: 54
End: 2024-01-10
Payer: COMMERCIAL

## 2024-01-10 ENCOUNTER — ANTICOAGULATION THERAPY VISIT (OUTPATIENT)
Dept: ANTICOAGULATION | Facility: CLINIC | Age: 54
End: 2024-01-10

## 2024-01-10 DIAGNOSIS — D68.9 COAGULOPATHY (H): ICD-10-CM

## 2024-01-10 DIAGNOSIS — D68.61 ANTI-CARDIOLIPIN ANTIBODY SYNDROME (H): ICD-10-CM

## 2024-01-10 DIAGNOSIS — Z79.01 LONG TERM CURRENT USE OF ANTICOAGULANTS WITH INR GOAL OF 2.0-3.0: Primary | ICD-10-CM

## 2024-01-10 DIAGNOSIS — Z79.01 LONG TERM CURRENT USE OF ANTICOAGULANTS WITH INR GOAL OF 2.0-3.0: ICD-10-CM

## 2024-01-10 LAB — INR BLD: 1.5 (ref 0.9–1.1)

## 2024-01-10 PROCEDURE — 36415 COLL VENOUS BLD VENIPUNCTURE: CPT

## 2024-01-10 PROCEDURE — 85610 PROTHROMBIN TIME: CPT

## 2024-01-10 RX ORDER — WARFARIN SODIUM 2.5 MG/1
TABLET ORAL
Qty: 78 TABLET | Refills: 0 | Status: SHIPPED | OUTPATIENT
Start: 2024-01-10 | End: 2024-04-17

## 2024-01-10 NOTE — PROGRESS NOTES
ANTICOAGULATION MANAGEMENT     Renetta Reid 53 year old female is on warfarin with subtherapeutic INR result. (Goal INR 2.0-3.0)    Recent labs: (last 7 days)     01/10/24  1538   INR 1.5*       ASSESSMENT     Source(s): Chart Review and Patient/Caregiver Call     Warfarin doses taken: Warfarin taken as instructed  Diet: No new diet changes identified  Medication/supplement changes: None noted  New illness, injury, or hospitalization: No  Signs or symptoms of bleeding or clotting: No  Previous result: Therapeutic last 2(+) visits  Additional findings: None       PLAN     Recommended plan for no diet, medication or health factor changes affecting INR, patient denies any changes that would interfere with warfarin or missed warfarin doses.     Dosing Instructions: booster dose then continue your current warfarin dose with next INR in 1-2 weeks       Summary  As of 1/10/2024      Full warfarin instructions:  1/10: 5 mg; Otherwise 1.25 mg every Tue, Fri; 2.5 mg all other days   Next INR check:  1/18/2024               Telephone call with Renetta who verbalizes understanding and agrees to plan    Lab visit scheduled    Education provided:   Please call back if any changes to your diet, medications or how you've been taking warfarin  Symptom monitoring: monitoring for clotting signs and symptoms and monitoring for stroke signs and symptoms  Contact [phone]  with any changes, questions or concerns.     Plan made per [facility] anticoagulation protocol    Elaine Lu RN  Anticoagulation Clinic  1/10/2024    _______________________________________________________________________     Anticoagulation Episode Summary       Current INR goal:  2.0-3.0   TTR:  94.8% (1 y)   Target end date:  Indefinite   Send INR reminders to:  [facility]    Indications    Long term current use of anticoagulants with INR goal of 2.0-3.0 [Z79.01]  Anti-cardiolipin antibody syndrome (H24) [D68.61]             Comments:                Anticoagulation Care Providers       Provider Role Specialty Phone number    Hortencia Marcus MD Referring Internal Medicine [phone]

## 2024-01-10 NOTE — TELEPHONE ENCOUNTER
Next 5 appointments (look out 90 days)      Mar 19, 2024  8:30 AM  (Arrive by 8:10 AM)  Adult Preventative Visit with Flakita Gonzalez MD  [facility] [address]  [address]  [facility] [address] [phone]          Will route to Dr. Moran and Dr. Love since Dr. Marcus is no longer with [facility].

## 2024-01-10 NOTE — TELEPHONE ENCOUNTER
ANTICOAGULATION MANAGEMENT:  Medication Refill    Anticoagulation Summary  As of 12/12/2023      Warfarin maintenance plan:  1.25 mg (2.5 mg x 0.5) every Tue, Fri; 2.5 mg (2.5 mg x 1) all other days   Next INR check:  1/9/2024   Target end date:  Indefinite    Indications    Long term current use of anticoagulants with INR goal of 2.0-3.0 [Z79.01]  Anti-cardiolipin antibody syndrome (H24) [D68.61]                 Anticoagulation Care Providers       Provider Role Specialty Phone number    Hortencia Marcus MD Referring Internal Medicine [phone]            Refill Criteria    Visit with referring provider/group: Overdue for referring provider visit, last visit on 08/ 02/2022 was a virtual visit. Patient has appointment scheduled with PCP on 3/14/24.    [facility] referral signed last signed: 08/26/2023; within last year: Yes    Lab monitoring not exceeding 2 weeks overdue: Yes    Renetta does NOT meet all criteria for refill: Visit with referring provider's group was >= 1 year ago. delano mcclain previously given on 10/6/23; routing to referring provider for further refills  per [facility] protocol    Maryann Gutierrez, RN  Anticoagulation Clinic

## 2024-01-18 ENCOUNTER — ANTICOAGULATION THERAPY VISIT (OUTPATIENT)
Dept: ANTICOAGULATION | Facility: CLINIC | Age: 54
End: 2024-01-18

## 2024-01-18 ENCOUNTER — LAB (OUTPATIENT)
Dept: LAB | Facility: CLINIC | Age: 54
End: 2024-01-18
Payer: COMMERCIAL

## 2024-01-18 DIAGNOSIS — D68.61 ANTI-CARDIOLIPIN ANTIBODY SYNDROME (H): ICD-10-CM

## 2024-01-18 DIAGNOSIS — Z79.01 LONG TERM CURRENT USE OF ANTICOAGULANTS WITH INR GOAL OF 2.0-3.0: ICD-10-CM

## 2024-01-18 DIAGNOSIS — Z79.01 LONG TERM CURRENT USE OF ANTICOAGULANTS WITH INR GOAL OF 2.0-3.0: Primary | ICD-10-CM

## 2024-01-18 LAB — INR BLD: 2 (ref 0.9–1.1)

## 2024-01-18 PROCEDURE — 36415 COLL VENOUS BLD VENIPUNCTURE: CPT

## 2024-01-18 PROCEDURE — 85610 PROTHROMBIN TIME: CPT

## 2024-01-18 NOTE — PROGRESS NOTES
ANTICOAGULATION MANAGEMENT     Renetta Reid 53 year old female is on warfarin with therapeutic INR result. (Goal INR 2.0-3.0)    Recent labs: (last 7 days)     01/18/24  1535   INR 2.0*       ASSESSMENT     Source(s): Chart Review and Patient/Caregiver Call     Warfarin doses taken: Warfarin taken as instructed  Diet: No new diet changes identified  Medication/supplement changes: None noted  New illness, injury, or hospitalization: No  Signs or symptoms of bleeding or clotting: No  Previous result: Subtherapeutic  Additional findings: None       PLAN     Recommended plan for no diet, medication or health factor changes affecting INR     Dosing Instructions: Continue your current warfarin dose with next INR in 2-3 weeks.       Summary  As of 1/18/2024      Full warfarin instructions:  1.25 mg every Tue, Fri; 2.5 mg all other days   Next INR check:  2/6/2024               Telephone call with Renetta who agrees to plan and repeated back plan correctly    Lab visit scheduled    Education provided:   Please call back if any changes to your diet, medications or how you've been taking warfarin    Plan made per [facility] anticoagulation protocol    Jennifer Matias RN  Anticoagulation Clinic  1/18/2024    _______________________________________________________________________     Anticoagulation Episode Summary       Current INR goal:  2.0-3.0   TTR:  92.6% (1 y)   Target end date:  Indefinite   Send INR reminders to:  [facility]    Indications    Long term current use of anticoagulants with INR goal of 2.0-3.0 [Z79.01]  Anti-cardiolipin antibody syndrome (H24) [D68.61]             Comments:               Anticoagulation Care Providers       Provider Role Specialty Phone number    Hortencia Marcus MD Referring Internal Medicine [phone]

## 2024-01-24 ENCOUNTER — TRANSFERRED RECORDS (OUTPATIENT)
Dept: HEALTH INFORMATION MANAGEMENT | Facility: CLINIC | Age: 54
End: 2024-01-24
Payer: COMMERCIAL

## 2024-02-06 ENCOUNTER — ANTICOAGULATION THERAPY VISIT (OUTPATIENT)
Dept: ANTICOAGULATION | Facility: CLINIC | Age: 54
End: 2024-02-06

## 2024-02-06 ENCOUNTER — LAB (OUTPATIENT)
Dept: LAB | Facility: CLINIC | Age: 54
End: 2024-02-06
Payer: COMMERCIAL

## 2024-02-06 DIAGNOSIS — D68.61 ANTI-CARDIOLIPIN ANTIBODY SYNDROME (H): ICD-10-CM

## 2024-02-06 DIAGNOSIS — Z79.01 LONG TERM CURRENT USE OF ANTICOAGULANTS WITH INR GOAL OF 2.0-3.0: ICD-10-CM

## 2024-02-06 DIAGNOSIS — Z79.01 LONG TERM CURRENT USE OF ANTICOAGULANTS WITH INR GOAL OF 2.0-3.0: Primary | ICD-10-CM

## 2024-02-06 LAB — INR BLD: 2.2 (ref 0.9–1.1)

## 2024-02-06 PROCEDURE — 36415 COLL VENOUS BLD VENIPUNCTURE: CPT

## 2024-02-06 PROCEDURE — 85610 PROTHROMBIN TIME: CPT

## 2024-02-06 NOTE — PROGRESS NOTES
ANTICOAGULATION MANAGEMENT     Renetta Reid 53 year old female is on warfarin with therapeutic INR result. (Goal INR 2.0-3.0)    Recent labs: (last 7 days)     02/06/24  1520   INR 2.2*       ASSESSMENT     Source(s): Chart Review  Previous INR was Therapeutic last visit; previously outside of goal range  Medication, diet, health changes since last INR chart reviewed; none identified         PLAN     Recommended plan for no diet, medication or health factor changes affecting INR per chart review    Dosing Instructions: Continue your current warfarin dose with next INR in 4 weeks       Summary  As of 2/6/2024      Full warfarin instructions:  1.25 mg every Tue, Fri; 2.5 mg all other days   Next INR check:  3/5/2024               Detailed voice message left for Renetta with dosing instructions and follow up date.     Contact [phone]  to schedule and with any changes, questions or concerns.     Education provided:   Please call back if any changes to your diet, medications or how you've been taking warfarin  Contact [phone]  with any changes, questions or concerns.     Plan made per [facility] anticoagulation protocol    Elaine Lu RN  Anticoagulation Clinic  2/6/2024    _______________________________________________________________________     Anticoagulation Episode Summary       Current INR goal:  2.0-3.0   TTR:  92.6% (1 y)   Target end date:  Indefinite   Send INR reminders to:  [facility]    Indications    Long term current use of anticoagulants with INR goal of 2.0-3.0 [Z79.01]  Anti-cardiolipin antibody syndrome (H24) [D68.61]             Comments:               Anticoagulation Care Providers       Provider Role Specialty Phone number    Hortencia Marcus MD Referring Internal Medicine [phone]

## 2024-02-07 ENCOUNTER — TRANSFERRED RECORDS (OUTPATIENT)
Dept: HEALTH INFORMATION MANAGEMENT | Facility: CLINIC | Age: 54
End: 2024-02-07
Payer: COMMERCIAL

## 2024-02-22 DIAGNOSIS — K21.9 GASTROESOPHAGEAL REFLUX DISEASE WITHOUT ESOPHAGITIS: ICD-10-CM

## 2024-02-29 ENCOUNTER — ANTICOAGULATION THERAPY VISIT (OUTPATIENT)
Dept: ANTICOAGULATION | Facility: CLINIC | Age: 54
End: 2024-02-29

## 2024-02-29 ENCOUNTER — LAB (OUTPATIENT)
Dept: LAB | Facility: CLINIC | Age: 54
End: 2024-02-29
Payer: COMMERCIAL

## 2024-02-29 DIAGNOSIS — D68.61 ANTI-CARDIOLIPIN ANTIBODY SYNDROME (H): ICD-10-CM

## 2024-02-29 DIAGNOSIS — Z79.01 LONG TERM CURRENT USE OF ANTICOAGULANTS WITH INR GOAL OF 2.0-3.0: Primary | ICD-10-CM

## 2024-02-29 DIAGNOSIS — Z79.01 LONG TERM CURRENT USE OF ANTICOAGULANTS WITH INR GOAL OF 2.0-3.0: ICD-10-CM

## 2024-02-29 LAB — INR BLD: 1.9 (ref 0.9–1.1)

## 2024-02-29 PROCEDURE — 85610 PROTHROMBIN TIME: CPT

## 2024-02-29 PROCEDURE — 36416 COLLJ CAPILLARY BLOOD SPEC: CPT

## 2024-02-29 NOTE — PROGRESS NOTES
ANTICOAGULATION MANAGEMENT     Renetta Reid 53 year old female is on warfarin with subtherapeutic INR result. (Goal INR 2.0-3.0)    Recent labs: (last 7 days)     02/29/24  1050   INR 1.9*       ASSESSMENT     Source(s): Chart Review and Patient/Caregiver Call     Warfarin doses taken: Warfarin taken as instructed  Diet: Increased greens/vitamin K in diet; plans to resume previous intake  Medication/supplement changes: None noted  New illness, injury, or hospitalization: No  Signs or symptoms of bleeding or clotting: No  Previous result: Therapeutic last 2(+) visits  Additional findings: None       PLAN     Recommended plan for temporary change(s) affecting INR     Dosing Instructions: booster dose then continue your current warfarin dose with next INR in 1-2 weeks       Summary  As of 2/29/2024      Full warfarin instructions:  2/29: 3.75 mg; Otherwise 1.25 mg every Tue, Fri; 2.5 mg all other days   Next INR check:  3/14/2024               Telephone call with Renetta who verbalizes understanding and agrees to plan    Lab visit scheduled    Education provided:   Please call back if any changes to your diet, medications or how you've been taking warfarin  Dietary considerations: importance of consistent vitamin K intake  Contact [phone]  with any changes, questions or concerns.     Plan made per [facility] anticoagulation protocol    Elaine Lu RN  Anticoagulation Clinic  2/29/2024    _______________________________________________________________________     Anticoagulation Episode Summary       Current INR goal:  2.0-3.0   TTR:  90.5% (1 y)   Target end date:  Indefinite   [facility]    Indications    Long term current use of anticoagulants with INR goal of 2.0-3.0 [Z79.01]  Anti-cardiolipin antibody syndrome (H24) [D68.61]             Comments:               Anticoagulation Care Providers       Provider Role Specialty Phone number    Hortencia Marcus MD Referring Internal  Medicine [phone]

## 2024-03-05 ENCOUNTER — PATIENT OUTREACH (OUTPATIENT)
Dept: CARE COORDINATION | Facility: CLINIC | Age: 54
End: 2024-03-05
Payer: COMMERCIAL

## 2024-03-14 ENCOUNTER — LAB (OUTPATIENT)
Dept: LAB | Facility: CLINIC | Age: 54
End: 2024-03-14
Payer: COMMERCIAL

## 2024-03-14 ENCOUNTER — ANTICOAGULATION THERAPY VISIT (OUTPATIENT)
Dept: ANTICOAGULATION | Facility: CLINIC | Age: 54
End: 2024-03-14

## 2024-03-14 DIAGNOSIS — Z79.01 LONG TERM CURRENT USE OF ANTICOAGULANTS WITH INR GOAL OF 2.0-3.0: ICD-10-CM

## 2024-03-14 DIAGNOSIS — D68.61 ANTI-CARDIOLIPIN ANTIBODY SYNDROME (H): ICD-10-CM

## 2024-03-14 DIAGNOSIS — Z79.01 LONG TERM CURRENT USE OF ANTICOAGULANTS WITH INR GOAL OF 2.0-3.0: Primary | ICD-10-CM

## 2024-03-14 LAB — INR BLD: 2.6 (ref 0.9–1.1)

## 2024-03-14 PROCEDURE — 85610 PROTHROMBIN TIME: CPT

## 2024-03-14 PROCEDURE — 36415 COLL VENOUS BLD VENIPUNCTURE: CPT

## 2024-03-14 NOTE — PROGRESS NOTES
ANTICOAGULATION MANAGEMENT     Renetta Reid 53 year old female is on warfarin with therapeutic INR result. (Goal INR 2.0-3.0)    Recent labs: (last 7 days)     03/14/24  1514   INR 2.6*       ASSESSMENT     Source(s): Chart Review  Previous INR was Subtherapeutic  Medication, diet, health changes since last INR chart reviewed; none identified    I left a detailed voicemail with the orders reflected in flowsheet. I have also requested a call back if there have been any missed doses, concerns, illness, fever, or if there have been any changes in medications, activity level, or diet       PLAN     Recommended plan for no diet, medication or health factor changes affecting INR     Dosing Instructions: Continue your current warfarin dose with next INR in 4 weeks       Summary  As of 3/14/2024      Full warfarin instructions:  1.25 mg every Tue, Fri; 2.5 mg all other days   Next INR check:  4/11/2024               Detailed voice message left for Renetta with dosing instructions and follow up date.     Contact [phone]  to schedule and with any changes, questions or concerns.     Education provided:   Please call back if any changes to your diet, medications or how you've been taking warfarin    Plan made per [facility] anticoagulation protocol    Faith Alamo, RN  Anticoagulation Clinic  3/14/2024    _______________________________________________________________________     Anticoagulation Episode Summary       Current INR goal:  2.0-3.0   TTR:  90.0% (1 y)   Target end date:  Indefinite   Send INR reminders to:  [facility]    Indications    Long term current use of anticoagulants with INR goal of 2.0-3.0 [Z79.01]  Anti-cardiolipin antibody syndrome (H24) [D68.61]             Comments:               Anticoagulation Care Providers       Provider Role Specialty Phone number    Hortencia Marcus MD Referring Internal Medicine [phone]

## 2024-04-02 ENCOUNTER — PATIENT OUTREACH (OUTPATIENT)
Dept: CARE COORDINATION | Facility: CLINIC | Age: 54
End: 2024-04-02
Payer: COMMERCIAL

## 2024-04-11 DIAGNOSIS — D68.9 COAGULOPATHY (H): ICD-10-CM

## 2024-04-11 NOTE — TELEPHONE ENCOUNTER
ANTICOAGULATION MANAGEMENT:  Medication Refill    Anticoagulation Summary  As of 3/14/2024      Warfarin maintenance plan:  1.25 mg (2.5 mg x 0.5) every Tue, Fri; 2.5 mg (2.5 mg x 1) all other days   Next INR check:  4/11/2024   Target end date:  Indefinite    Indications    Long term current use of anticoagulants with INR goal of 2.0-3.0 [Z79.01]  Anti-cardiolipin antibody syndrome (H24) [D68.61]                 Anticoagulation Care Providers       Provider Role Specialty Phone number    Hortencia Marcus MD Referring Internal Medicine [phone]            Refill Criteria    Visit with referring provider/group: Overdue for referring provider visit, last visit on 7/12/22.   Patient has an appointment on 7/11/24 to establish care with Dr. Love.    [facility] referral last signed: 08/26/2023; within last year: Yes    Lab monitoring not exceeding 2 weeks overdue: Yes.      Renetta does NOT meet all criteria for refill: Visit with referring provider's group was >= 1 year ago. delano fill previously given on 1/10/24; routing to referring provider for further refills  per [facility] protocol    Jennifer Matias, RN  Anticoagulation Clinic

## 2024-04-17 RX ORDER — WARFARIN SODIUM 2.5 MG/1
TABLET ORAL
Qty: 30 TABLET | Refills: 0 | Status: SHIPPED | OUTPATIENT
Start: 2024-04-17 | End: 2024-05-28

## 2024-04-17 NOTE — TELEPHONE ENCOUNTER
From request below: Renetta does NOT meet all criteria for refill: Visit with referring provider's group was >= 1 year ago. delano fill previously given on 1/10/24; routing to referring provider for further refills  per [facility] protocol   Elaine Lu RN, BSN  Anticoagulation Clinic

## 2024-04-17 NOTE — TELEPHONE ENCOUNTER
Attempt # 1  Called Phone # [phone]      Was Call answered? No.     Non-detailed voicemail left on April 17, 2024 4:04 PM to call clinic at: [phone].     On Call Back:     Please relay provider message below and help assist with setting up INR appointment if able.      Thank you,  Juan Salgado, Triage RN [facility]  4:04 PM 4/17/2024

## 2024-04-17 NOTE — TELEPHONE ENCOUNTER
I have refilled for only 1 month.  Please advise patient that she needs INR clinic appointment for further refills,

## 2024-04-18 ENCOUNTER — TELEPHONE (OUTPATIENT)
Dept: ANTICOAGULATION | Facility: CLINIC | Age: 54
End: 2024-04-18
Payer: COMMERCIAL

## 2024-04-18 NOTE — TELEPHONE ENCOUNTER
ANTICOAGULATION     Renetta Reid is overdue for an INR check.     Left message for patient to call and schedule lab appointment as soon as possible. If returning call, please schedule.     Virginia Patrick RN

## 2024-04-22 DIAGNOSIS — L50.9 URTICARIA: ICD-10-CM

## 2024-04-22 NOTE — TELEPHONE ENCOUNTER
Spoke with Patient -- she has an appiontment in July for PE . Suggested that Patint set up a medication check before July .    ANTONIETTA Farrar LPN

## 2024-04-23 ENCOUNTER — ANTICOAGULATION THERAPY VISIT (OUTPATIENT)
Dept: ANTICOAGULATION | Facility: CLINIC | Age: 54
End: 2024-04-23

## 2024-04-23 ENCOUNTER — LAB (OUTPATIENT)
Dept: LAB | Facility: CLINIC | Age: 54
End: 2024-04-23
Payer: COMMERCIAL

## 2024-04-23 DIAGNOSIS — Z79.01 LONG TERM CURRENT USE OF ANTICOAGULANTS WITH INR GOAL OF 2.0-3.0: Primary | ICD-10-CM

## 2024-04-23 DIAGNOSIS — D68.61 ANTI-CARDIOLIPIN ANTIBODY SYNDROME (H): ICD-10-CM

## 2024-04-23 DIAGNOSIS — Z79.01 LONG TERM CURRENT USE OF ANTICOAGULANTS WITH INR GOAL OF 2.0-3.0: ICD-10-CM

## 2024-04-23 LAB — INR BLD: 2.2 (ref 0.9–1.1)

## 2024-04-23 PROCEDURE — 85610 PROTHROMBIN TIME: CPT

## 2024-04-23 PROCEDURE — 36416 COLLJ CAPILLARY BLOOD SPEC: CPT

## 2024-04-23 NOTE — PROGRESS NOTES
ANTICOAGULATION MANAGEMENT     Renetta Reid 53 year old female is on warfarin with therapeutic INR result. (Goal INR 2.0-3.0)    Recent labs: (last 7 days)     04/23/24  1520   INR 2.2*       ASSESSMENT     Source(s): Chart Review  Previous INR was Therapeutic last visit; previously outside of goal range  Medication, diet, health changes since last INR chart reviewed; none identified         PLAN     Recommended plan for no diet, medication or health factor changes affecting INR     Dosing Instructions: Continue your current warfarin dose with next INR in 6 weeks       Summary  As of 4/23/2024      Full warfarin instructions:  1.25 mg every Tue, Fri; 2.5 mg all other days   Next INR check:  6/4/2024               Detailed voice message left for Renetta with dosing instructions and follow up date.   Sent Stem message with dosing and follow up instructions    Contact [phone]  to schedule and with any changes, questions or concerns.     Education provided:   Please call back if any changes to your diet, medications or how you've been taking warfarin    Plan made per [facility] anticoagulation protocol    Shanda Lu RN  Anticoagulation Clinic  4/23/2024    _______________________________________________________________________     Anticoagulation Episode Summary       Current INR goal:  2.0-3.0   TTR:  90.0% (1 y)   Target end date:  Indefinite   Send INR reminders to:  [facility]    Indications    Long term current use of anticoagulants with INR goal of 2.0-3.0 [Z79.01]  Anti-cardiolipin antibody syndrome (H24) [D68.61]             Comments:               Anticoagulation Care Providers       Provider Role Specialty Phone number    Hortencia Marcus MD Referring Internal Medicine [phone]

## 2024-04-25 ENCOUNTER — VIRTUAL VISIT (OUTPATIENT)
Dept: FAMILY MEDICINE | Facility: CLINIC | Age: 54
End: 2024-04-25
Payer: COMMERCIAL

## 2024-04-25 DIAGNOSIS — L50.9 URTICARIA: Primary | ICD-10-CM

## 2024-04-25 DIAGNOSIS — Z86.2 HISTORY OF HENOCH-SCHONLEIN PURPURA: ICD-10-CM

## 2024-04-25 DIAGNOSIS — E66.01 MORBID OBESITY (H): ICD-10-CM

## 2024-04-25 DIAGNOSIS — K21.9 GASTROESOPHAGEAL REFLUX DISEASE WITHOUT ESOPHAGITIS: ICD-10-CM

## 2024-04-25 PROCEDURE — 99213 OFFICE O/P EST LOW 20 MIN: CPT | Mod: 95 | Performed by: FAMILY MEDICINE

## 2024-04-25 RX ORDER — HYDROXYZINE HYDROCHLORIDE 10 MG/1
20 TABLET, FILM COATED ORAL AT BEDTIME
Qty: 180 TABLET | Refills: 0 | Status: SHIPPED | OUTPATIENT
Start: 2024-04-25 | End: 2024-07-23

## 2024-04-25 RX ORDER — HYDROXYZINE HYDROCHLORIDE 10 MG/1
20 TABLET, FILM COATED ORAL AT BEDTIME
Qty: 180 TABLET | Refills: 1 | OUTPATIENT
Start: 2024-04-25

## 2024-04-25 NOTE — PROGRESS NOTES
"Renetta is a 53 year old who is being evaluated via a billable video visit.    How would you like to obtain your AVS? MyChart  If the video visit is dropped, the invitation should be resent by: Text to cell phone: [phone]  Will anyone else be joining your video visit? No      Assessment & Plan     Urticaria - using at bedtime for the past couple of decades  - hydrOXYzine HCl (ATARAX) 10 MG tablet; Take 2 tablets (20 mg) by mouth at bedtime    Gastroesophageal reflux disease without esophagitis - stable, refills  - omeprazole (PRILOSEC) 20 MG DR capsule; Take 1 capsule (20 mg) by mouth daily    Morbid obesity (H)    History of Henoch-Schonlein purpura        Subjective   Renetta is a 53 year old, presenting for the following health issues:  Recheck Medication (Follow up on her medications, needs refills, working well. )    HPI     Medication Followup  Taking Medication as prescribed: yes  Side Effects:  None  Medication Helping Symptoms:  yes          Review of Systems  Constitutional, HEENT, cardiovascular, pulmonary, gi and gu systems are negative, except as otherwise noted.      Objective    Vitals - Patient Reported  Weight (Patient Reported): 113.4 kg (250 lb)  Height (Patient Reported): 157.5 cm (5' 2\")  BMI (Based on Pt Reported Ht/Wt): 45.73  Pain Score: No Pain (0)        Physical Exam   Phone visit        Phone visit - lasted 5 minutes  "

## 2024-05-08 ENCOUNTER — TELEPHONE (OUTPATIENT)
Dept: INTERNAL MEDICINE | Facility: CLINIC | Age: 54
End: 2024-05-08
Payer: COMMERCIAL

## 2024-05-08 NOTE — TELEPHONE ENCOUNTER
Patient Quality Outreach    Patient is due for the following:   Colon Cancer Screening    Next Steps:   Patient no longer attends out clinic per patient.    Type of outreach:    Phone, spoke to patient/parent. Patient no longer attends our clinic.    Next Steps:  Reach out within 90 days via Phone.    Max number of attempts reached: Yes. Will try again in 90 days if patient still on fail list.    Questions for provider review:    None           Connie Vazquez MA  Chart routed to none.

## 2024-05-25 DIAGNOSIS — D68.9 COAGULOPATHY (H): ICD-10-CM

## 2024-05-28 RX ORDER — WARFARIN SODIUM 2.5 MG/1
TABLET ORAL
Qty: 30 TABLET | Refills: 0 | Status: SHIPPED | OUTPATIENT
Start: 2024-05-28 | End: 2024-07-02

## 2024-06-04 ENCOUNTER — ANTICOAGULATION THERAPY VISIT (OUTPATIENT)
Dept: ANTICOAGULATION | Facility: CLINIC | Age: 54
End: 2024-06-04

## 2024-06-04 ENCOUNTER — LAB (OUTPATIENT)
Dept: LAB | Facility: CLINIC | Age: 54
End: 2024-06-04
Payer: COMMERCIAL

## 2024-06-04 DIAGNOSIS — Z79.01 LONG TERM CURRENT USE OF ANTICOAGULANTS WITH INR GOAL OF 2.0-3.0: Primary | ICD-10-CM

## 2024-06-04 DIAGNOSIS — Z79.01 LONG TERM CURRENT USE OF ANTICOAGULANTS WITH INR GOAL OF 2.0-3.0: ICD-10-CM

## 2024-06-04 DIAGNOSIS — D68.61 ANTI-CARDIOLIPIN ANTIBODY SYNDROME (H): ICD-10-CM

## 2024-06-04 LAB — INR BLD: 1.6 (ref 0.9–1.1)

## 2024-06-04 PROCEDURE — 85610 PROTHROMBIN TIME: CPT

## 2024-06-04 PROCEDURE — 36416 COLLJ CAPILLARY BLOOD SPEC: CPT

## 2024-06-04 NOTE — PROGRESS NOTES
ANTICOAGULATION MANAGEMENT     Renetta Reid 53 year old female is on warfarin with subtherapeutic INR result. (Goal INR 2.0-3.0)    Recent labs: (last 7 days)     06/04/24  1521   INR 1.6*       ASSESSMENT     Source(s): Chart Review  Previous INR was Therapeutic last 2(+) visits  Medication, diet, health changes since last INR chart reviewed; none identified  INR trending down since 03/2024         PLAN     Unable to reach Renetta today.    Left message to take a booster dose of warfarin,  2.5 mg tonight. Request call back for assessment.    Follow up required to confirm warfarin dose taken and assess for changes and discuss out of range result     Maryann Gutierrez RN  Anticoagulation Clinic  6/4/2024

## 2024-06-05 NOTE — PROGRESS NOTES
Left VM to call [phone] with transfer to [facility] OR [facility]  Maryann Gutierrez RN  Anticoagulation Clinic

## 2024-06-05 NOTE — PROGRESS NOTES
ANTICOAGULATION MANAGEMENT     Renetta Reid 53 year old female is on warfarin with subtherapeutic INR result. (Goal INR 2.0-3.0)    Recent labs: (last 7 days)     06/04/24  1521   INR 1.6*       ASSESSMENT     Source(s): Chart Review and Patient/Caregiver Call     Warfarin doses taken: Missed dose(s) may be affecting INR  Diet: Increased greens/vitamin K in diet; plans to resume previous intake--patient reports 2 large salads, typically eats 1 large salad per week.  Medication/supplement changes: None noted  New illness, injury, or hospitalization: No  Signs or symptoms of bleeding or clotting: No  Previous result: Therapeutic last 2(+) visits  Additional findings:  patient reports she will monitor her vitamin K intake and let us know at next INR visit if she would like to be able to eat more vitamin K from week to week.       PLAN     Recommended plan for temporary change(s) affecting INR     Dosing Instructions: booster dose then continue your current warfarin dose with next INR in 2 weeks       Summary  As of 6/4/2024      Full warfarin instructions:  6/4: 2.5 mg; Otherwise 1.25 mg every Tue, Fri; 2.5 mg all other days   Next INR check:  6/18/2024               Telephone call with Renetta who verbalizes understanding and agrees to plan and who agrees to plan and repeated back plan correctly    Lab visit scheduled    Education provided:   Taking warfarin: Importance of taking warfarin as instructed and how to properly make up for any known missed warfarin doses  Dietary considerations: importance of consistent vitamin K intake, impact of vitamin K foods on INR, and vitamin K content of foods    Plan made per [facility] anticoagulation protocol    Maryann Gutierrez, RN  Anticoagulation Clinic  6/5/2024    _______________________________________________________________________     Anticoagulation Episode Summary       Current INR goal:  2.0-3.0   TTR:  82.3% (1 y)   Target end date:  Indefinite   Send INR reminders to:  [facility]    Indications    Long term current use of anticoagulants with INR goal of 2.0-3.0 [Z79.01]  Anti-cardiolipin antibody syndrome (H24) (Resolved) [D68.61]             Comments:               Anticoagulation Care Providers       Provider Role Specialty Phone number    Hortencia Marcus MD Referring Internal Medicine [phone]

## 2024-06-19 ENCOUNTER — ANTICOAGULATION THERAPY VISIT (OUTPATIENT)
Dept: ANTICOAGULATION | Facility: CLINIC | Age: 54
End: 2024-06-19

## 2024-06-19 ENCOUNTER — LAB (OUTPATIENT)
Dept: LAB | Facility: CLINIC | Age: 54
End: 2024-06-19
Payer: COMMERCIAL

## 2024-06-19 DIAGNOSIS — Z79.01 LONG TERM CURRENT USE OF ANTICOAGULANTS WITH INR GOAL OF 2.0-3.0: ICD-10-CM

## 2024-06-19 DIAGNOSIS — D68.61 ANTI-CARDIOLIPIN ANTIBODY SYNDROME (H): ICD-10-CM

## 2024-06-19 DIAGNOSIS — Z79.01 LONG TERM CURRENT USE OF ANTICOAGULANTS WITH INR GOAL OF 2.0-3.0: Primary | ICD-10-CM

## 2024-06-19 LAB — INR BLD: 2.7 (ref 0.9–1.1)

## 2024-06-19 PROCEDURE — 85610 PROTHROMBIN TIME: CPT

## 2024-06-19 PROCEDURE — 36416 COLLJ CAPILLARY BLOOD SPEC: CPT

## 2024-06-19 NOTE — PROGRESS NOTES
ANTICOAGULATION MANAGEMENT     Renetta Reid 53 year old female is on warfarin with therapeutic INR result. (Goal INR 2.0-3.0)    Recent labs: (last 7 days)     06/19/24  1217   INR 2.7*       ASSESSMENT     Source(s): Chart Review and Patient/Caregiver Call     Warfarin doses taken: Warfarin taken as instructed  Diet: No new diet changes identified.  Has resumed her usual diet.  Medication/supplement changes: None noted  New illness, injury, or hospitalization: No  Signs or symptoms of bleeding or clotting: No  Previous result: Subtherapeutic  Additional findings: None       PLAN     Recommended plan for no diet, medication or health factor changes affecting INR     Dosing Instructions: Continue your current warfarin dose with next INR in 3 weeks       Summary  As of 6/19/2024      Full warfarin instructions:  1.25 mg every Tue, Fri; 2.5 mg all other days   Next INR check:  7/11/2024               Telephone call with Renetta who agrees to plan and repeated back plan correctly    Check at provider office visit    Education provided:   Please call back if any changes to your diet, medications or how you've been taking warfarin    Plan made per [facility] anticoagulation protocol    Jennifer Matias RN  Anticoagulation Clinic  6/19/2024    _______________________________________________________________________     Anticoagulation Episode Summary       Current INR goal:  2.0-3.0   TTR:  80.8% (1 y)   Target end date:  Indefinite   Send INR reminders to:  [facility]    Indications    Long term current use of anticoagulants with INR goal of 2.0-3.0 [Z79.01]  Anti-cardiolipin antibody syndrome (H24) (Resolved) [D68.61]             Comments:               Anticoagulation Care Providers       Provider Role Specialty Phone number    Hortencia Marcus MD Referring Internal Medicine [phone]

## 2024-07-02 ENCOUNTER — TELEPHONE (OUTPATIENT)
Dept: INTERNAL MEDICINE | Facility: CLINIC | Age: 54
End: 2024-07-02
Payer: COMMERCIAL

## 2024-07-02 DIAGNOSIS — D68.9 COAGULOPATHY (H): ICD-10-CM

## 2024-07-02 RX ORDER — WARFARIN SODIUM 2.5 MG/1
TABLET ORAL
Qty: 30 TABLET | Refills: 0 | Status: SHIPPED | OUTPATIENT
Start: 2024-07-02 | End: 2024-07-11

## 2024-07-02 NOTE — TELEPHONE ENCOUNTER
Called patient, advised refill for warfarin was sent to pharmacy today.  Elaine Lu RN, BSN  Anticoagulation Clinic

## 2024-07-02 NOTE — TELEPHONE ENCOUNTER
Patient calls to f/up on the refill of warfarin. She will see Dr Love 7- to establish care from Dr Marcus.     Will patient need to have a vv appointment for this medication to be filled? Or can new PCP send 9 pills to pharmacy to bridge  the gap between today and when the patient is seen 7- for physical?      Pls call patient to advise     Patient can be reached at [phone]

## 2024-07-02 NOTE — TELEPHONE ENCOUNTER
ANTICOAGULATION MANAGEMENT:  Medication Refill    Anticoagulation Summary  As of 6/19/2024      Warfarin maintenance plan:  1.25 mg (2.5 mg x 0.5) every Tue, Fri; 2.5 mg (2.5 mg x 1) all other days   Next INR check:  7/11/2024   Target end date:  Indefinite    Indications    Long term current use of anticoagulants with INR goal of 2.0-3.0 [Z79.01]  Anti-cardiolipin antibody syndrome (H24) (Resolved) [D68.61]                 Anticoagulation Care Providers       Provider Role Specialty Phone number    Hortencia Marcus MD Referring Internal Medicine [phone]            Refill Criteria    Visit with referring provider/group: Overdue for referring provider visit, last visit on 7/12/22, Patient has appointment scheduled  7/11/24 to establish care with new provider    [facility] referral last signed: 08/26/2023; within last year: Yes    Lab monitoring not exceeding 2 weeks overdue: Yes    Renetta does NOT meet all criteria for refill: Visit with referring provider's group was >= 1 year ago. 30 day delano fill approved; patient notified to schedule labs per [facility] protocol, patient will establish care with new provider 7/11/24    Elaine Lu RN  Anticoagulation Clinic

## 2024-07-11 ENCOUNTER — TELEPHONE (OUTPATIENT)
Dept: FAMILY MEDICINE | Facility: CLINIC | Age: 54
End: 2024-07-11

## 2024-07-11 DIAGNOSIS — D68.9 COAGULOPATHY (H): ICD-10-CM

## 2024-07-11 RX ORDER — WARFARIN SODIUM 2.5 MG/1
TABLET ORAL
Qty: 30 TABLET | Refills: 0 | Status: SHIPPED | OUTPATIENT
Start: 2024-07-11 | End: 2024-09-12

## 2024-07-11 NOTE — TELEPHONE ENCOUNTER
Patient calls today to cancel appointment due to family emergency. Patient needs warfarin refill but can not wait until the  9-2024 appointment.     Can patient get refill now,  does she need a VV  to discuss medications or can she get a sooner preventative appointment. Pls call patient to advise     Patient can be reached at [phone]

## 2024-07-18 ENCOUNTER — MYC MEDICAL ADVICE (OUTPATIENT)
Dept: ANTICOAGULATION | Facility: CLINIC | Age: 54
End: 2024-07-18
Payer: COMMERCIAL

## 2024-07-18 DIAGNOSIS — K21.9 GASTROESOPHAGEAL REFLUX DISEASE WITHOUT ESOPHAGITIS: ICD-10-CM

## 2024-07-23 DIAGNOSIS — L50.9 URTICARIA: ICD-10-CM

## 2024-07-25 RX ORDER — HYDROXYZINE HYDROCHLORIDE 10 MG/1
20 TABLET, FILM COATED ORAL AT BEDTIME
Qty: 180 TABLET | Refills: 0 | Status: SHIPPED | OUTPATIENT
Start: 2024-07-25

## 2024-07-29 ENCOUNTER — TELEPHONE (OUTPATIENT)
Dept: ANTICOAGULATION | Facility: CLINIC | Age: 54
End: 2024-07-29
Payer: COMMERCIAL

## 2024-08-01 ENCOUNTER — TELEPHONE (OUTPATIENT)
Dept: FAMILY MEDICINE | Facility: CLINIC | Age: 54
End: 2024-08-01
Payer: COMMERCIAL

## 2024-08-01 NOTE — TELEPHONE ENCOUNTER
Patient Quality Outreach    Patient is due for the following:   Physical Preventive Adult Physical    Next Steps:   No follow up needed at this time.    Type of outreach:    None, patient has physical scheduled.    Next Steps:  Reach out within 90 days via  none .    Max number of attempts reached: Yes. Will try again in 90 days if patient still on fail list.    Questions for provider review:    None           Lopez Dumont, ANGELINE  Chart routed to none.

## 2024-08-04 ENCOUNTER — HEALTH MAINTENANCE LETTER (OUTPATIENT)
Age: 54
End: 2024-08-04

## 2024-08-05 ENCOUNTER — ANTICOAGULATION THERAPY VISIT (OUTPATIENT)
Dept: ANTICOAGULATION | Facility: CLINIC | Age: 54
End: 2024-08-05

## 2024-08-05 ENCOUNTER — LAB (OUTPATIENT)
Dept: LAB | Facility: CLINIC | Age: 54
End: 2024-08-05
Payer: COMMERCIAL

## 2024-08-05 ENCOUNTER — DOCUMENTATION ONLY (OUTPATIENT)
Dept: ANTICOAGULATION | Facility: CLINIC | Age: 54
End: 2024-08-05
Payer: COMMERCIAL

## 2024-08-05 ENCOUNTER — DOCUMENTATION ONLY (OUTPATIENT)
Dept: ANTICOAGULATION | Facility: CLINIC | Age: 54
End: 2024-08-05

## 2024-08-05 DIAGNOSIS — D68.61 ANTI-CARDIOLIPIN ANTIBODY SYNDROME (H): Primary | ICD-10-CM

## 2024-08-05 DIAGNOSIS — Z79.01 LONG TERM CURRENT USE OF ANTICOAGULANTS WITH INR GOAL OF 2.0-3.0: ICD-10-CM

## 2024-08-05 DIAGNOSIS — Z79.01 LONG TERM CURRENT USE OF ANTICOAGULANTS WITH INR GOAL OF 2.0-3.0: Primary | ICD-10-CM

## 2024-08-05 DIAGNOSIS — D68.61 ANTI-CARDIOLIPIN ANTIBODY SYNDROME (H): ICD-10-CM

## 2024-08-05 LAB — INR BLD: 2.2 (ref 0.9–1.1)

## 2024-08-05 PROCEDURE — 85610 PROTHROMBIN TIME: CPT

## 2024-08-05 PROCEDURE — 36416 COLLJ CAPILLARY BLOOD SPEC: CPT

## 2024-08-05 NOTE — PROGRESS NOTES
ANTICOAGULATION CLINIC REFERRAL RENEWAL REQUEST       An annual renewal order is required for all patients referred to [facility].?  Please review and sign the pended referral order for Renetta Reid.       ANTICOAGULATION SUMMARY      Warfarin indication(s)   Antiphospholipid Antibodies    Mechanical heart valve present?  NO       Current goal range   INR: 2.0-3.0     Goal appropriate for indication? Goal INR 2-3, standard for indication(s) above     Time in Therapeutic Range (TTR)  (Goal > 60%) 81%       Office visit with referring provider's group within last year yes on 04/25/24=virtual visit       Maryann Gutierrez RN  [facility]

## 2024-08-05 NOTE — LETTER
[facility] ANTICOAGULATION CLINIC  [address] [place] [address]  Phone: [phone]  Fax: [phone]       August 5, 2024        Renetta Reid  [address]  [place] [facility] [address]            Dear Renetta,    You are currently under the care of [facility] for your warfarin (Coumadin , Jantoven ) therapy.  We are contacting you because our records show you were due for an INR on 7/11/24.    There are potentially serious risks when taking warfarin without careful monitoring and we want to make sure you are safely managed.  Routine lab monitoring is required for warfarin refills.     Please call [phone] as soon as possible to schedule a lab appointment. If it is difficult for you to get to lab, please call us to discuss options.  If there has been a change in your care or other concerns, please let us know so we can help and/or update our records.         Sincerely,       [facility]

## 2024-08-05 NOTE — PROGRESS NOTES
ANTICOAGULATION     Renetta Reid is overdue for an INR check.     Reminder letter sent    Maryann Gutierrez RN

## 2024-08-05 NOTE — PROGRESS NOTES
ANTICOAGULATION MANAGEMENT     Renetta Reid 53 year old female is on warfarin with therapeutic INR result. (Goal INR 2.0-3.0)    Recent labs: (last 7 days)     08/05/24  1523   INR 2.2*       ASSESSMENT     Source(s): Chart Review and Patient/Caregiver Call     Warfarin doses taken: Warfarin taken as instructed  Diet: No new diet changes identified  Medication/supplement changes: None noted  New illness, injury, or hospitalization: No  Signs or symptoms of bleeding or clotting: No  Previous result: Therapeutic last visit; previously outside of goal range  Additional findings: wellness exam scheduled on 10/15/24 with Dr. Shah at [facility]       PLAN     Recommended plan for no diet, medication or health factor changes affecting INR     Dosing Instructions: Continue your current warfarin dose with next INR in 6 weeks       Summary  As of 8/5/2024      Full warfarin instructions:  1.25 mg every Tue, Fri; 2.5 mg all other days   Next INR check:  9/16/2024               Telephone call with Renetta who verbalizes understanding and agrees to plan    Lab visit scheduled    Education provided:  informed patient that reminder was mailed today, she can disregard    Plan made per [facility] anticoagulation protocol    Maryann Gutierrez, RN  Anticoagulation Clinic  8/5/2024    _______________________________________________________________________     Anticoagulation Episode Summary       Current INR goal:  2.0-3.0   TTR:  80.8% (1 y)   Target end date:  Indefinite   Send INR reminders to:  [facility]    Indications    Long term current use of anticoagulants with INR goal of 2.0-3.0 [Z79.01]  Anti-cardiolipin antibody syndrome (H24) (Resolved) [D68.61]             Comments:               Anticoagulation Care Providers       Provider Role Specialty Phone number    Hortencia Marcus MD Referring Internal Medicine [phone]

## 2024-08-06 PROBLEM — D68.61 ANTI-CARDIOLIPIN ANTIBODY SYNDROME (H): Status: ACTIVE | Noted: 2024-08-06

## 2024-08-20 DIAGNOSIS — I10 BENIGN ESSENTIAL HYPERTENSION: ICD-10-CM

## 2024-08-20 RX ORDER — AMLODIPINE BESYLATE 5 MG/1
5 TABLET ORAL DAILY
Qty: 90 TABLET | Refills: 3 | OUTPATIENT
Start: 2024-08-20

## 2024-09-06 ENCOUNTER — PATIENT OUTREACH (OUTPATIENT)
Dept: CARE COORDINATION | Facility: CLINIC | Age: 54
End: 2024-09-06
Payer: COMMERCIAL

## 2024-09-12 DIAGNOSIS — D68.9 COAGULOPATHY (H): ICD-10-CM

## 2024-09-12 RX ORDER — WARFARIN SODIUM 2.5 MG/1
TABLET ORAL
Qty: 30 TABLET | Refills: 0 | Status: SHIPPED | OUTPATIENT
Start: 2024-09-12

## 2024-09-12 NOTE — TELEPHONE ENCOUNTER
ANTICOAGULATION MANAGEMENT:  Medication Refill    Anticoagulation Summary  As of 8/5/2024      Warfarin maintenance plan:  1.25 mg (2.5 mg x 0.5) every Tue, Fri; 2.5 mg (2.5 mg x 1) all other days   Next INR check:  9/16/2024   Target end date:  Indefinite    Indications    Long term current use of anticoagulants with INR goal of 2.0-3.0 [Z79.01]  Anti-cardiolipin antibody syndrome (H24) (Resolved) [D68.61]                 Anticoagulation Care Providers       Provider Role Specialty Phone number    Hortencia Marcus MD Referring Internal Medicine [phone]    Marleni Shah MD Referring Family Medicine [phone]            Refill Criteria    Visit with referring provider/group: Overdue for referring provider visit, last annual visit on 7/12/22. However, patient has had a few VV & is scheduled for annual on 10/15/24.    [facility] referral last signed: 08/06/2024; within last year: Yes    Lab monitoring not exceeding 2 weeks overdue: Yes    Renetta does NOT meet all criteria for refill: Visit with referring provider's group was >= 2 year ago. 30 day delano fill approved; patient reminded of upcoming annual exam per [facility] protocol    Virginia Patrick RN  Anticoagulation Clinic

## 2024-09-16 ENCOUNTER — LAB (OUTPATIENT)
Dept: LAB | Facility: CLINIC | Age: 54
End: 2024-09-16
Payer: COMMERCIAL

## 2024-09-16 ENCOUNTER — ANTICOAGULATION THERAPY VISIT (OUTPATIENT)
Dept: ANTICOAGULATION | Facility: CLINIC | Age: 54
End: 2024-09-16

## 2024-09-16 DIAGNOSIS — D68.61 ANTI-CARDIOLIPIN ANTIBODY SYNDROME (H): ICD-10-CM

## 2024-09-16 DIAGNOSIS — Z79.01 LONG TERM CURRENT USE OF ANTICOAGULANTS WITH INR GOAL OF 2.0-3.0: Primary | ICD-10-CM

## 2024-09-16 LAB — INR BLD: 2.5 (ref 0.9–1.1)

## 2024-09-16 PROCEDURE — 85610 PROTHROMBIN TIME: CPT

## 2024-09-16 PROCEDURE — 36416 COLLJ CAPILLARY BLOOD SPEC: CPT

## 2024-09-16 NOTE — PROGRESS NOTES
ANTICOAGULATION MANAGEMENT     Renetta Reid 53 year old female is on warfarin with therapeutic INR result. (Goal INR 2.0-3.0)    Recent labs: (last 7 days)     09/16/24  1537   INR 2.5*       ASSESSMENT     Source(s): Chart Review  Previous INR was Therapeutic last 2(+) visits  Medication, diet, health changes since last INR chart reviewed; none identified         PLAN     Recommended plan for no diet, medication or health factor changes affecting INR     Dosing Instructions: Continue your current warfarin dose with next INR in 6 weeks       Summary  As of 9/16/2024      Full warfarin instructions:  1.25 mg every Tue, Fri; 2.5 mg all other days   Next INR check:  10/28/2024               Detailed voice message left for Renetta with dosing instructions and follow up date.     Contact [phone] to schedule and with any changes, questions or concerns.     Education provided: Please call back if any changes to your diet, medications or how you've been taking warfarin    Plan made per [facility] anticoagulation protocol    Darien Infante RN  9/16/2024  Anticoagulation Clinic  [facility] for routing messages: cadence CURRY [facility] patient phone line: [phone]        _______________________________________________________________________     Anticoagulation Episode Summary       Current INR goal:  2.0-3.0   TTR:  80.8% (1 y)   Target end date:  Indefinite   [facility]    Indications    Long term current use of anticoagulants with INR goal of 2.0-3.0 [Z79.01]  Anti-cardiolipin antibody syndrome (H24) (Resolved) [D68.61]  Anti-cardiolipin antibody syndrome (H24) [D68.61]             Comments:               Anticoagulation Care Providers       Provider Role Specialty Phone number    Hortencia Marcus MD Referring Internal Medicine [phone]    Marleni Shah MD Referring Family Medicine [phone]

## 2024-10-01 ENCOUNTER — PATIENT OUTREACH (OUTPATIENT)
Dept: CARE COORDINATION | Facility: CLINIC | Age: 54
End: 2024-10-01
Payer: COMMERCIAL

## 2024-10-13 ENCOUNTER — HEALTH MAINTENANCE LETTER (OUTPATIENT)
Age: 54
End: 2024-10-13

## 2024-10-14 SDOH — HEALTH STABILITY: PHYSICAL HEALTH: ON AVERAGE, HOW MANY MINUTES DO YOU ENGAGE IN EXERCISE AT THIS LEVEL?: 0 MIN

## 2024-10-14 SDOH — HEALTH STABILITY: PHYSICAL HEALTH: ON AVERAGE, HOW MANY DAYS PER WEEK DO YOU ENGAGE IN MODERATE TO STRENUOUS EXERCISE (LIKE A BRISK WALK)?: 0 DAYS

## 2024-10-14 ASSESSMENT — PATIENT HEALTH QUESTIONNAIRE - PHQ9
SUM OF ALL RESPONSES TO PHQ QUESTIONS 1-9: 3
SUM OF ALL RESPONSES TO PHQ QUESTIONS 1-9: 3
10. IF YOU CHECKED OFF ANY PROBLEMS, HOW DIFFICULT HAVE THESE PROBLEMS MADE IT FOR YOU TO DO YOUR WORK, TAKE CARE OF THINGS AT HOME, OR GET ALONG WITH OTHER PEOPLE: NOT DIFFICULT AT ALL

## 2024-10-14 ASSESSMENT — ANXIETY QUESTIONNAIRES
3. WORRYING TOO MUCH ABOUT DIFFERENT THINGS: NOT AT ALL
GAD7 TOTAL SCORE: 1
4. TROUBLE RELAXING: NOT AT ALL
2. NOT BEING ABLE TO STOP OR CONTROL WORRYING: NOT AT ALL
5. BEING SO RESTLESS THAT IT IS HARD TO SIT STILL: NOT AT ALL
8. IF YOU CHECKED OFF ANY PROBLEMS, HOW DIFFICULT HAVE THESE MADE IT FOR YOU TO DO YOUR WORK, TAKE CARE OF THINGS AT HOME, OR GET ALONG WITH OTHER PEOPLE?: NOT DIFFICULT AT ALL
6. BECOMING EASILY ANNOYED OR IRRITABLE: NOT AT ALL
GAD7 TOTAL SCORE: 1
7. FEELING AFRAID AS IF SOMETHING AWFUL MIGHT HAPPEN: NOT AT ALL
IF YOU CHECKED OFF ANY PROBLEMS ON THIS QUESTIONNAIRE, HOW DIFFICULT HAVE THESE PROBLEMS MADE IT FOR YOU TO DO YOUR WORK, TAKE CARE OF THINGS AT HOME, OR GET ALONG WITH OTHER PEOPLE: NOT DIFFICULT AT ALL
GAD7 TOTAL SCORE: 1
7. FEELING AFRAID AS IF SOMETHING AWFUL MIGHT HAPPEN: NOT AT ALL
1. FEELING NERVOUS, ANXIOUS, OR ON EDGE: SEVERAL DAYS

## 2024-10-14 ASSESSMENT — SOCIAL DETERMINANTS OF HEALTH (SDOH): HOW OFTEN DO YOU GET TOGETHER WITH FRIENDS OR RELATIVES?: ONCE A WEEK

## 2024-10-15 ENCOUNTER — OFFICE VISIT (OUTPATIENT)
Dept: FAMILY MEDICINE | Facility: CLINIC | Age: 54
End: 2024-10-15
Payer: COMMERCIAL

## 2024-10-15 VITALS
RESPIRATION RATE: 14 BRPM | DIASTOLIC BLOOD PRESSURE: 84 MMHG | HEART RATE: 81 BPM | WEIGHT: 265 LBS | HEIGHT: 62 IN | SYSTOLIC BLOOD PRESSURE: 138 MMHG | TEMPERATURE: 98.6 F | BODY MASS INDEX: 48.76 KG/M2 | OXYGEN SATURATION: 100 %

## 2024-10-15 DIAGNOSIS — I10 BENIGN ESSENTIAL HYPERTENSION: ICD-10-CM

## 2024-10-15 DIAGNOSIS — D64.9 ANEMIA, UNSPECIFIED TYPE: ICD-10-CM

## 2024-10-15 DIAGNOSIS — L50.9 URTICARIA: ICD-10-CM

## 2024-10-15 DIAGNOSIS — Z00.00 ROUTINE GENERAL MEDICAL EXAMINATION AT A HEALTH CARE FACILITY: Primary | ICD-10-CM

## 2024-10-15 DIAGNOSIS — D68.9 COAGULOPATHY (H): ICD-10-CM

## 2024-10-15 DIAGNOSIS — E66.01 MORBID OBESITY (H): ICD-10-CM

## 2024-10-15 DIAGNOSIS — Z12.11 SCREEN FOR COLON CANCER: ICD-10-CM

## 2024-10-15 DIAGNOSIS — D68.61 ANTI-CARDIOLIPIN ANTIBODY SYNDROME (H): ICD-10-CM

## 2024-10-15 DIAGNOSIS — K21.9 GASTROESOPHAGEAL REFLUX DISEASE WITHOUT ESOPHAGITIS: ICD-10-CM

## 2024-10-15 DIAGNOSIS — Z12.31 VISIT FOR SCREENING MAMMOGRAM: ICD-10-CM

## 2024-10-15 LAB
ALBUMIN SERPL BCG-MCNC: 4.1 G/DL (ref 3.5–5.2)
ALP SERPL-CCNC: 84 U/L (ref 40–150)
ALT SERPL W P-5'-P-CCNC: 11 U/L (ref 0–50)
ANION GAP SERPL CALCULATED.3IONS-SCNC: 13 MMOL/L (ref 7–15)
AST SERPL W P-5'-P-CCNC: 26 U/L (ref 0–45)
BILIRUB SERPL-MCNC: 0.5 MG/DL
BUN SERPL-MCNC: 14.4 MG/DL (ref 6–20)
CALCIUM SERPL-MCNC: 9.2 MG/DL (ref 8.8–10.4)
CHLORIDE SERPL-SCNC: 104 MMOL/L (ref 98–107)
CHOLEST SERPL-MCNC: 141 MG/DL
CREAT SERPL-MCNC: 0.74 MG/DL (ref 0.51–0.95)
EGFRCR SERPLBLD CKD-EPI 2021: >90 ML/MIN/1.73M2
ERYTHROCYTE [DISTWIDTH] IN BLOOD BY AUTOMATED COUNT: 21.6 % (ref 10–15)
EST. AVERAGE GLUCOSE BLD GHB EST-MCNC: 108 MG/DL
FASTING STATUS PATIENT QL REPORTED: YES
FASTING STATUS PATIENT QL REPORTED: YES
FERRITIN SERPL-MCNC: 5 NG/ML (ref 11–328)
GLUCOSE SERPL-MCNC: 94 MG/DL (ref 70–99)
HBA1C MFR BLD: 5.4 % (ref 0–5.6)
HCO3 SERPL-SCNC: 21 MMOL/L (ref 22–29)
HCT VFR BLD AUTO: 31.2 % (ref 35–47)
HDLC SERPL-MCNC: 56 MG/DL
HGB BLD-MCNC: 7.8 G/DL (ref 11.7–15.7)
IRON BINDING CAPACITY (ROCHE): 480 UG/DL (ref 240–430)
IRON SATN MFR SERPL: 3 % (ref 15–46)
IRON SERPL-MCNC: 14 UG/DL (ref 37–145)
LDLC SERPL CALC-MCNC: 71 MG/DL
MCH RBC QN AUTO: 15.1 PG (ref 26.5–33)
MCHC RBC AUTO-ENTMCNC: 25 G/DL (ref 31.5–36.5)
MCV RBC AUTO: 60 FL (ref 78–100)
NONHDLC SERPL-MCNC: 85 MG/DL
PLATELET # BLD AUTO: 327 10E3/UL (ref 150–450)
POTASSIUM SERPL-SCNC: 4.3 MMOL/L (ref 3.4–5.3)
PROT SERPL-MCNC: 8.2 G/DL (ref 6.4–8.3)
RBC # BLD AUTO: 5.18 10E6/UL (ref 3.8–5.2)
SODIUM SERPL-SCNC: 138 MMOL/L (ref 135–145)
TRIGL SERPL-MCNC: 72 MG/DL
TSH SERPL DL<=0.005 MIU/L-ACNC: 1.48 UIU/ML (ref 0.3–4.2)
WBC # BLD AUTO: 6.2 10E3/UL (ref 4–11)

## 2024-10-15 PROCEDURE — 99214 OFFICE O/P EST MOD 30 MIN: CPT | Mod: 25 | Performed by: FAMILY MEDICINE

## 2024-10-15 PROCEDURE — 99396 PREV VISIT EST AGE 40-64: CPT | Performed by: FAMILY MEDICINE

## 2024-10-15 PROCEDURE — 82728 ASSAY OF FERRITIN: CPT | Performed by: FAMILY MEDICINE

## 2024-10-15 PROCEDURE — 80061 LIPID PANEL: CPT | Performed by: FAMILY MEDICINE

## 2024-10-15 PROCEDURE — 83550 IRON BINDING TEST: CPT | Performed by: FAMILY MEDICINE

## 2024-10-15 PROCEDURE — 83036 HEMOGLOBIN GLYCOSYLATED A1C: CPT | Performed by: FAMILY MEDICINE

## 2024-10-15 PROCEDURE — 80053 COMPREHEN METABOLIC PANEL: CPT | Performed by: FAMILY MEDICINE

## 2024-10-15 PROCEDURE — 83540 ASSAY OF IRON: CPT | Performed by: FAMILY MEDICINE

## 2024-10-15 PROCEDURE — 84443 ASSAY THYROID STIM HORMONE: CPT | Performed by: FAMILY MEDICINE

## 2024-10-15 PROCEDURE — 36415 COLL VENOUS BLD VENIPUNCTURE: CPT | Performed by: FAMILY MEDICINE

## 2024-10-15 PROCEDURE — 85027 COMPLETE CBC AUTOMATED: CPT | Performed by: FAMILY MEDICINE

## 2024-10-15 RX ORDER — AMLODIPINE BESYLATE 5 MG/1
5 TABLET ORAL DAILY
Qty: 90 TABLET | Refills: 3 | Status: SHIPPED | OUTPATIENT
Start: 2024-10-15

## 2024-10-15 RX ORDER — WARFARIN SODIUM 2.5 MG/1
TABLET ORAL
Qty: 30 TABLET | Refills: 0 | Status: SHIPPED | OUTPATIENT
Start: 2024-10-15 | End: 2024-11-12

## 2024-10-15 RX ORDER — HYDROXYZINE HYDROCHLORIDE 10 MG/1
20 TABLET, FILM COATED ORAL AT BEDTIME
Qty: 180 TABLET | Refills: 3 | Status: SHIPPED | OUTPATIENT
Start: 2024-10-15

## 2024-10-15 NOTE — ADDENDUM NOTE
Addended by: ROSIBEL NORTH on: 10/15/2024 03:16 PM     Modules accepted: Orders, Level of Service

## 2024-10-15 NOTE — PROGRESS NOTES
Preventive Care Visit  [facility]  Marleni Shah MD, Family Medicine  Oct 15, 2024      Assessment & Plan     Routine general medical examination at a health care facility  - Lipid panel reflex to direct LDL Fasting; Future  - Comprehensive metabolic panel; Future  - TSH with free T4 reflex; Future  - CBC with platelets; Future  - Hemoglobin A1c; Future  - Lipid panel reflex to direct LDL Fasting  - Comprehensive metabolic panel  - TSH with free T4 reflex  - CBC with platelets  - Hemoglobin A1c    Screen for colon cancer  - Colonoscopy Screening  Referral; Future    Visit for screening mammogram  - MA Screening Bilateral w/ [place]; Future    Benign essential hypertension - upper limits of normal. She will work toward weight loss over the next 3 months, RTC for BP recheck at that time.   - amLODIPine (NORVASC) 5 MG tablet; Take 1 tablet (5 mg) by mouth daily.  - tirzepatide-Weight Management (ZEPBOUND) 2.5 MG/0.5ML prefilled pen; Inject 0.5 mLs (2.5 mg) subcutaneously every 7 days.    Urticaria - stable, refills  - hydrOXYzine HCl (ATARAX) 10 MG tablet; Take 2 tablets (20 mg) by mouth at bedtime.    Gastroesophageal reflux disease without esophagitis - stable, refills  - omeprazole (PRILOSEC) 20 MG DR capsule; Take 1 capsule (20 mg) by mouth daily.  - tirzepatide-Weight Management (ZEPBOUND) 2.5 MG/0.5ML prefilled pen; Inject 0.5 mLs (2.5 mg) subcutaneously every 7 days.    Coagulopathy (H)  - warfarin ANTICOAGULANT (COUMADIN) 2.5 MG tablet; TAKE 1/2 TAB ON TUESDAY & FRIDAY + 1 TAB ALL OTHER DAYS OR AS DIRECTED BY CLINIC. DUE FOR ANNUAL EXAM (10/15/24) FOR ADDITIONAL REFILLS.    Anti-cardiolipin antibody syndrome (H)    Morbid obesity (H) - discussed weight loss medications. She would like to try GLP1, which seems reasonable. Reviewed potential side effects.   - tirzepatide-Weight Management (ZEPBOUND) 2.5 MG/0.5ML prefilled pen; Inject 0.5 mLs (2.5 mg) subcutaneously every 7  "days.    10. Anemia, unspecified type - unexpected as this is not similar to previous. Commented on light flow period with one heavy day. Did not comment on hematochezia or melena. Advised colonoscopy, start iron supplement, follow up in 2-3 weeks for repeat labs.   - Iron and iron binding capacity; Future  - Ferritin; Future  - CBC with platelets; Future    BMI  Estimated body mass index is 49.26 kg/m  as calculated from the following:    Height as of this encounter: 1.562 m (5' 1.5\").    Weight as of this encounter: 120.2 kg (265 lb).       Counseling  Appropriate preventive services were addressed with this patient via screening, questionnaire, or discussion as appropriate for fall prevention, nutrition, physical activity, Tobacco-use cessation, social engagement, weight loss and cognition.  Checklist reviewing preventive services available has been given to the patient.  Reviewed patient's diet, addressing concerns and/or questions.   She is at risk for psychosocial distress and has been provided with information to reduce risk.   The patient reports drinking more than 3 alcoholic drinks per day and/or more than 7 drhnks per week. The patient was counseled and given information about possible harmful effects of excessive alcohol intake.    Hemant Jackson is a 54 year old, presenting for the following:  Physical        10/15/2024     9:14 AM   Additional Questions   Roomed by Lopez Dumont   Accompanied by self        Health Care Directive  Patient does not have a Health Care Directive or Living Will: Discussed advance care planning with patient; information given to patient to review.    HPI      10/14/2024   General Health   How would you rate your overall physical health? (!) POOR   Feel stress (tense, anxious, or unable to sleep) Only a little      (!) STRESS CONCERN      10/14/2024   Nutrition   Three or more servings of calcium each day? (!) NO   Diet: Regular (no restrictions)   How many servings of " fruit and vegetables per day? (!) 0-1   How many sweetened beverages each day? 0-1            10/14/2024   Exercise   Days per week of moderate/strenous exercise 0 days   Average minutes spent exercising at this level 0 min      (!) EXERCISE CONCERN      10/14/2024   Social Factors   Frequency of gathering with friends or relatives Once a week   Worry food won't last until get money to buy more No   Food not last or not have enough money for food? No   Do you have housing? (Housing is defined as stable permanent housing and does not include staying ouside in a car, in a tent, in an abandoned building, in an overnight shelter, or couch-surfing.) Yes   Are you worried about losing your housing? No   Lack of transportation? No   Unable to get utilities (heat,electricity)? No            10/14/2024   Fall Risk   Fallen 2 or more times in the past year? No   Trouble with walking or balance? No             10/14/2024   Dental   Dentist two times every year? Yes            10/14/2024   TB Screening   Were you born outside of the [place]? No          Today's PHQ-9 Score:       10/14/2024     5:34 PM   PHQ-9 SCORE   PHQ-9 Total Score MyChart 3 (Minimal depression)   PHQ-9 Total Score 3         10/14/2024   Substance Use   Alcohol more than 3/day or more than 7/wk Yes   How often do you have a drink containing alcohol 2 to 3 times a week   How many alcohol drinks on typical day 3 or 4   How often do you have 5+ drinks at one occasion Less than monthly   Audit 2/3 Score 2   How often not able to stop drinking once started Never   How often failed to do what normally expected Never   How often needed first drink in am after a heavy drinking session Never   How often feeling of guilt or remorse after drinking Never   How often unable to remember what happened the night before Never   Have you or someone else been injured because of your drinking No   Has anyone been concerned or suggested you cut down on drinking No   TOTAL SCORE -  AUDIT 5   Do you use any other substances recreationally? No        Social History     Tobacco Use    Smoking status: Never     Passive exposure: Never    Smokeless tobacco: Never   Vaping Use    Vaping status: Never Used   Substance Use Topics    Alcohol use: Yes     Comment: socially    Drug use: No           4/4/2022   LAST FHS-7 RESULTS   1st degree relative breast or ovarian cancer No   Any relative bilateral breast cancer No   Any male have breast cancer No   Any ONE woman have BOTH breast AND ovarian cancer No   Any woman with breast cancer before 50yrs No   2 or more relatives with breast AND/OR ovarian cancer No   2 or more relatives with breast AND/OR bowel cancer No           Mammogram Screening - Mammogram every 1-2 years updated in Health Maintenance based on mutual decision making        10/14/2024   STI Screening   New sexual partner(s) since last STI/HIV test? No        History of abnormal Pap smear: No - age 30- 64 PAP with HPV every 5 years recommended        Latest Ref Rng & Units 7/12/2022    10:07 AM 2/7/2017     9:53 AM 2/7/2017     9:35 AM   PAP / HPV   PAP  Negative for Intraepithelial Lesion or Malignancy (NILM)      PAP (Historical)   NIL     HPV 16 DNA Negative Negative   Negative    HPV 18 DNA Negative Negative   Negative    Other HR HPV Negative Negative   Negative      ASCVD Risk   The 10-year ASCVD risk score (Radha GALVAN, et al., 2019) is: 2.2%    Values used to calculate the score:      Age: 54 years      Sex: Female      Is Non- : No      Diabetic: No      Tobacco smoker: No      Systolic Blood Pressure: 138 mmHg      Is BP treated: Yes      HDL Cholesterol: 61 mg/dL      Total Cholesterol: 179 mg/dL         Reviewed and updated as needed this visit by Provider                    Patient Active Problem List   Diagnosis    Calculus of kidney    Gastroesophageal reflux disease without esophagitis    Long term current use of anticoagulants with INR goal  "of 2.0-3.0    Morbid obesity (H)    Anxiety    Benign essential hypertension    Urticaria    History of Henoch-Schonlein purpura    Anti-cardiolipin antibody syndrome (H)     No past surgical history on file.    Social History     Tobacco Use    Smoking status: Never     Passive exposure: Never    Smokeless tobacco: Never   Substance Use Topics    Alcohol use: Yes     Comment: socially     Family History   Problem Relation Age of Onset    Alzheimer Disease Mother     Hypertension Mother     Thyroid Disease Mother     Cerebrovascular Disease Father     C.A.D. Father     Hypertension Father     Lipids Father     Prostate Cancer Father     Blood Disease Father         Factor V Leiden with PE             Review of Systems  Constitutional, neuro, ENT, endocrine, pulmonary, cardiac, gastrointestinal, genitourinary, musculoskeletal, integument and psychiatric systems are negative, except as otherwise noted.     Objective    Exam  /84 (BP Location: Right arm, Patient Position: Chair, Cuff Size: Adult Large)   Pulse 81   Temp 98.6  F (37  C) (Oral)   Resp 14   Ht 1.562 m (5' 1.5\")   Wt 120.2 kg (265 lb)   LMP 09/12/2024   SpO2 100%   Breastfeeding No   BMI 49.26 kg/m     Estimated body mass index is 49.26 kg/m  as calculated from the following:    Height as of this encounter: 1.562 m (5' 1.5\").    Weight as of this encounter: 120.2 kg (265 lb).    Physical Exam  GENERAL: alert and no distress  EYES: Eyes grossly normal to inspection, PERRL and conjunctivae and sclerae normal  HENT: ear canals and TM's normal, nose and mouth without ulcers or lesions  NECK: no adenopathy, no asymmetry, masses, or scars  RESP: lungs clear to auscultation - no rales, rhonchi or wheezes  BREAST: normal without masses, tenderness or nipple discharge and no palpable axillary masses or adenopathy  CV: regular rate and rhythm, normal S1 S2, no S3 or S4, no murmur, click or rub, no peripheral edema  ABDOMEN: soft, nontender, no " hepatosplenomegaly, no masses and bowel sounds normal  MS: no gross musculoskeletal defects noted, no edema  SKIN: no suspicious lesions or rashes  NEURO: Normal strength and tone, mentation intact and speech normal  PSYCH: mentation appears normal, affect normal/bright        Signed Electronically by: Marleni Shah MD    Answers submitted by the patient for this visit:  Patient Health Questionnaire (Submitted on 10/14/2024)  If you checked off any problems, how difficult have these problems made it for you to do your work, take care of things at home, or get along with other people?: Not difficult at all  PHQ9 TOTAL SCORE: 3  Patient Health Questionnaire (G7) (Submitted on 10/14/2024)  AV 7 TOTAL SCORE: 1

## 2024-10-15 NOTE — PATIENT INSTRUCTIONS
Patient Education   Preventive Care Advice   This is general advice given by our system to help you stay healthy. However, your care team may have specific advice just for you. Please talk to your care team about your preventive care needs.  Nutrition  Eat 5 or more servings of fruits and vegetables each day.  Try wheat bread, brown rice and whole grain pasta (instead of white bread, rice, and pasta).  Get enough calcium and vitamin D. Check the label on foods and aim for 100% of the RDA (recommended daily allowance).  Lifestyle  Exercise at least 150 minutes each week  (30 minutes a day, 5 days a week).  Do muscle strengthening activities 2 days a week. These help control your weight and prevent disease.  No smoking.  Wear sunscreen to prevent skin cancer.  Have a dental exam and cleaning every 6 months.  Yearly exams  See your health care team every year to talk about:  Any changes in your health.  Any medicines your care team has prescribed.  Preventive care, family planning, and ways to prevent chronic diseases.  Shots (vaccines)   HPV shots (up to age 26), if you've never had them before.  Hepatitis B shots (up to age 59), if you've never had them before.  COVID-19 shot: Get this shot when it's due.  Flu shot: Get a flu shot every year.  Tetanus shot: Get a tetanus shot every 10 years.  Pneumococcal, hepatitis A, and RSV shots: Ask your care team if you need these based on your risk.  Shingles shot (for age 50 and up)  General health tests  Diabetes screening:  Starting at age 35, Get screened for diabetes at least every 3 years.  If you are younger than age 35, ask your care team if you should be screened for diabetes.  Cholesterol test: At age 39, start having a cholesterol test every 5 years, or more often if advised.  Bone density scan (DEXA): At age 50, ask your care team if you should have this scan for osteoporosis (brittle bones).  Hepatitis C: Get tested at least once in your life.  STIs (sexually  transmitted infections)  Before age 24: Ask your care team if you should be screened for STIs.  After age 24: Get screened for STIs if you're at risk. You are at risk for STIs (including HIV) if:  You are sexually active with more than one person.  You don't use condoms every time.  You or a partner was diagnosed with a sexually transmitted infection.  If you are at risk for HIV, ask about PrEP medicine to prevent HIV.  Get tested for HIV at least once in your life, whether you are at risk for HIV or not.  Cancer screening tests  Cervical cancer screening: If you have a cervix, begin getting regular cervical cancer screening tests starting at age 21.  Breast cancer scan (mammogram): If you've ever had breasts, begin having regular mammograms starting at age 40. This is a scan to check for breast cancer.  Colon cancer screening: It is important to start screening for colon cancer at age 45.  Have a colonoscopy test every 10 years (or more often if you're at risk) Or, ask your provider about stool tests like a FIT test every year or Cologuard test every 3 years.  To learn more about your testing options, visit:   .  For help making a decision, visit:   https://bit.ly/rj73096.  Prostate cancer screening test: If you have a prostate, ask your care team if a prostate cancer screening test (PSA) at age 55 is right for you.  Lung cancer screening: If you are a current or former smoker ages 50 to 80, ask your care team if ongoing lung cancer screenings are right for you.  For informational purposes only. Not to replace the advice of your health care provider. Copyright   2023 [facility]. All rights reserved. Clinically reviewed by the [facility] Transitions Program. [employer] [id number] - REV 01/24.  9 Ways to Cut Back on Drinking  Maybe you've found yourself drinking more alcohol than you'd prefer. If you want to cut back, here are some ideas to try.    Think before you drink.  Do you really want a drink,  "or is it just a habit? If you're used to having a drink at a certain time, try doing something else then.     Look for substitutes.  Find some no-alcohol drinks that you enjoy, like flavored seltzer water, tea with honey, or tonic with a slice of lime. Or try alcohol-free beer or \"virgin\" cocktails (without the alcohol).     Drink more water.  Use water to quench your thirst. Drink a glass of water before you have any alcohol. Have another glass along with every drink or between drinks.     Shrink your drink.  For example, have a bottle of beer instead of a pint. Use a smaller glass for wine. Choose drinks with lower alcohol content (ABV%). Or use less liquor and more mixer in cocktails.     Slow down.  It's easy to drink quickly and without thinking about it. Pay attention, and make each drink last longer.     Do the math.  Total up how much you spend on alcohol each month. How much is that a year? If you cut back, what could you do with the money you save?     Take a break.  Choose a day or two each week when you won't drink at all. Notice how you feel on those days, physically and emotionally. How did you sleep? Do you feel better? Over time, add more break days.     Count calories.  Would you like to lose some weight? For some people that's a good motivator for cutting back. Figure out how many calories are in each drink. How many does that add up to in a day? In a week? In a month?     Practice saying no.  Be ready when someone offers you a drink. Try: \"Thanks, I've had enough.\" Or \"Thanks, but I'm cutting back.\" Or \"No, thanks. I feel better when I drink less.\"   Current as of: November 15, 2023  Content Version: 14.2 2024 [employer].   Care instructions adapted under license by your healthcare professional. If you have questions about a medical condition or this instruction, always ask your healthcare professional. [employer] disclaims any warranty or liability for your use of " this information.     Patient Education   Preventive Care Advice   This is general advice given by our system to help you stay healthy. However, your care team may have specific advice just for you. Please talk to your care team about your preventive care needs.  Nutrition  Eat 5 or more servings of fruits and vegetables each day.  Try wheat bread, brown rice and whole grain pasta (instead of white bread, rice, and pasta).  Get enough calcium and vitamin D. Check the label on foods and aim for 100% of the RDA (recommended daily allowance).  Lifestyle  Exercise at least 150 minutes each week  (30 minutes a day, 5 days a week).  Do muscle strengthening activities 2 days a week. These help control your weight and prevent disease.  No smoking.  Wear sunscreen to prevent skin cancer.  Have a dental exam and cleaning every 6 months.  Yearly exams  See your health care team every year to talk about:  Any changes in your health.  Any medicines your care team has prescribed.  Preventive care, family planning, and ways to prevent chronic diseases.  Shots (vaccines)   HPV shots (up to age 26), if you've never had them before.  Hepatitis B shots (up to age 59), if you've never had them before.  COVID-19 shot: Get this shot when it's due.  Flu shot: Get a flu shot every year.  Tetanus shot: Get a tetanus shot every 10 years.  Pneumococcal, hepatitis A, and RSV shots: Ask your care team if you need these based on your risk.  Shingles shot (for age 50 and up)  General health tests  Diabetes screening:  Starting at age 35, Get screened for diabetes at least every 3 years.  If you are younger than age 35, ask your care team if you should be screened for diabetes.  Cholesterol test: At age 39, start having a cholesterol test every 5 years, or more often if advised.  Bone density scan (DEXA): At age 50, ask your care team if you should have this scan for osteoporosis (brittle bones).  Hepatitis C: Get tested at least once in your  life.  STIs (sexually transmitted infections)  Before age 24: Ask your care team if you should be screened for STIs.  After age 24: Get screened for STIs if you're at risk. You are at risk for STIs (including HIV) if:  You are sexually active with more than one person.  You don't use condoms every time.  You or a partner was diagnosed with a sexually transmitted infection.  If you are at risk for HIV, ask about PrEP medicine to prevent HIV.  Get tested for HIV at least once in your life, whether you are at risk for HIV or not.  Cancer screening tests  Cervical cancer screening: If you have a cervix, begin getting regular cervical cancer screening tests starting at age 21.  Breast cancer scan (mammogram): If you've ever had breasts, begin having regular mammograms starting at age 40. This is a scan to check for breast cancer.  Colon cancer screening: It is important to start screening for colon cancer at age 45.  Have a colonoscopy test every 10 years (or more often if you're at risk) Or, ask your provider about stool tests like a FIT test every year or Cologuard test every 3 years.  To learn more about your testing options, visit:   .  For help making a decision, visit:   https://bit.ly/ua31043.  Prostate cancer screening test: If you have a prostate, ask your care team if a prostate cancer screening test (PSA) at age 55 is right for you.  Lung cancer screening: If you are a current or former smoker ages 50 to 80, ask your care team if ongoing lung cancer screenings are right for you.  For informational purposes only. Not to replace the advice of your health care provider. Copyright   2023 [facility]. All rights reserved. Clinically reviewed by the [facility] Transitions Program. [employer] [id number] - REV 01/24.  9 Ways to Cut Back on Drinking  Maybe you've found yourself drinking more alcohol than you'd prefer. If you want to cut back, here are some ideas to try.    Think before you drink.  Do  "you really want a drink, or is it just a habit? If you're used to having a drink at a certain time, try doing something else then.     Look for substitutes.  Find some no-alcohol drinks that you enjoy, like flavored seltzer water, tea with honey, or tonic with a slice of lime. Or try alcohol-free beer or \"virgin\" cocktails (without the alcohol).     Drink more water.  Use water to quench your thirst. Drink a glass of water before you have any alcohol. Have another glass along with every drink or between drinks.     Shrink your drink.  For example, have a bottle of beer instead of a pint. Use a smaller glass for wine. Choose drinks with lower alcohol content (ABV%). Or use less liquor and more mixer in cocktails.     Slow down.  It's easy to drink quickly and without thinking about it. Pay attention, and make each drink last longer.     Do the math.  Total up how much you spend on alcohol each month. How much is that a year? If you cut back, what could you do with the money you save?     Take a break.  Choose a day or two each week when you won't drink at all. Notice how you feel on those days, physically and emotionally. How did you sleep? Do you feel better? Over time, add more break days.     Count calories.  Would you like to lose some weight? For some people that's a good motivator for cutting back. Figure out how many calories are in each drink. How many does that add up to in a day? In a week? In a month?     Practice saying no.  Be ready when someone offers you a drink. Try: \"Thanks, I've had enough.\" Or \"Thanks, but I'm cutting back.\" Or \"No, thanks. I feel better when I drink less.\"   Current as of: November 15, 2023  Content Version: 14.2 2024 [employer].   Care instructions adapted under license by your healthcare professional. If you have questions about a medical condition or this instruction, always ask your healthcare professional. [employer] disclaims any warranty or " liability for your use of this information.

## 2024-10-16 ENCOUNTER — PATIENT OUTREACH (OUTPATIENT)
Dept: CARE COORDINATION | Facility: CLINIC | Age: 54
End: 2024-10-16
Payer: COMMERCIAL

## 2024-10-16 NOTE — RESULT ENCOUNTER NOTE
David Jackson,   I am helping cover for Dr. Shah while she is out. The rest of your labs are as expected. Your iron is low. Start the ferrous gluconate supplement as she suggested. You can take this with orange juice to help it absorb better. Have your lab rechecked 2-3 weeks after you start the supplement.   Your thyroid lab was normal.   Your cholesterol is great!  Your hemoglobin A1C is in the normal range, which means your average blood sugar is also normal.   Feel free to reach out with questions.  COLUMBA Frazier CNP

## 2024-10-30 ENCOUNTER — ANTICOAGULATION THERAPY VISIT (OUTPATIENT)
Dept: ANTICOAGULATION | Facility: CLINIC | Age: 54
End: 2024-10-30

## 2024-10-30 ENCOUNTER — LAB (OUTPATIENT)
Dept: LAB | Facility: CLINIC | Age: 54
End: 2024-10-30
Payer: COMMERCIAL

## 2024-10-30 DIAGNOSIS — D68.61 ANTI-CARDIOLIPIN ANTIBODY SYNDROME (H): ICD-10-CM

## 2024-10-30 DIAGNOSIS — Z79.01 LONG TERM CURRENT USE OF ANTICOAGULANTS WITH INR GOAL OF 2.0-3.0: Primary | ICD-10-CM

## 2024-10-30 LAB — INR BLD: 2.3 (ref 0.9–1.1)

## 2024-10-30 PROCEDURE — 36416 COLLJ CAPILLARY BLOOD SPEC: CPT

## 2024-10-30 PROCEDURE — 85610 PROTHROMBIN TIME: CPT

## 2024-10-30 NOTE — PROGRESS NOTES
ANTICOAGULATION MANAGEMENT     Renetta Reid 54 year old female is on warfarin with therapeutic INR result. (Goal INR 2.0-3.0)    Recent labs: (last 7 days)     10/30/24  1512   INR 2.3*       ASSESSMENT     Source(s): Chart Review  Previous INR was Therapeutic last 2(+) visits  Medication, diet, health changes since last INR chart reviewed;   Appears patient started Zepbound 10/15/24, per UpToDate: Tirzepatide may increase the serum concentration of Warfarin. Tirzepatide may decrease the serum concentration of Warfarin. Severity Moderate Reliability Rating Fair   Ferrous gluconate started 10/15/24, no interaction with warfarin anticipated         PLAN     Recommended plan for ongoing change(s) affecting INR     Dosing Instructions: Continue your current warfarin dose with next INR in 6 weeks       Summary  As of 10/30/2024      Full warfarin instructions:  1.25 mg every Tue, Fri; 2.5 mg all other days   Next INR check:  12/11/2024               Detailed voice message left for Renetta with dosing instructions and follow up date.     Contact [phone] to schedule and with any changes, questions or concerns.     Education provided: Please call back if any changes to your diet, medications or how you've been taking warfarin  Contact [phone] with any changes, questions or concerns.     Plan made per 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

## 2024-11-12 ENCOUNTER — LAB (OUTPATIENT)
Dept: LAB | Facility: CLINIC | Age: 54
End: 2024-11-12
Payer: COMMERCIAL

## 2024-11-12 DIAGNOSIS — D64.9 ANEMIA, UNSPECIFIED TYPE: ICD-10-CM

## 2024-11-12 DIAGNOSIS — D68.9 COAGULOPATHY (H): ICD-10-CM

## 2024-11-12 LAB
ELLIPTOCYTES BLD QL SMEAR: SLIGHT
ERYTHROCYTE [DISTWIDTH] IN BLOOD BY AUTOMATED COUNT: ABNORMAL %
HCT VFR BLD AUTO: 41 % (ref 35–47)
HGB BLD-MCNC: 11 G/DL (ref 11.7–15.7)
MCH RBC QN AUTO: 19.1 PG (ref 26.5–33)
MCHC RBC AUTO-ENTMCNC: 26.8 G/DL (ref 31.5–36.5)
MCV RBC AUTO: 71 FL (ref 78–100)
PLAT MORPH BLD: ABNORMAL
PLATELET # BLD AUTO: 234 10E3/UL (ref 150–450)
RBC # BLD AUTO: 5.75 10E6/UL (ref 3.8–5.2)
RBC MORPH BLD: ABNORMAL
WBC # BLD AUTO: 5.8 10E3/UL (ref 4–11)

## 2024-11-12 PROCEDURE — 36415 COLL VENOUS BLD VENIPUNCTURE: CPT

## 2024-11-12 PROCEDURE — 85027 COMPLETE CBC AUTOMATED: CPT

## 2024-11-12 RX ORDER — WARFARIN SODIUM 2.5 MG/1
TABLET ORAL
Qty: 90 TABLET | Refills: 1 | Status: SHIPPED | OUTPATIENT
Start: 2024-11-12

## 2024-11-12 NOTE — TELEPHONE ENCOUNTER
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 Rx instructions and quantity match patient's current dosing plan. Warfarin 90 day supply with 1 refill granted per [facility] protocol     Jennifer Matias RN  Anticoagulation Clinic

## 2024-11-14 DIAGNOSIS — D50.0 IRON DEFICIENCY ANEMIA DUE TO CHRONIC BLOOD LOSS: Primary | ICD-10-CM

## 2024-11-15 ENCOUNTER — TELEPHONE (OUTPATIENT)
Dept: FAMILY MEDICINE | Facility: CLINIC | Age: 54
End: 2024-11-15
Payer: COMMERCIAL

## 2024-11-15 NOTE — TELEPHONE ENCOUNTER
Patient Quality Outreach    Patient is due for the following:   Breast Cancer Screening - Mammogram    Action(s) Taken:   Sent my chart reminder. Patient had recent physical done     Type of outreach:    Sent [employer] message.    Questions for provider review:    None           Lopez Dumont CMA  Chart routed to none.

## 2024-11-25 ENCOUNTER — OFFICE VISIT (OUTPATIENT)
Dept: URGENT CARE | Facility: URGENT CARE | Age: 54
End: 2024-11-25
Payer: COMMERCIAL

## 2024-11-25 VITALS
SYSTOLIC BLOOD PRESSURE: 128 MMHG | WEIGHT: 265 LBS | OXYGEN SATURATION: 97 % | DIASTOLIC BLOOD PRESSURE: 90 MMHG | BODY MASS INDEX: 49.26 KG/M2 | HEART RATE: 84 BPM | TEMPERATURE: 98.1 F | RESPIRATION RATE: 20 BRPM

## 2024-11-25 DIAGNOSIS — J01.90 ACUTE SINUSITIS WITH SYMPTOMS > 10 DAYS: Primary | ICD-10-CM

## 2024-11-25 PROCEDURE — 99213 OFFICE O/P EST LOW 20 MIN: CPT | Performed by: PHYSICIAN ASSISTANT

## 2024-11-25 NOTE — PROGRESS NOTES
URGENT CARE VISIT:    SUBJECTIVE:   Renetta Reid is a 54 year old female presenting with a chief complaint of stuffy nose, sinus pressure, and ears plugged.  Onset was 1 week(s) ago.   She denies the following symptoms: shortness of breath  Course of illness is same.    Treatment measures tried include Mucinex with no relief of symptoms.  Predisposing factors include None.    PMH:   Past Medical History:   Diagnosis Date    Calculus of kidney 97.2000 &2003    Stones occurred with pregnancies.    Coagulopathy (H) 8/10    Lupus inhibitor, antcardiolipin antibody    Diverticulitis of colon (without mention of hemorrhage)(562.11) 10/00    DVT (deep venous thrombosis) (H) 8/10    right leg    Esophageal reflux     Family history of coronary artery disease     Brother 40    HSP (Henoch Schonlein purpura) (H) 11/10    with Vasculitis    Normal delivery 1997, 2000    Pulmonary emboli (H) 8/10    Rosacea     Urticaria, unspecified     episodic     Allergies: Seasonal allergies   Medications:   Current Outpatient Medications   Medication Sig Dispense Refill    amLODIPine (NORVASC) 5 MG tablet Take 1 tablet (5 mg) by mouth daily. 90 tablet 3    amoxicillin-clavulanate (AUGMENTIN) 875-125 MG tablet Take 1 tablet by mouth 2 times daily for 7 days. 14 tablet 0    hydrOXYzine HCl (ATARAX) 10 MG tablet Take 2 tablets (20 mg) by mouth at bedtime. 180 tablet 3    Iron Combinations (IRON COMPLEX PO) Take by mouth.      omeprazole (PRILOSEC) 20 MG DR capsule Take 1 capsule (20 mg) by mouth daily. 90 capsule 3    warfarin ANTICOAGULANT (COUMADIN) 2.5 MG tablet TAKE 1/2 TAB ON TUESDAY & FRIDAY + 1 TAB ALL OTHER DAYS OR AS DIRECTED BY CLINIC. 90 tablet 1     Social History:   Social History     Tobacco Use    Smoking status: Never     Passive exposure: Never    Smokeless tobacco: Never   Substance Use Topics    Alcohol use: Yes     Comment: socially       ROS:  Review of systems negative except as stated above.    OBJECTIVE:  BP (!)  128/90   Pulse 84   Temp 98.1  F (36.7  C)   Resp 20   Wt 120.2 kg (265 lb)   LMP 09/12/2024   SpO2 97%   BMI 49.26 kg/m    GENERAL APPEARANCE: healthy, alert and no distress  EYES: EOMI,  PERRL, conjunctiva clear  HENT: ear canals and TM's normal.  Nose and mouth without ulcers, erythema or lesions  NECK: supple, nontender, no lymphadenopathy  RESP: lungs clear to auscultation - no rales, rhonchi or wheezes  CV: regular rates and rhythm, normal S1 S2, no murmur noted  SKIN: no suspicious lesions or rashes      ASSESSMENT:    ICD-10-CM    1. Acute sinusitis with symptoms > 10 days  J01.90 amoxicillin-clavulanate (AUGMENTIN) 875-125 MG tablet          PLAN:  Patient Instructions   Patient was educated on the natural course of condition.  Take medications as prescribed. Side effects may include stomachache or diarrhea. Conservative measures discussed including increased fluids, nasal saline irrigation (neti pot), warm steamy shower, cough suppressants, expectorants (Mucinex), and analgesics (Tylenol and/or Ibuprofen). See your primary care provider if symptoms worsen or do not improve in 7 days. Seek emergency care if you develop fever over 103 or shortness of breath.    Patient verbalized understanding and is agreeable to plan. The patient was discharged ambulatory and in stable condition.    Beth Brooke PA-C ....................  11/25/2024   4:54 PM

## 2024-12-16 ENCOUNTER — TELEPHONE (OUTPATIENT)
Dept: FAMILY MEDICINE | Facility: CLINIC | Age: 54
End: 2024-12-16
Payer: COMMERCIAL

## 2024-12-16 NOTE — TELEPHONE ENCOUNTER
Patient calling stating she picked up the - tirzepatide-Weight Management (ZEPBOUND) 2.5 MG/0.5ML prefilled pen; Inject 0.5 mLs (2.5 mg) subcutaneously every 7 days.     She was not planning on starting this until after vacation. She was calling in for a new prescription as she has already picked up the other prescription back in October and was not told to keep it in the fridge. She will call insurance first as a new refill may not be covered, could come back as refill too soon, and also they should know she has not yet started med. She will call back once she knows how to proceed and for new prescription.    Indy Ordonez,

## 2024-12-18 ENCOUNTER — TELEPHONE (OUTPATIENT)
Dept: ANTICOAGULATION | Facility: CLINIC | Age: 54
End: 2024-12-18
Payer: COMMERCIAL

## 2024-12-18 NOTE — TELEPHONE ENCOUNTER
ANTICOAGULATION     Renetta Reid is overdue for an INR check.     Left message for patient to call and schedule lab appointment as soon as possible. If returning call, please schedule.     Maryann Gutierrez RN  12/18/2024  Anticoagulation Clinic  [facility] for routing messages: cadence GAN  [facility] patient phone line: [phone]

## 2024-12-26 ENCOUNTER — MYC MEDICAL ADVICE (OUTPATIENT)
Dept: ANTICOAGULATION | Facility: CLINIC | Age: 54
End: 2024-12-26
Payer: COMMERCIAL

## 2025-01-02 ENCOUNTER — LAB (OUTPATIENT)
Dept: LAB | Facility: CLINIC | Age: 55
End: 2025-01-02
Payer: COMMERCIAL

## 2025-01-02 ENCOUNTER — ANTICOAGULATION THERAPY VISIT (OUTPATIENT)
Dept: ANTICOAGULATION | Facility: CLINIC | Age: 55
End: 2025-01-02

## 2025-01-02 DIAGNOSIS — D68.61 ANTI-CARDIOLIPIN ANTIBODY SYNDROME (H): ICD-10-CM

## 2025-01-02 DIAGNOSIS — Z79.01 LONG TERM CURRENT USE OF ANTICOAGULANTS WITH INR GOAL OF 2.0-3.0: Primary | ICD-10-CM

## 2025-01-02 LAB — INR BLD: 4.2 (ref 0.9–1.1)

## 2025-01-02 NOTE — PROGRESS NOTES
ANTICOAGULATION MANAGEMENT     Renetta Reid 54 year old female is on warfarin with supratherapeutic INR result. (Goal INR 2.0-3.0)    Recent labs: (last 7 days)     01/02/25  1604   INR 4.2*       ASSESSMENT     Source(s): Chart Review and Patient/Caregiver Call     Warfarin doses taken: Warfarin taken as instructed  Diet: Decreased greens/vitamin K in diet; plans to resume previous intake and Change in alcohol intake may be affecting INR. Increase alcohol over the holidays   Medication/supplement changes: None noted  New illness, injury, or hospitalization: No  Signs or symptoms of bleeding or clotting: No  Previous result: Therapeutic last 2(+) visits  Additional findings: None       PLAN     Recommended plan for temporary change(s) affecting INR     Dosing Instructions: hold dose then continue your current warfarin dose with next INR in 10 days     Summary  As of 1/2/2025      Full warfarin instructions:  1/2: Hold; Otherwise 1.25 mg every Tue, Fri; 2.5 mg all other days   Next INR check:  1/13/2025               Telephone call with Renetta who verbalizes understanding and agrees to plan    Lab visit scheduled    Education provided: Please call back if any changes to your diet, medications or how you've been taking warfarin  Symptom monitoring: monitoring for bleeding signs and symptoms and monitoring for clotting signs and symptoms    Plan made per [facility] anticoagulation protocol    Virginia Patrick RN  1/2/2025  Anticoagulation Clinic  [facility] for routing messages: cadence GAN  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

## 2025-01-13 ENCOUNTER — ANTICOAGULATION THERAPY VISIT (OUTPATIENT)
Dept: ANTICOAGULATION | Facility: CLINIC | Age: 55
End: 2025-01-13

## 2025-01-13 ENCOUNTER — LAB (OUTPATIENT)
Dept: LAB | Facility: CLINIC | Age: 55
End: 2025-01-13
Payer: COMMERCIAL

## 2025-01-13 DIAGNOSIS — Z79.01 LONG TERM CURRENT USE OF ANTICOAGULANTS WITH INR GOAL OF 2.0-3.0: Primary | ICD-10-CM

## 2025-01-13 DIAGNOSIS — D68.61 ANTI-CARDIOLIPIN ANTIBODY SYNDROME: ICD-10-CM

## 2025-01-13 LAB — INR BLD: 2 (ref 0.9–1.1)

## 2025-01-13 PROCEDURE — 36416 COLLJ CAPILLARY BLOOD SPEC: CPT

## 2025-01-13 PROCEDURE — 85610 PROTHROMBIN TIME: CPT

## 2025-01-13 NOTE — PROGRESS NOTES
ANTICOAGULATION MANAGEMENT     Renetta Reid 54 year old female is on warfarin with therapeutic INR result. (Goal INR 2.0-3.0)    Recent labs: (last 7 days)     01/13/25  1606   INR 2.0*       ASSESSMENT     Source(s): Chart Review and Patient/Caregiver Call     Warfarin doses taken: Warfarin taken as instructed  Diet: No new diet changes identified  Medication/supplement changes: None noted  New illness, injury, or hospitalization: No  Signs or symptoms of bleeding or clotting: No  Previous result: Supratherapeutic  Additional findings: None       PLAN     Recommended plan for no diet, medication or health factor changes affecting INR     Dosing Instructions: Continue your current warfarin dose with next INR in 3 weeks       Summary  As of 1/13/2025      Full warfarin instructions:  1.25 mg every Tue, Fri; 2.5 mg all other days   Next INR check:  2/3/2025               Telephone call with Renetta who verbalizes understanding and agrees to plan    Lab visit scheduled    Education provided: Please call back if any changes to your diet, medications or how you've been taking warfarin    Plan made per [facility] anticoagulation protocol    Da Lara RN  1/13/2025  Anticoagulation Clinic  [facility] for routing messages: cadence GAN  [facility] patient phone line: [phone]        _______________________________________________________________________     Anticoagulation Episode Summary       Current INR goal:  2.0-3.0   TTR:  74.3% (1 y)   Target end date:  Indefinite   Send INR reminders to:  PATRICIO GAN    Indications    Long term current use of anticoagulants with INR goal of 2.0-3.0 [Z79.01]  Anti-cardiolipin antibody syndrome [D68.61]             Comments:  --             Anticoagulation Care Providers       Provider Role Specialty Phone number    Marleni Shah MD Referring Family Medicine [phone]

## 2025-01-21 ENCOUNTER — TELEPHONE (OUTPATIENT)
Dept: FAMILY MEDICINE | Facility: CLINIC | Age: 55
End: 2025-01-21
Payer: COMMERCIAL

## 2025-01-21 NOTE — TELEPHONE ENCOUNTER
Prior Authorization Retail Medication Request    Medication/Dose: tirzepatide-Weight Management (ZEPBOUND) 2.5 MG/0.5ML prefilled pen  Diagnosis and ICD code (if different than what is on RX):  K21.9, E66.01  New/renewal/insurance change PA/secondary ins. PA:  Previously Tried and Failed:    Rationale:      Insurance   Primary: [employer]  Insurance ID:  [id number]    Secondary (if applicable):  Insurance ID:      Pharmacy Information (if different than what is on RX)  Name:    Phone:    Fax:    Clinic Information  Preferred routing pool for dept communication:       Alvino PINO

## 2025-01-22 ENCOUNTER — ANTICOAGULATION THERAPY VISIT (OUTPATIENT)
Dept: ANTICOAGULATION | Facility: CLINIC | Age: 55
End: 2025-01-22

## 2025-01-22 ENCOUNTER — LAB (OUTPATIENT)
Dept: LAB | Facility: CLINIC | Age: 55
End: 2025-01-22
Payer: COMMERCIAL

## 2025-01-22 DIAGNOSIS — D68.61 ANTI-CARDIOLIPIN ANTIBODY SYNDROME: ICD-10-CM

## 2025-01-22 DIAGNOSIS — Z79.01 LONG TERM CURRENT USE OF ANTICOAGULANTS WITH INR GOAL OF 2.0-3.0: Primary | ICD-10-CM

## 2025-01-22 LAB — INR BLD: 2.9 (ref 0.9–1.1)

## 2025-01-22 PROCEDURE — 36415 COLL VENOUS BLD VENIPUNCTURE: CPT

## 2025-01-22 PROCEDURE — 85610 PROTHROMBIN TIME: CPT

## 2025-01-22 NOTE — PROGRESS NOTES
ANTICOAGULATION MANAGEMENT     Renetta Reid 54 year old female is on warfarin with therapeutic INR result. (Goal INR 2.0-3.0)    Recent labs: (last 7 days)     01/22/25  1514   INR 2.9*       ASSESSMENT     Source(s): Chart Review and Patient/Caregiver Call     Warfarin doses taken: Warfarin taken as instructed  Diet: Difficulty chewing, but no major change in diet  Medication/supplement changes:  Tylenol as needed use No interaction anticipated  New illness, injury, or hospitalization: Dental pain/discomfort over the last few days  Signs or symptoms of bleeding or clotting: No  Previous result: Therapeutic last visit; previously outside of goal range  Additional findings: Upcoming surgery/procedure 2 teeth extractions planned for tomorrow 1/23/25 - Dentist requested 1 day hold (tonight) if approved by [facility] team - JESSICA consulted with SOLOMON Sim who approved hold with a boost on Friday to make up some missed medication.        PLAN     Recommended plan for temporary change(s) affecting INR     Dosing Instructions: hold dose tonight per Dentist request + take booster dose on 1/24/25 then continue your current warfarin dose with next INR in 1 week       Summary  As of 1/22/2025      Full warfarin instructions:  1/22: Hold; 1/24: 2.5 mg; Otherwise 1.25 mg every Tue, Fri; 2.5 mg all other days   Next INR check:  1/29/2025               Telephone call with Renetta who verbalizes understanding and agrees to plan    Lab visit scheduled    Education provided: Please call back if any changes to your diet, medications or how you've been taking warfarin  Symptom monitoring: monitoring for bleeding signs and symptoms and monitoring for clotting signs and symptoms    Plan made with [facility] Pharmacist Chiquis Patrick RN  1/22/2025  Anticoagulation Clinic  [employer] for routing messages: cadence GAN  [facility] patient phone line:  [phone]        _______________________________________________________________________     Anticoagulation Episode Summary       Current INR goal:  2.0-3.0   TTR:  75.5% (1 y)   Target end date:  Indefinite   Send INR reminders to:  PATRICIO GAN    Indications    Long term current use of anticoagulants with INR goal of 2.0-3.0 [Z79.01]  Anti-cardiolipin antibody syndrome [D68.61]             Comments:  --             Anticoagulation Care Providers       Provider Role Specialty Phone number    Marleni Shah MD [facility] Family Medicine [phone]

## 2025-01-23 NOTE — TELEPHONE ENCOUNTER
Retail Pharmacy Prior Authorization Team   Phone: [phone]    Currently an approval on file with patient's [facility] insurance -

## 2025-01-29 ENCOUNTER — LAB (OUTPATIENT)
Dept: LAB | Facility: CLINIC | Age: 55
End: 2025-01-29
Payer: COMMERCIAL

## 2025-01-29 ENCOUNTER — ANTICOAGULATION THERAPY VISIT (OUTPATIENT)
Dept: ANTICOAGULATION | Facility: CLINIC | Age: 55
End: 2025-01-29

## 2025-01-29 DIAGNOSIS — D68.61 ANTI-CARDIOLIPIN ANTIBODY SYNDROME: ICD-10-CM

## 2025-01-29 DIAGNOSIS — Z79.01 LONG TERM CURRENT USE OF ANTICOAGULANTS WITH INR GOAL OF 2.0-3.0: Primary | ICD-10-CM

## 2025-01-29 LAB — INR BLD: 2.7 (ref 0.9–1.1)

## 2025-01-29 PROCEDURE — 85610 PROTHROMBIN TIME: CPT

## 2025-01-29 PROCEDURE — 36416 COLLJ CAPILLARY BLOOD SPEC: CPT

## 2025-01-29 NOTE — PROGRESS NOTES
ANTICOAGULATION MANAGEMENT     Renetta Reid 54 year old female is on warfarin with therapeutic INR result. (Goal INR 2.0-3.0)    Recent labs: (last 7 days)     01/29/25  1545   INR 2.7*       ASSESSMENT     Source(s): Chart Review and Patient/Caregiver Call     Warfarin doses taken: Warfarin taken as instructed  Diet: No new diet changes identified  Medication/supplement changes:  2 more days left on amoxicillin  New illness, injury, or hospitalization: No  Signs or symptoms of bleeding or clotting: No  Previous result: Therapeutic last 2(+) visits  Additional findings: None       PLAN     Recommended plan for no diet, medication or health factor changes affecting INR     Dosing Instructions: Continue your current warfarin dose with next INR in 3 weeks       Summary  As of 1/29/2025      Full warfarin instructions:  1.25 mg every Tue, Fri; 2.5 mg all other days   Next INR check:  2/19/2025               Telephone call with Renetta who verbalizes understanding and agrees to plan    Lab visit scheduled    Education provided: Please call back if any changes to your diet, medications or how you've been taking warfarin    Plan made per [facility] anticoagulation protocol    Da Lara RN  1/29/2025  Anticoagulation Clinic  [employer] for routing messages: cadence GAN  [facility] patient phone line: [phone]        _______________________________________________________________________     Anticoagulation Episode Summary       Current INR goal:  2.0-3.0   TTR:  75.5% (1 y)   Target end date:  Indefinite   Send INR reminders to:  PATRICIO GAN    Indications    Long term current use of anticoagulants with INR goal of 2.0-3.0 [Z79.01]  Anti-cardiolipin antibody syndrome [D68.61]             Comments:  --             Anticoagulation Care Providers       Provider Role Specialty Phone number    Marleni Shah MD Referring Family Medicine [phone]

## 2025-02-04 ENCOUNTER — MYC REFILL (OUTPATIENT)
Dept: FAMILY MEDICINE | Facility: CLINIC | Age: 55
End: 2025-02-04
Payer: COMMERCIAL

## 2025-02-04 DIAGNOSIS — E66.01 MORBID OBESITY (H): ICD-10-CM

## 2025-02-04 DIAGNOSIS — K21.9 GASTROESOPHAGEAL REFLUX DISEASE WITHOUT ESOPHAGITIS: ICD-10-CM

## 2025-02-04 DIAGNOSIS — I10 BENIGN ESSENTIAL HYPERTENSION: ICD-10-CM

## 2025-02-26 ENCOUNTER — LAB (OUTPATIENT)
Dept: LAB | Facility: CLINIC | Age: 55
End: 2025-02-26
Payer: COMMERCIAL

## 2025-02-26 ENCOUNTER — ANTICOAGULATION THERAPY VISIT (OUTPATIENT)
Dept: ANTICOAGULATION | Facility: CLINIC | Age: 55
End: 2025-02-26

## 2025-02-26 DIAGNOSIS — Z79.01 LONG TERM CURRENT USE OF ANTICOAGULANTS WITH INR GOAL OF 2.0-3.0: Primary | ICD-10-CM

## 2025-02-26 DIAGNOSIS — D68.61 ANTI-CARDIOLIPIN ANTIBODY SYNDROME: ICD-10-CM

## 2025-02-26 LAB — INR BLD: 2.6 (ref 0.9–1.1)

## 2025-02-26 PROCEDURE — 85610 PROTHROMBIN TIME: CPT

## 2025-02-26 PROCEDURE — 36416 COLLJ CAPILLARY BLOOD SPEC: CPT

## 2025-02-27 NOTE — PROGRESS NOTES
ANTICOAGULATION MANAGEMENT     Renetta Reid 54 year old female is on warfarin with therapeutic INR result. (Goal INR 2.0-3.0)    Recent labs: (last 7 days)     02/26/25  1543   INR 2.6*       ASSESSMENT     Source(s): Chart Review and Patient/Caregiver Call     Warfarin doses taken: Warfarin taken as instructed  Diet: No new diet changes identified  Medication/supplement changes: None noted  New illness, injury, or hospitalization: No  Signs or symptoms of bleeding or clotting: No  Previous result: Therapeutic last 2(+) visits  Additional findings: None       PLAN     Recommended plan for no diet, medication or health factor changes affecting INR     Dosing Instructions: Continue your current warfarin dose with next INR in 6 weeks       Summary  As of 2/26/2025      Full warfarin instructions:  1.25 mg every Tue, Fri; 2.5 mg all other days   Next INR check:  4/9/2025               Telephone call with Renetta who verbalizes understanding and agrees to plan    Lab visit scheduled    Education provided: Please call back if any changes to your diet, medications or how you've been taking warfarin  Contact [phone] with any changes, questions or concerns.     Plan made per [facility] anticoagulation protocol    Gypsy Walker RN  2/26/2025  Anticoagulation Clinic  [employer] for routing messages: cadence GAN  [facility] patient phone line: [phone]        _______________________________________________________________________     Anticoagulation Episode Summary       Current INR goal:  2.0-3.0   TTR:  76.9% (1 y)   Target end date:  Indefinite   Send INR reminders to:  PATRICIO AGN    Indications    Long term current use of anticoagulants with INR goal of 2.0-3.0 [Z79.01]  Anti-cardiolipin antibody syndrome [D68.61]             Comments:  --             Anticoagulation Care Providers       Provider Role Specialty Phone number    Marleni Shah MD Referring Family Medicine [phone]

## 2025-03-19 ENCOUNTER — TELEPHONE (OUTPATIENT)
Dept: FAMILY MEDICINE | Facility: CLINIC | Age: 55
End: 2025-03-19
Payer: COMMERCIAL

## 2025-03-19 NOTE — TELEPHONE ENCOUNTER
Patient Quality Outreach    Patient is due for the following:   Breast Cancer Screening - Mammogram    Action(s) Taken: advise mammogram is due      Type of outreach:    Sent [employer] message.    Questions for provider review:    None         Lopez Dumont CMA  Chart routed to none.

## 2025-04-09 ENCOUNTER — ANTICOAGULATION THERAPY VISIT (OUTPATIENT)
Dept: ANTICOAGULATION | Facility: CLINIC | Age: 55
End: 2025-04-09

## 2025-04-09 ENCOUNTER — LAB (OUTPATIENT)
Dept: LAB | Facility: CLINIC | Age: 55
End: 2025-04-09
Payer: COMMERCIAL

## 2025-04-09 DIAGNOSIS — Z79.01 LONG TERM CURRENT USE OF ANTICOAGULANTS WITH INR GOAL OF 2.0-3.0: Primary | ICD-10-CM

## 2025-04-09 DIAGNOSIS — D68.61 ANTI-CARDIOLIPIN ANTIBODY SYNDROME: ICD-10-CM

## 2025-04-09 LAB — INR BLD: 2.6 (ref 0.9–1.1)

## 2025-04-09 PROCEDURE — 85610 PROTHROMBIN TIME: CPT

## 2025-04-09 PROCEDURE — 36416 COLLJ CAPILLARY BLOOD SPEC: CPT

## 2025-04-09 NOTE — PROGRESS NOTES
ANTICOAGULATION MANAGEMENT     Renetta Reid 54 year old female is on warfarin with therapeutic INR result. (Goal INR 2.0-3.0)    Recent labs: (last 7 days)     04/09/25  1521   INR 2.6*       ASSESSMENT     Source(s): Chart Review  Previous INR was Therapeutic last 2(+) visits  Medication, diet, health changes since last INR chart reviewed; none identified         PLAN     Recommended plan for no diet, medication or health factor changes affecting INR     Dosing Instructions: Continue your current warfarin dose with next INR in 6 weeks       Summary  As of 4/9/2025      Full warfarin instructions:  1.25 mg every Tue, Fri; 2.5 mg all other days   Next INR check:  5/21/2025               Detailed voice message left for Renetta with dosing instructions and follow up date.     Contact [phone] to schedule and with any changes, questions or concerns.     Education provided: Please call back if any changes to your diet, medications or how you've been taking warfarin  Contact [phone] with any changes, questions or concerns.     Plan made per [facility] anticoagulation protocol    Gypsy Walker RN  4/9/2025  Anticoagulation Clinic  [employer] [facility] [place] for routing messages: cadence GAN  [facility] patient phone line: [phone]        _______________________________________________________________________     Anticoagulation Episode Summary       Current INR goal:  2.0-3.0   TTR:  78.2% (1 y)   Target end date:  Indefinite   Send INR reminders to:  PATRICIO GAN    Indications    Long term current use of anticoagulants with INR goal of 2.0-3.0 [Z79.01]  Anti-cardiolipin antibody syndrome [D68.61]             Comments:  --             Anticoagulation Care Providers       Provider Role Specialty Phone number    Marleni Shah MD Referring Family Medicine [phone]

## 2025-04-21 ENCOUNTER — MYC REFILL (OUTPATIENT)
Dept: FAMILY MEDICINE | Facility: CLINIC | Age: 55
End: 2025-04-21
Payer: COMMERCIAL

## 2025-04-21 DIAGNOSIS — E66.01 MORBID OBESITY (H): ICD-10-CM

## 2025-04-22 ENCOUNTER — MYC MEDICAL ADVICE (OUTPATIENT)
Dept: FAMILY MEDICINE | Facility: CLINIC | Age: 55
End: 2025-04-22
Payer: COMMERCIAL

## 2025-04-29 DIAGNOSIS — D68.9 COAGULOPATHY: ICD-10-CM

## 2025-04-29 RX ORDER — WARFARIN SODIUM 2.5 MG/1
TABLET ORAL
Qty: 90 TABLET | Refills: 1 | Status: SHIPPED | OUTPATIENT
Start: 2025-04-29

## 2025-04-29 NOTE — TELEPHONE ENCOUNTER
ANTICOAGULATION MANAGEMENT:  Medication Refill    Anticoagulation Summary  As of 4/9/2025      Warfarin maintenance plan:  1.25 mg (2.5 mg x 0.5) every Tue, Fri; 2.5 mg (2.5 mg x 1) all other days   Next INR check:  5/21/2025   Target end date:  Indefinite    Indications    Long term current use of anticoagulants with INR goal of 2.0-3.0 [Z79.01]  Anti-cardiolipin antibody syndrome [D68.61]                 Anticoagulation Care Providers       Provider Role Specialty Phone number    Marleni Shah MD Referring Family Medicine [phone]            Refill Criteria    Visit with referring provider/group: Meets criteria: visit within referring provider group in the last 15 months on 10/15/24    [facility] referral last signed: 08/06/2024; within last year:  Yes    Lab monitoring is up to date (not exceeding 2 weeks overdue): Yes    Renetta meets all criteria for refill. Rx instructions and quantity supplied updated to match patient's current dosing plan. Warfarin 90 day supply with 1 refill granted per [facility] protocol     Da Lara RN  Anticoagulation Clinic

## 2025-05-21 ENCOUNTER — MYC REFILL (OUTPATIENT)
Dept: FAMILY MEDICINE | Facility: CLINIC | Age: 55
End: 2025-05-21
Payer: COMMERCIAL

## 2025-05-21 DIAGNOSIS — E66.01 MORBID OBESITY (H): ICD-10-CM

## 2025-06-03 ENCOUNTER — RESULTS FOLLOW-UP (OUTPATIENT)
Dept: ANTICOAGULATION | Facility: CLINIC | Age: 55
End: 2025-06-03

## 2025-06-03 ENCOUNTER — LAB (OUTPATIENT)
Dept: LAB | Facility: CLINIC | Age: 55
End: 2025-06-03
Payer: COMMERCIAL

## 2025-06-03 ENCOUNTER — ANTICOAGULATION THERAPY VISIT (OUTPATIENT)
Dept: ANTICOAGULATION | Facility: CLINIC | Age: 55
End: 2025-06-03

## 2025-06-03 DIAGNOSIS — D68.61 ANTI-CARDIOLIPIN ANTIBODY SYNDROME: ICD-10-CM

## 2025-06-03 DIAGNOSIS — Z79.01 LONG TERM CURRENT USE OF ANTICOAGULANTS WITH INR GOAL OF 2.0-3.0: Primary | ICD-10-CM

## 2025-06-03 LAB — INR BLD: 2.2 (ref 0.9–1.1)

## 2025-06-03 PROCEDURE — 36415 COLL VENOUS BLD VENIPUNCTURE: CPT

## 2025-06-03 PROCEDURE — 85610 PROTHROMBIN TIME: CPT

## 2025-06-03 NOTE — PROGRESS NOTES
ANTICOAGULATION MANAGEMENT     Renetta Reid 54 year old female is on warfarin with therapeutic INR result. (Goal INR 2.0-3.0)    Recent labs: (last 7 days)     06/03/25  1537   INR 2.2*       ASSESSMENT     Source(s): Chart Review  Previous INR was Therapeutic last 2(+) visits  Medication, diet, health changes since last INR chart reviewed; none identified         PLAN     Recommended plan for no diet, medication or health factor changes affecting INR     Dosing Instructions: Continue your current warfarin dose with next INR in 6 weeks       Summary  As of 6/3/2025      Full warfarin instructions:  1.25 mg every Tue, Fri; 2.5 mg all other days   Next INR check:  7/15/2025               Detailed voice message left for Renetta with dosing instructions and follow up date.     Contact [phone] to schedule and with any changes, questions or concerns.     Education provided: Please call back if any changes to your diet, medications or how you've been taking warfarin  Contact [phone] with any changes, questions or concerns.     Plan made per [facility] anticoagulation protocol    Elaine Lu RN  6/3/2025  Anticoagulation Clinic  [facility] [place] for routing messages: cadence GAN  [facility] patient phone line: [phone]        _______________________________________________________________________     Anticoagulation Episode Summary       Current INR goal:  2.0-3.0   TTR:  85.4% (1 y)   Target end date:  Indefinite   Send INR reminders to:  PATRICIO GAN    Indications    Long term current use of anticoagulants with INR goal of 2.0-3.0 [Z79.01]  Anti-cardiolipin antibody syndrome [D68.61]             Comments:  --             Anticoagulation Care Providers       Provider Role Specialty Phone number    Marleni Shah MD Referring Family Medicine [phone]

## 2025-06-16 ENCOUNTER — TELEPHONE (OUTPATIENT)
Dept: FAMILY MEDICINE | Facility: CLINIC | Age: 55
End: 2025-06-16
Payer: COMMERCIAL

## 2025-06-16 NOTE — TELEPHONE ENCOUNTER
Patient Quality Outreach    Patient is due for the following:   Breast Cancer Screening - Mammogram    Action(s) Taken:   Advise mammogram is due.     Type of outreach:    Sent [employer] message.    Questions for provider review:    None         Lopez Dumont CMA  Chart routed to None.

## 2025-06-17 ENCOUNTER — HOSPITAL ENCOUNTER (OUTPATIENT)
Dept: MAMMOGRAPHY | Facility: CLINIC | Age: 55
Discharge: HOME | End: 2025-06-17
Attending: FAMILY MEDICINE
Payer: COMMERCIAL

## 2025-06-17 DIAGNOSIS — Z12.31 VISIT FOR SCREENING MAMMOGRAM: ICD-10-CM

## 2025-06-17 PROCEDURE — 77063 BREAST TOMOSYNTHESIS BI: CPT

## 2025-06-29 ENCOUNTER — MYC REFILL (OUTPATIENT)
Dept: FAMILY MEDICINE | Facility: CLINIC | Age: 55
End: 2025-06-29
Payer: COMMERCIAL

## 2025-06-29 DIAGNOSIS — E66.01 MORBID OBESITY (H): ICD-10-CM

## 2025-07-21 ENCOUNTER — MYC MEDICAL ADVICE (OUTPATIENT)
Dept: FAMILY MEDICINE | Facility: CLINIC | Age: 55
End: 2025-07-21
Payer: COMMERCIAL

## 2025-07-21 DIAGNOSIS — E66.01 MORBID OBESITY (H): Primary | ICD-10-CM

## 2025-07-22 ENCOUNTER — MYC MEDICAL ADVICE (OUTPATIENT)
Dept: ANTICOAGULATION | Facility: CLINIC | Age: 55
End: 2025-07-22
Payer: COMMERCIAL

## 2025-07-23 NOTE — TELEPHONE ENCOUNTER
Please see MyChart.    Pt requesting increase in dose of Zepbound. Denies any side effects.    Rx pended. Please update med list if appropriate.    Alyson GUERRAN, RN, PHN

## 2025-07-29 ENCOUNTER — RESULTS FOLLOW-UP (OUTPATIENT)
Dept: ANTICOAGULATION | Facility: CLINIC | Age: 55
End: 2025-07-29

## 2025-07-29 ENCOUNTER — ANTICOAGULATION THERAPY VISIT (OUTPATIENT)
Dept: ANTICOAGULATION | Facility: CLINIC | Age: 55
End: 2025-07-29

## 2025-07-29 ENCOUNTER — LAB (OUTPATIENT)
Dept: LAB | Facility: CLINIC | Age: 55
End: 2025-07-29
Payer: COMMERCIAL

## 2025-07-29 DIAGNOSIS — D68.61 ANTI-CARDIOLIPIN ANTIBODY SYNDROME: ICD-10-CM

## 2025-07-29 DIAGNOSIS — Z79.01 LONG TERM CURRENT USE OF ANTICOAGULANTS WITH INR GOAL OF 2.0-3.0: Primary | ICD-10-CM

## 2025-07-29 LAB — INR BLD: 1.7 (ref 0.9–1.1)

## 2025-07-29 PROCEDURE — 85610 PROTHROMBIN TIME: CPT

## 2025-07-29 PROCEDURE — 36415 COLL VENOUS BLD VENIPUNCTURE: CPT

## 2025-07-29 NOTE — PROGRESS NOTES
ANTICOAGULATION MANAGEMENT     Renetta Reid 54 year old female is on warfarin with subtherapeutic INR result. (Goal INR 2.0-3.0)    Recent labs: (last 7 days)     07/29/25  1531   INR 1.7*       ASSESSMENT     Source(s): Chart Review and Patient/Caregiver Call     Warfarin doses taken: possibly could have forgotten one dose of meds in the last 5 days, she's not sleeping in usual bed since  had knee replacement surgery.   Diet: No new diet changes identified  Medication/supplement changes: None noted  New illness, injury, or hospitalization: No  Signs or symptoms of bleeding or clotting: No  Previous result: Therapeutic last 2(+) visits  Additional findings: None       PLAN     Recommended plan for temporary change(s) affecting INR     Dosing Instructions: booster dose then continue your current warfarin dose with next INR in 2 weeks       Summary  As of 7/29/2025      Full warfarin instructions:  7/29: 2.5 mg; Otherwise 1.25 mg every Tue, Fri; 2.5 mg all other days   Next INR check:  8/12/2025               Telephone call with Renetta who agrees to plan and repeated back plan correctly    Lab visit scheduled    Education provided: Please call back if any changes to your diet, medications or how you've been taking warfarin  Contact [phone] with any changes, questions or concerns.     Plan made per [facility] anticoagulation protocol    Gypsy Walker RN  7/29/2025  Anticoagulation Clinic  [facility] for routing messages: cadence GAN  [facility] patient phone line: [phone]        _______________________________________________________________________     Anticoagulation Episode Summary       Current INR goal:  2.0-3.0   TTR:  78.1% (1 y)   Target end date:  Indefinite   Send INR reminders to:  PATRICIO GAN    Indications    Long term current use of anticoagulants with INR goal of 2.0-3.0 [Z79.01]  Anti-cardiolipin antibody syndrome [D68.61]             Comments:  --             Anticoagulation Care  Providers       Provider Role Specialty Phone number    Marleni Shah MD Referring Family Medicine [phone]

## 2025-07-30 ENCOUNTER — DOCUMENTATION ONLY (OUTPATIENT)
Dept: ANTICOAGULATION | Facility: CLINIC | Age: 55
End: 2025-07-30
Payer: COMMERCIAL

## 2025-07-30 DIAGNOSIS — D68.61 ANTI-CARDIOLIPIN ANTIBODY SYNDROME: Primary | ICD-10-CM

## 2025-07-30 NOTE — PROGRESS NOTES
ANTICOAGULATION CLINIC REFERRAL RENEWAL REQUEST       An annual renewal order is required for all patients referred to [facility].?  Please review and sign the pended referral order for Renetta Reid.       ANTICOAGULATION SUMMARY      Warfarin indication(s)   Anti-cardiolipin antibody syndrome    Mechanical heart valve present?  NO       Current goal range   INR: 2.0-3.0     Goal appropriate for indication? Goal INR 2-3, standard for indication(s) above     Time in Therapeutic Range (TTR)  (Goal > 60%) 78.1%       Office visit with referring provider's group within last year yes on 10/15/24   Additional standing orders None       Gypsy Walker, RN  [facility]

## 2025-08-12 ENCOUNTER — RESULTS FOLLOW-UP (OUTPATIENT)
Dept: ANTICOAGULATION | Facility: CLINIC | Age: 55
End: 2025-08-12

## 2025-08-12 ENCOUNTER — ANTICOAGULATION THERAPY VISIT (OUTPATIENT)
Dept: ANTICOAGULATION | Facility: CLINIC | Age: 55
End: 2025-08-12

## 2025-08-12 ENCOUNTER — LAB (OUTPATIENT)
Dept: LAB | Facility: CLINIC | Age: 55
End: 2025-08-12
Payer: COMMERCIAL

## 2025-08-12 DIAGNOSIS — D68.61 ANTI-CARDIOLIPIN ANTIBODY SYNDROME: ICD-10-CM

## 2025-08-12 DIAGNOSIS — Z79.01 LONG TERM CURRENT USE OF ANTICOAGULANTS WITH INR GOAL OF 2.0-3.0: Primary | ICD-10-CM

## 2025-08-12 LAB — INR BLD: 2.1 (ref 0.9–1.1)

## 2025-08-12 PROCEDURE — 85610 PROTHROMBIN TIME: CPT

## 2025-08-12 PROCEDURE — 36415 COLL VENOUS BLD VENIPUNCTURE: CPT
